# Patient Record
Sex: MALE | Race: AMERICAN INDIAN OR ALASKA NATIVE | NOT HISPANIC OR LATINO
[De-identification: names, ages, dates, MRNs, and addresses within clinical notes are randomized per-mention and may not be internally consistent; named-entity substitution may affect disease eponyms.]

---

## 2019-08-12 ENCOUNTER — APPOINTMENT (OUTPATIENT)
Dept: NEUROSURGERY | Facility: CLINIC | Age: 79
End: 2019-08-12
Payer: MEDICAID

## 2019-08-12 VITALS
HEIGHT: 66 IN | HEART RATE: 79 BPM | RESPIRATION RATE: 16 BRPM | SYSTOLIC BLOOD PRESSURE: 116 MMHG | WEIGHT: 115 LBS | BODY MASS INDEX: 18.48 KG/M2 | OXYGEN SATURATION: 98 % | DIASTOLIC BLOOD PRESSURE: 78 MMHG

## 2019-08-12 DIAGNOSIS — Z86.39 PERSONAL HISTORY OF OTHER ENDOCRINE, NUTRITIONAL AND METABOLIC DISEASE: ICD-10-CM

## 2019-08-12 DIAGNOSIS — Z87.891 PERSONAL HISTORY OF NICOTINE DEPENDENCE: ICD-10-CM

## 2019-08-12 DIAGNOSIS — Z85.118 PERSONAL HISTORY OF OTHER MALIGNANT NEOPLASM OF BRONCHUS AND LUNG: ICD-10-CM

## 2019-08-12 DIAGNOSIS — Z87.09 PERSONAL HISTORY OF OTHER DISEASES OF THE RESPIRATORY SYSTEM: ICD-10-CM

## 2019-08-12 DIAGNOSIS — Z87.438 PERSONAL HISTORY OF OTHER DISEASES OF MALE GENITAL ORGANS: ICD-10-CM

## 2019-08-12 DIAGNOSIS — Z56.0 UNEMPLOYMENT, UNSPECIFIED: ICD-10-CM

## 2019-08-12 DIAGNOSIS — Z87.898 PERSONAL HISTORY OF OTHER SPECIFIED CONDITIONS: ICD-10-CM

## 2019-08-12 DIAGNOSIS — Z85.9 PERSONAL HISTORY OF MALIGNANT NEOPLASM, UNSPECIFIED: ICD-10-CM

## 2019-08-12 PROBLEM — Z00.00 ENCOUNTER FOR PREVENTIVE HEALTH EXAMINATION: Status: ACTIVE | Noted: 2019-08-12

## 2019-08-12 PROCEDURE — 99205 OFFICE O/P NEW HI 60 MIN: CPT

## 2019-08-12 RX ORDER — FLUTICASONE FUROATE AND VILANTEROL TRIFENATATE 100; 25 UG/1; UG/1
100-25 POWDER RESPIRATORY (INHALATION)
Refills: 0 | Status: ACTIVE | COMMUNITY

## 2019-08-12 RX ORDER — THEOPHYLLINE ANHYDROUS 200 MG/1
200 CAPSULE, EXTENDED RELEASE ORAL
Refills: 0 | Status: ACTIVE | COMMUNITY

## 2019-08-12 RX ORDER — FINASTERIDE 5 MG/1
5 TABLET, FILM COATED ORAL
Refills: 0 | Status: ACTIVE | COMMUNITY

## 2019-08-12 RX ORDER — FAMOTIDINE 20 MG/1
20 TABLET, FILM COATED ORAL
Refills: 0 | Status: ACTIVE | COMMUNITY

## 2019-08-12 RX ORDER — UMECLIDINIUM 62.5 UG/1
62.5 AEROSOL, POWDER ORAL
Refills: 0 | Status: ACTIVE | COMMUNITY

## 2019-08-12 RX ORDER — FOLIC ACID 1 MG/1
1 TABLET ORAL
Refills: 0 | Status: ACTIVE | COMMUNITY

## 2019-08-12 RX ORDER — TAMSULOSIN HYDROCHLORIDE 0.4 MG/1
0.4 CAPSULE ORAL
Refills: 0 | Status: ACTIVE | COMMUNITY

## 2019-08-12 SDOH — ECONOMIC STABILITY - INCOME SECURITY: UNEMPLOYMENT, UNSPECIFIED: Z56.0

## 2019-08-12 NOTE — PHYSICAL EXAM
[General Appearance - Alert] : alert [General Appearance - In No Acute Distress] : in no acute distress [Cranial Nerves Oculomotor (III)] : extraocular motion intact [Oriented To Time, Place, And Person] : oriented to person, place, and time [Cranial Nerves Optic (II)] : visual acuity intact bilaterally,  pupils equal round and reactive to light [Cranial Nerves Trigeminal (V)] : facial sensation intact symmetrically [Cranial Nerves Vestibulocochlear (VIII)] : hearing was intact bilaterally [Cranial Nerves Facial (VII)] : face symmetrical [Cranial Nerves Accessory (XI - Cranial And Spinal)] : head turning and shoulder shrug symmetric [Cranial Nerves Glossopharyngeal (IX)] : tongue and palate midline [Cranial Nerves Hypoglossal (XII)] : there was no tongue deviation with protrusion [Motor Tone] : muscle tone was normal in all four extremities [Motor Strength] : muscle strength was normal in all four extremities [Sclera] : the sclera and conjunctiva were normal [Outer Ear] : the ears and nose were normal in appearance [Neck Appearance] : the appearance of the neck was normal [] : no respiratory distress [Heart Rate And Rhythm] : heart rate was normal and rhythm regular [Respiration, Rhythm And Depth] : normal respiratory rhythm and effort [Skin Color & Pigmentation] : normal skin color and pigmentation [Abdomen Soft] : soft [Abnormal Walk] : normal gait

## 2019-08-15 NOTE — REASON FOR VISIT
[Consultation] : a consultation visit [Referred By: _________] : Patient was referred by ALEXANDRA [Spouse] : spouse [[employer] ] : provided by [employer]   [FreeTextEntry1] : [id number] [FreeTextEntry2] : Neri

## 2019-08-15 NOTE — PLAN
[FreeTextEntry1] : MRI brain with and without contrast from 8/1/19 reviewed by Dr. Olivier, demonstrates LEFT frontal lobe mass, RIGHT frontal gyrus metastasis,as well as other smaller brain metastases. \par The patient has a good performance status, KPS = 80.\par Gamma Knife Radiosurgery recommended. Risks, alternatives and benefits to Gamma Knife Radiosurgery discussed. Risks include but are not limited to cerebral edema, radionecrosis, seizures, continued tumor growth. \par \par Explained procedure to patient in detail including head frame placement, new MRI the day of procedure, radiation treatment, and post-procedure care. \par Radiation treatment will take place at [address] [place] [address]. Directions and contact information provided to patient.\par Education packet regarding Gamma Knife Radiosurgery provided.\par Multiple questions answered to patient's satisfaction.\par Education provided regarding plan of care.\par \par Patient and patient's wife understand and wish to proceed.\par \par \par \par

## 2019-08-15 NOTE — CONSULT LETTER
[Dear  ___] : Dear  [unfilled], [Consult Letter:] : I had the pleasure of evaluating your patient, [unfilled]. [Please see my note below.] : Please see my note below. [Sincerely,] : Sincerely, [Consult Closing:] : Thank you very much for allowing me to participate in the care of this patient.  If you have any questions, please do not hesitate to contact me. [DrMaine  ___] : Dr. MORRIS [FreeTextEntry2] : Dr. Estella Paz\[address]\[place] [address] [FreeTextEntry3] : Curtis Olivier MD

## 2019-08-15 NOTE — DATA REVIEWED
[de-identified] : MRI brain 8/1/19:\par \par Impression: Left frontal craniotomy and resection of left inferior frontal lobe mass with very small foci of residual enhancement deep within the resection cavity.\par \par Residual hemorrhage in the surgical bed as well as an overlying thin subdural hematoma that extends into the interhemispheric fissue with hemorrhage and pneumocephalus subacent to the craniotomy.\par \par Unchanged rightward midline shift of 3 mm compared to CT on 7/31/19.\par \par Unchanged right frontal gyrus 4 mm metastasis with mild surrounding vasogenic edema.\par \par Additional small foci of enhancement in the right inferior temporal lobe, left inferior parietal lobe and right frontal lobe subcortical white matter most likely metastases with no surrounding vasogenic edema.

## 2019-08-15 NOTE — HISTORY OF PRESENT ILLNESS
[de-identified] : 78 year old man with past medical history of BPH, hypothyroidism, metastatic adenocarcinoma of the lung, diagnosed in August 2018, (mets to lymph nodes, liver and bone) currently on chemotherapy under the care of Dr. Wasserman (maintenance pembrolizumab and Alimta; last dose on  7/12/19) referred by Dr. Estella Paz for consideration of Gamma Knife Radiosurgery for brain metastases.\par \par Mr. Lantigua presented to [facility] ED with confusion, generalized weakness x 3 days. Workup demonstrated a LEFT frontal lobe mass. He is s/p resection by Dr. Keenan Livingston on 7/30/19. Pathology indicated metastatic adenocarcinoma with papillary features, consistent with metastasis from patient's known lung adenocarcinoma. \par \par He comes today for discussion of GKRS to resection cavity and brain metastases.\par \par The patient is Cantonese speaking only and translation is provided via [employer].

## 2019-08-15 NOTE — ADDENDUM
[FreeTextEntry1] : 	2019 \par  \par  \par OFFICE CONSULTATION NOTE \par  \par  \par Re:	Goran Lantigua  \par :	42 \par MRN:  [id number] \par  \par Mr. Lantigua is a 79-year-old man with a history of metastatic lung carcinoma, diagnosed in .  He is status post chemotherapy, immunotherapy and left frontal resection for a metastatic brain lesion on 2019.  Mr. Lantigua has additional, small, punctate lesions in the right frontal lobe measuring 1.4 mm, right temporal lobe measuring 2.6 mm and left parietal lobe measuring 2 mm. \par  \par I discussed with Mr. Lantigua, and his wife, that Gamma Knife stereotactic radiosurgery is indicated as adjuvant therapy to treat his resection cavity in the left frontal lobe with the goal of reducing the incidence of tumor recurrence/progression.  The resection cavity measures approximately 2.8 cm in greatest dimension.  I discussed also the utility of Gamma Knife radiosurgery to address the additional foci of punctate, metastatic enhancing lesions in other brain regions.  Risks of radiosurgery including symptomatic radionecrosis and/or cerebral edema were discussed.  We also discussed the possibility for tumor progression despite treatment, and the possibility of additional, untreated lesions appearing within the brain. He understands that indefinite surveillance with MRI will be necessary. \par  \par Mr. Lantigua, and his wife, would like to proceed with Gamma Knife radiosurgery which I certainly think is reasonable.  We will schedule for single fraction, headframe based Gamma Knife in the coming weeks. \par  \par  \par  \par  \par  \par Curtis Olivier M.D. \[facility]  of Neurosurgery \[facility] \[facility] Department of Neurosurgery \par [facility] \par [address] \[facility], Third [place] [address] \[facility] Tel: [phone] \[facility] Email:  [email or web address] \par  \par  \par [facility] #[id number] \[facility]  \[facility]  \[facility]

## 2019-08-29 ENCOUNTER — OUTPATIENT (OUTPATIENT)
Dept: OUTPATIENT SERVICES | Facility: HOSPITAL | Age: 79
LOS: 1 days | Discharge: HOME | End: 2019-08-29
Payer: MEDICAID

## 2019-09-03 ENCOUNTER — APPOINTMENT (OUTPATIENT)
Dept: MRI IMAGING | Facility: IMAGING CENTER | Age: 79
End: 2019-09-03

## 2019-09-03 ENCOUNTER — APPOINTMENT (OUTPATIENT)
Dept: RADIATION ONCOLOGY | Facility: CLINIC | Age: 79
End: 2019-09-03

## 2019-09-03 ENCOUNTER — APPOINTMENT (OUTPATIENT)
Dept: NEUROSURGERY | Facility: AMBULATORY SURGERY CENTER | Age: 79
End: 2019-09-03

## 2019-09-16 ENCOUNTER — OUTPATIENT (OUTPATIENT)
Dept: OUTPATIENT SERVICES | Facility: HOSPITAL | Age: 79
LOS: 1 days | End: 2019-09-16
Payer: COMMERCIAL

## 2019-09-16 ENCOUNTER — APPOINTMENT (OUTPATIENT)
Dept: MRI IMAGING | Facility: HOSPITAL | Age: 79
End: 2019-09-16
Payer: MEDICAID

## 2019-09-16 ENCOUNTER — APPOINTMENT (OUTPATIENT)
Dept: NEUROSURGERY | Facility: CLINIC | Age: 79
End: 2019-09-16
Payer: MEDICAID

## 2019-09-16 VITALS
WEIGHT: 115 LBS | BODY MASS INDEX: 18.48 KG/M2 | OXYGEN SATURATION: 95 % | HEART RATE: 120 BPM | RESPIRATION RATE: 16 BRPM | DIASTOLIC BLOOD PRESSURE: 76 MMHG | HEIGHT: 66 IN | SYSTOLIC BLOOD PRESSURE: 106 MMHG

## 2019-09-16 PROCEDURE — 99215 OFFICE O/P EST HI 40 MIN: CPT

## 2019-09-16 PROCEDURE — 70553 MRI BRAIN STEM W/O & W/DYE: CPT

## 2019-09-16 PROCEDURE — 70553 MRI BRAIN STEM W/O & W/DYE: CPT | Mod: 26

## 2019-09-16 PROCEDURE — A9585: CPT

## 2019-09-16 NOTE — REASON FOR VISIT
[Follow-Up: _____] : a [unfilled] follow-up visit [[employer] ] : provided by [employer]   [FreeTextEntry1] : Returns to review new MRI brain with and without contrast done earlier today and discuss treatment recommendations. \par The patient was previously scheduled for GKRS but did not come the day of treatment due to recent diagnosis of pneumonia.\par The patient primarily speaks Cantonese and translation is provided via [employer].\par \par At today's visit, he denies weakness, headaches, dizziness, seizures, nausea/vomiting.\par He comes to the visit with his wife.\par \par  [TWNoteComboBox1] : Catia

## 2019-09-16 NOTE — HISTORY OF PRESENT ILLNESS
[de-identified] : 78 year old man with past medical history of BPH, hypothyroidism, metastatic adenocarcinoma of the lung, diagnosed in August 2018, (mets to lymph nodes, liver and bone) currently on chemotherapy under the care of Dr. Wasserman (maintenance pembrolizumab and Alimta; last dose on  7/12/19) referred by Dr. Estella Paz for consideration of Gamma Knife Radiosurgery for brain metastases.\par \par Mr. Lantigua presented to [facility] ED with confusion, generalized weakness x 3 days. Workup demonstrated a LEFT frontal lobe mass. He is s/p resection by Dr. Keenan Livingston on 7/30/19. Pathology indicated metastatic adenocarcinoma with papillary features, consistent with metastasis from patient's known lung adenocarcinoma. \par \par He comes today for discussion of GKRS to resection cavity and brain metastases.\par \par The patient is Cantonese speaking only and translation is provided via [employer].

## 2019-09-16 NOTE — DATA REVIEWED
[de-identified] :  \par EXAM: MR BRAIN WAW IC  \par  \par PROCEDURE DATE: 09/16/2019  \par  \par  \par  \par INTERPRETATION: PROCEDURE: MRI brain with and without contrast  \par  \par INDICATION: Brain metastases  \par  \par TECHNIQUE: Sagittal T1, axial T1, T2, FLAIR, diffusion, GRE and coronal  \par FLAIR images of the brain were obtained. Following the intravenous  \par administration of contrast 7.5 ml of Gadavist, axial T1 and sagittal T1-  \par MPRAGE images of the brain were obtained and reconstructed in the axial and  \par coronal plane.  \par  \par COMPARISON: Outside MRI from a [facility] dated 8/1/2019  \par  \par FINDINGS: The MRI examination of the brain demonstrates the patient to be  \par status post left frontal craniotomy. There is extradural fluid collection at  \par the craniotomy site. In addition there is thin right hemispheric subdural  \par collection. There is a resection cavity within the inferior left frontal  \par lobe. There has been interval improvement in the hemorrhagic products within  \par an lining the resection cavity. The postcontrast images demonstrates  \par enhancement along the margins of the resection cavity. No nodularity is  \par identified to suggest recurrent or residual disease. There has been interval  \par improvement in the extensive FLAIR/T2 signal abnormality surrounding the  \par resection cavity within the left frontal lobe with mild residual signal  \par abnormality. There has been interval improvement in the mass effect on the  \par left frontal horn and anterior body of the left lateral ventricle. There has  \par been reexpansion of the ventricles.  \par  \par There is a approximately 3.6 mm nodular focus of enhancement within the  \par right frontal lobe within the right precentral gyrus (series 13 image 25 and  \par series 14 image 64. There is mild surrounding FLAIR/T2 signal abnormality.  \par The lesion measured approximately 2.6 mm on the prior study and remain  \par slightly increased in size.  \par  \par There is approximately 1.6 mm nodular focus of enhancement within the  \par anterior right frontal lobe (series 13 image 22 and series 14 image 75).  \par These findings are compatible with metastases.  \par  \par The FLAIR/T2-weighted images demonstrate scattered punctate foci of  \par increased signal within the periventricular and subcortical white matter  \par which is nonspecific and may represent these sequela of small vessel  \par ischemic disease. There is normal vascular flow-voids. The lenses are absent  \par bilaterally which may be secondary to prior cataract surgery. There is mild  \par mucosal thickening within the maxillary sinus left frontal sinus. The rest  \par of the visualized paranasal sinuses are free of mucosal disease. The mastoid  \par air cells are well-aerated.  \par  \par IMPRESSION: Status post left frontal craniotomy with resection cavity within  \par the inferior left frontal lobe. No abnormal nodular enhancement to suggest  \par recurrent or residual tumoral disease within the surgical bed. Two  \par additional small metastases within the frontal lobe.  \par  \par  \par  \par  \par  \par Thank you for the opportunity to participate in the care of this patient.  \par  \par  \par  \par JEAN PAUL ORELLANA M.D., ATTENDING RADIOLOGIST  \par This document has been electronically signed. Sep 16 2019 2:46PM  \par

## 2019-09-16 NOTE — ASSESSMENT
[FreeTextEntry1] : MRI brain done today reviewed by Dr. Olivier with patient and patient's wife in detail.\par Gamma Knife Radiosurgery recommended. Recommend Gamma Knife with three fractions.\par Risks, alternatives and benefits to Gamma Knife Radiosurgery discussed. Risks include but are not limited to cerebral edema, radionecrosis, seizures, continued tumor growth. Patient understands and wishes to proceed.\par \par Explained procedure to patient in detail including head frame placement, new MRI the day of procedure, radiation treatment, and post-procedure care. \par Radiation treatment will take place at [address] [place] [address]. Directions and contact information provided to patient.\par Education packet regarding Gamma Knife Radiosurgery provided.\par Multiple questions answered to patient's satisfaction.\par Education provided regarding plan of care.\par \par \par Patient and patient's wife verbalize agreement and understanding with plan of care. \par \par \par

## 2019-09-16 NOTE — PHYSICAL EXAM
[General Appearance - In No Acute Distress] : in no acute distress [General Appearance - Alert] : alert [Motor Tone] : muscle tone was normal in all four extremities [Oriented To Time, Place, And Person] : oriented to person, place, and time [Motor Strength] : muscle strength was normal in all four extremities [Sclera] : the sclera and conjunctiva were normal [Outer Ear] : the ears and nose were normal in appearance [Neck Appearance] : the appearance of the neck was normal [Respiration, Rhythm And Depth] : normal respiratory rhythm and effort [] : no respiratory distress [Heart Rate And Rhythm] : heart rate was normal and rhythm regular [Abnormal Walk] : normal gait [Skin Color & Pigmentation] : normal skin color and pigmentation

## 2019-09-18 ENCOUNTER — FORM ENCOUNTER (OUTPATIENT)
Age: 79
End: 2019-09-18

## 2019-09-19 ENCOUNTER — APPOINTMENT (OUTPATIENT)
Dept: RADIATION ONCOLOGY | Facility: CLINIC | Age: 79
End: 2019-09-19
Payer: MEDICAID

## 2019-09-19 ENCOUNTER — APPOINTMENT (OUTPATIENT)
Dept: MRI IMAGING | Facility: IMAGING CENTER | Age: 79
End: 2019-09-19

## 2019-09-19 ENCOUNTER — OUTPATIENT (OUTPATIENT)
Dept: OUTPATIENT SERVICES | Facility: HOSPITAL | Age: 79
LOS: 1 days | End: 2019-09-19
Payer: MEDICAID

## 2019-09-19 DIAGNOSIS — C79.31 SECONDARY MALIGNANT NEOPLASM OF BRAIN: ICD-10-CM

## 2019-09-19 PROCEDURE — 99203 OFFICE O/P NEW LOW 30 MIN: CPT | Mod: 25

## 2019-09-19 PROCEDURE — 76498 UNLISTED MR PROCEDURE: CPT

## 2019-09-19 PROCEDURE — 77334 RADIATION TREATMENT AID(S): CPT | Mod: 26

## 2019-09-19 PROCEDURE — 77290 THER RAD SIMULAJ FIELD CPLX: CPT | Mod: 26

## 2019-09-20 NOTE — LETTER CLOSING
[FreeTextEntry3] : Rick Arthur MD\par Attending Physician\par Department of Radiation Medicine\par \par \par

## 2019-09-20 NOTE — REASON FOR VISIT
[Consideration for Non-Curative Therapy] : consideration for non-curative therapy for [Brain Metastasis] : brain metastasis

## 2019-09-20 NOTE — HISTORY OF PRESENT ILLNESS
[FreeTextEntry1] : 78 year old man with past medical history of BPH, hypothyroidism, metastatic adenocarcinoma of the lung, diagnosed in August 2018, (mets to lymph nodes, liver and bone) currently on chemotherapy under the care of Dr. Wasserman (maintenance pembrolizumab and Alimta; last dose on 7/12/19) referred by Dr. Estella Paz for consideration of Gamma Knife Radiosurgery for brain metastases.\par \par Mr. Lantigua presented to [facility] ED with confusion, generalized weakness x 3 days. Workup demonstrated a LEFT frontal lobe mass. He is s/p resection by Dr. Keenan Livingston on 7/30/19. Pathology indicated metastatic adenocarcinoma with papillary features, consistent with metastasis from patient's known lung adenocarcinoma..  He comes today for discussion of GKRS to resection cavity and brain metastases.\par \par Most recent brain MRI done on 9/16/19 showed Status post left frontal craniotomy with resection cavity within \par the inferior left frontal lobe. No abnormal nodular enhancement to suggest recurrent or residual tumoral disease within the surgical bed. Two additional small metastases within the frontal lobe.  The patient is Cantonese speaking only and translation is provided via [employer].

## 2019-09-20 NOTE — VITALS
[Maximal Pain Intensity: 0/10] : 0/10 [NoTreatment Scheduled] : no treatment scheduled [Least Pain Intensity: 0/10] : 0/10 [80: Normal activity with effort; some signs or symptoms of disease.] : 80: Normal activity with effort; some signs or symptoms of disease.

## 2019-09-23 PROCEDURE — 77263 THER RADIOLOGY TX PLNG CPLX: CPT

## 2019-09-24 ENCOUNTER — APPOINTMENT (OUTPATIENT)
Dept: NEUROSURGERY | Facility: AMBULATORY SURGERY CENTER | Age: 79
End: 2019-09-24
Payer: MEDICAID

## 2019-09-24 PROCEDURE — 77300 RADIATION THERAPY DOSE PLAN: CPT | Mod: 26

## 2019-09-24 PROCEDURE — 61797 SRS CRAN LES SIMPLE ADDL: CPT

## 2019-09-24 PROCEDURE — 61798 SRS CRANIAL LESION COMPLEX: CPT

## 2019-09-24 PROCEDURE — 77295 3-D RADIOTHERAPY PLAN: CPT | Mod: 26

## 2019-09-24 PROCEDURE — 77334 RADIATION TREATMENT AID(S): CPT | Mod: 26

## 2019-09-26 PROCEDURE — 77435 SBRT MANAGEMENT: CPT

## 2019-10-04 DIAGNOSIS — C79.31 SECONDARY MALIGNANT NEOPLASM OF BRAIN: ICD-10-CM

## 2019-10-11 ENCOUNTER — CHART COPY (OUTPATIENT)
Age: 79
End: 2019-10-11

## 2019-12-06 ENCOUNTER — APPOINTMENT (OUTPATIENT)
Dept: NEUROSURGERY | Facility: CLINIC | Age: 79
End: 2019-12-06
Payer: MEDICAID

## 2019-12-06 VITALS
HEIGHT: 66 IN | WEIGHT: 115 LBS | HEART RATE: 79 BPM | OXYGEN SATURATION: 97 % | BODY MASS INDEX: 18.48 KG/M2 | DIASTOLIC BLOOD PRESSURE: 75 MMHG | RESPIRATION RATE: 16 BRPM | SYSTOLIC BLOOD PRESSURE: 108 MMHG | TEMPERATURE: 98.1 F

## 2019-12-06 PROCEDURE — 99024 POSTOP FOLLOW-UP VISIT: CPT

## 2019-12-17 NOTE — ASSESSMENT
[FreeTextEntry1] : Plan:\par - All imaging has been reviewed by Dr. Olivier, in depth, and with patient and his spouse. All questions answered.\par - His most recent brain MRI from December 3rd is stable at this time.\par - Patient is to follow up in 3 months (March 2020) with a new MRI brain.\par \par Patient and his spouse both verbalize understanding and agreement of plan.\par

## 2019-12-17 NOTE — REASON FOR VISIT
[Follow-Up: _____] : a [unfilled] follow-up visit [Spouse] : spouse [[employer] ] : provided by [employer]   [TWNoteComboBox1] : Catia [FreeTextEntry1] : Returns today to review new MRI brain with and without contrast s/p GKRS on 9/24/19. He looks well, denies h/a, n/v peripheral vision changes. He does endorse some weakness after his chemo treatments, currently going Q3 weeks for infusion with Dr. Wasserman.

## 2019-12-17 NOTE — ADDENDUM
[FreeTextEntry1] : 2019 \par  \par OFFICE FOLLOWUP NOTE \par  \par  \par Re:	Goran Lantigua  \par :	42 \par MRN:  [id number] \par  \par Mr. Lantigua is a 79-year-old man with metastatic lung carcinoma who underwent fractionated Gamma Knife radiosurgery to his left frontal resection cavity, as well as to 2 subcentimeter foci of enhancement in the right frontal lobe. Initial radiosurgery fraction was on 2019. He tolerated that procedure very well. Since Gamma Knife radiosurgery he has remained neurologically at his baseline, nonfocal. \par  \par He returns today with a new brain MRI dated September 3, 2019 which shows contraction of the left frontal resection cavity, no evidence of bulky disease or any recurrence. He does have persistent cavity enhancement which is stable since his preoperative brain MRI. There is a small extraaxial collection under the craniotomy which does not create any significant mass effect. There is some surrounding vasogenic edema which is reduced as compared to his pre radiosurgery MRI. The 2 small foci of right frontal enhancement which were also treated with Gamma Knife are no longer visible on his newest MRI. \par  \par Overall, I am very pleased with the radiographic response, as well as his clinical stabilization. At this point I recommend continued MRI brain surveillance every 3 months to ensure that there is no evidence of new or recurrent disease. We will plan to see him again in 3 months with a new brain MRI. \par  \par  \par  \par  \par  \par  \par  \par Curtis Olivier M.D. \par  of Neurosurgery \[facility] at [facility] \[facility] Department of Neurosurgery \par [facility] \par [address] \par [facility], Third Floor \[place] [address] \par Tel: [phone] \par Email:  [email or web address] \par  \par  \par [employer] [facility] #[id number] \par  \par  \par

## 2019-12-17 NOTE — REVIEW OF SYSTEMS
[As Noted in HPI] : as noted in HPI [Negative] : Heme/Lymph [FreeTextEntry3] : patient notes decreased vision, but attributes this to age, denies peripheral vision changes

## 2019-12-17 NOTE — HISTORY OF PRESENT ILLNESS
[de-identified] : 78 year old man with past medical history of BPH, hypothyroidism, metastatic adenocarcinoma of the lung, diagnosed in August 2018, (mets to lymph nodes, liver and bone) currently on chemotherapy under the care of Dr. Wasserman (maintenance pembrolizumab and Alimta; last dose on  7/12/19) referred by Dr. Estella Paz for consideration of Gamma Knife Radiosurgery for brain metastases.\par \par Mr. Lantigua presented to [facility] ED with confusion, generalized weakness x 3 days. Workup demonstrated a LEFT frontal lobe mass. He is s/p resection by Dr. Keenan Livingston on 7/30/19. Pathology indicated metastatic adenocarcinoma with papillary features, consistent with metastasis from patient's known lung adenocarcinoma. \par \par He comes today for discussion of GKRS to resection cavity and brain metastases.\par \par The patient is Cantonese speaking only and translation is provided via [employer].

## 2019-12-17 NOTE — DATA REVIEWED
[de-identified] : BRAIN WITH AND W/O CONTRAST 12/3/19 IMPRESSION:\par 1. Since 8/1/19 there is an unchanged left craniotomy with a new 1 cm thick underlying extradural fluid collection.\par 2. Contraction of the resection cavity with linear peripheral enhancement and no nodular enhancement.\par 3. Decreased surrounding T2 prolongation and decreased mass effect which may represent residual vasogenic edema or nonenhancing infiltrating tumor.\par 4. No intraparenchymal enhancement.\par 5. Moderate white matter microvascular ischemic disease.

## 2020-01-01 ENCOUNTER — TRANSCRIPTION ENCOUNTER (OUTPATIENT)
Age: 80
End: 2020-01-01

## 2020-01-01 ENCOUNTER — OUTPATIENT (OUTPATIENT)
Dept: OUTPATIENT SERVICES | Facility: HOSPITAL | Age: 80
LOS: 1 days | Discharge: HOME | End: 2020-01-01
Payer: MEDICARE

## 2020-01-01 ENCOUNTER — APPOINTMENT (OUTPATIENT)
Dept: NEUROSURGERY | Facility: CLINIC | Age: 80
End: 2020-01-01
Payer: MEDICARE

## 2020-01-01 ENCOUNTER — APPOINTMENT (OUTPATIENT)
Dept: NEUROSURGERY | Facility: AMBULATORY SURGERY CENTER | Age: 80
End: 2020-01-01
Payer: MEDICARE

## 2020-01-01 ENCOUNTER — INPATIENT (INPATIENT)
Facility: HOSPITAL | Age: 80
LOS: 4 days | Discharge: HOME HEALTH | End: 2020-11-02
Attending: INTERNAL MEDICINE | Admitting: INTERNAL MEDICINE
Payer: MEDICAID

## 2020-01-01 ENCOUNTER — APPOINTMENT (OUTPATIENT)
Dept: MRI IMAGING | Facility: IMAGING CENTER | Age: 80
End: 2020-01-01

## 2020-01-01 ENCOUNTER — OUTPATIENT (OUTPATIENT)
Dept: OUTPATIENT SERVICES | Facility: HOSPITAL | Age: 80
LOS: 1 days | End: 2020-01-01
Payer: MEDICARE

## 2020-01-01 VITALS
OXYGEN SATURATION: 100 % | HEART RATE: 83 BPM | RESPIRATION RATE: 22 BRPM | SYSTOLIC BLOOD PRESSURE: 170 MMHG | DIASTOLIC BLOOD PRESSURE: 81 MMHG

## 2020-01-01 VITALS
HEART RATE: 77 BPM | SYSTOLIC BLOOD PRESSURE: 140 MMHG | TEMPERATURE: 96 F | RESPIRATION RATE: 18 BRPM | DIASTOLIC BLOOD PRESSURE: 72 MMHG

## 2020-01-01 DIAGNOSIS — C79.31 SECONDARY MALIGNANT NEOPLASM OF BRAIN: ICD-10-CM

## 2020-01-01 DIAGNOSIS — C34.90 MALIGNANT NEOPLASM OF UNSPECIFIED PART OF UNSPECIFIED BRONCHUS OR LUNG: ICD-10-CM

## 2020-01-01 DIAGNOSIS — I10 ESSENTIAL (PRIMARY) HYPERTENSION: ICD-10-CM

## 2020-01-01 DIAGNOSIS — G93.6 CEREBRAL EDEMA: ICD-10-CM

## 2020-01-01 DIAGNOSIS — G40.911 EPILEPSY, UNSPECIFIED, INTRACTABLE, WITH STATUS EPILEPTICUS: ICD-10-CM

## 2020-01-01 DIAGNOSIS — Z92.3 PERSONAL HISTORY OF IRRADIATION: ICD-10-CM

## 2020-01-01 DIAGNOSIS — Z87.891 PERSONAL HISTORY OF NICOTINE DEPENDENCE: ICD-10-CM

## 2020-01-01 DIAGNOSIS — I62.9 NONTRAUMATIC INTRACRANIAL HEMORRHAGE, UNSPECIFIED: ICD-10-CM

## 2020-01-01 DIAGNOSIS — I82.462 ACUTE EMBOLISM AND THROMBOSIS OF LEFT CALF MUSCULAR VEIN: ICD-10-CM

## 2020-01-01 DIAGNOSIS — Z92.21 PERSONAL HISTORY OF ANTINEOPLASTIC CHEMOTHERAPY: ICD-10-CM

## 2020-01-01 DIAGNOSIS — R63.6 UNDERWEIGHT: ICD-10-CM

## 2020-01-01 DIAGNOSIS — D72.829 ELEVATED WHITE BLOOD CELL COUNT, UNSPECIFIED: ICD-10-CM

## 2020-01-01 DIAGNOSIS — N40.0 BENIGN PROSTATIC HYPERPLASIA WITHOUT LOWER URINARY TRACT SYMPTOMS: ICD-10-CM

## 2020-01-01 DIAGNOSIS — E83.41 HYPERMAGNESEMIA: ICD-10-CM

## 2020-01-01 DIAGNOSIS — T38.0X5A ADVERSE EFFECT OF GLUCOCORTICOIDS AND SYNTHETIC ANALOGUES, INITIAL ENCOUNTER: ICD-10-CM

## 2020-01-01 LAB
ALBUMIN SERPL ELPH-MCNC: 3 G/DL — LOW (ref 3.5–5.2)
ALBUMIN SERPL ELPH-MCNC: 3.1 G/DL — LOW (ref 3.5–5.2)
ALBUMIN SERPL ELPH-MCNC: 3.2 G/DL — LOW (ref 3.5–5.2)
ALBUMIN SERPL ELPH-MCNC: 3.3 G/DL — LOW (ref 3.5–5.2)
ALP SERPL-CCNC: 71 U/L — SIGNIFICANT CHANGE UP (ref 30–115)
ALP SERPL-CCNC: 72 U/L — SIGNIFICANT CHANGE UP (ref 30–115)
ALP SERPL-CCNC: 75 U/L — SIGNIFICANT CHANGE UP (ref 30–115)
ALP SERPL-CCNC: 78 U/L — SIGNIFICANT CHANGE UP (ref 30–115)
ALT FLD-CCNC: 18 U/L — SIGNIFICANT CHANGE UP (ref 0–41)
ALT FLD-CCNC: 6 U/L — SIGNIFICANT CHANGE UP (ref 0–41)
ANION GAP SERPL CALC-SCNC: 11 MMOL/L — SIGNIFICANT CHANGE UP (ref 7–14)
ANION GAP SERPL CALC-SCNC: 11 MMOL/L — SIGNIFICANT CHANGE UP (ref 7–14)
ANION GAP SERPL CALC-SCNC: 13 MMOL/L — SIGNIFICANT CHANGE UP (ref 7–14)
ANION GAP SERPL CALC-SCNC: 14 MMOL/L — SIGNIFICANT CHANGE UP (ref 7–14)
APAP SERPL-MCNC: <5 UG/ML — LOW (ref 10–30)
APPEARANCE UR: CLEAR — SIGNIFICANT CHANGE UP
APTT BLD: 22 SEC — CRITICAL LOW (ref 27–39.2)
AST SERPL-CCNC: 11 U/L — SIGNIFICANT CHANGE UP (ref 0–41)
AST SERPL-CCNC: 14 U/L — SIGNIFICANT CHANGE UP (ref 0–41)
AST SERPL-CCNC: 17 U/L — SIGNIFICANT CHANGE UP (ref 0–41)
AST SERPL-CCNC: 26 U/L — SIGNIFICANT CHANGE UP (ref 0–41)
BASE EXCESS BLDA CALC-SCNC: 3.6 MMOL/L — HIGH (ref -2–2)
BASE EXCESS BLDV CALC-SCNC: 1.2 MMOL/L — SIGNIFICANT CHANGE UP (ref -2–2)
BASOPHILS # BLD AUTO: 0.02 K/UL — SIGNIFICANT CHANGE UP (ref 0–0.2)
BASOPHILS # BLD AUTO: 0.09 K/UL — SIGNIFICANT CHANGE UP (ref 0–0.2)
BASOPHILS # BLD AUTO: 0.14 K/UL — SIGNIFICANT CHANGE UP (ref 0–0.2)
BASOPHILS # BLD AUTO: 0.21 K/UL — HIGH (ref 0–0.2)
BASOPHILS NFR BLD AUTO: 0.2 % — SIGNIFICANT CHANGE UP (ref 0–1)
BASOPHILS NFR BLD AUTO: 0.4 % — SIGNIFICANT CHANGE UP (ref 0–1)
BASOPHILS NFR BLD AUTO: 0.7 % — SIGNIFICANT CHANGE UP (ref 0–1)
BASOPHILS NFR BLD AUTO: 1.8 % — HIGH (ref 0–1)
BILIRUB SERPL-MCNC: 0.3 MG/DL — SIGNIFICANT CHANGE UP (ref 0.2–1.2)
BILIRUB SERPL-MCNC: 0.3 MG/DL — SIGNIFICANT CHANGE UP (ref 0.2–1.2)
BILIRUB SERPL-MCNC: 0.4 MG/DL — SIGNIFICANT CHANGE UP (ref 0.2–1.2)
BILIRUB SERPL-MCNC: 0.4 MG/DL — SIGNIFICANT CHANGE UP (ref 0.2–1.2)
BILIRUB UR-MCNC: NEGATIVE — SIGNIFICANT CHANGE UP
BUN SERPL-MCNC: 11 MG/DL — SIGNIFICANT CHANGE UP (ref 10–20)
BUN SERPL-MCNC: 13 MG/DL — SIGNIFICANT CHANGE UP (ref 10–20)
BUN SERPL-MCNC: 21 MG/DL — HIGH (ref 10–20)
BUN SERPL-MCNC: 26 MG/DL — HIGH (ref 10–20)
CA-I SERPL-SCNC: 1.15 MMOL/L — SIGNIFICANT CHANGE UP (ref 1.12–1.3)
CALCIUM SERPL-MCNC: 7.7 MG/DL — LOW (ref 8.5–10.1)
CALCIUM SERPL-MCNC: 7.8 MG/DL — LOW (ref 8.5–10.1)
CALCIUM SERPL-MCNC: 7.9 MG/DL — LOW (ref 8.5–10.1)
CALCIUM SERPL-MCNC: 8.3 MG/DL — LOW (ref 8.5–10.1)
CHLORIDE SERPL-SCNC: 102 MMOL/L — SIGNIFICANT CHANGE UP (ref 98–110)
CHLORIDE SERPL-SCNC: 103 MMOL/L — SIGNIFICANT CHANGE UP (ref 98–110)
CHLORIDE SERPL-SCNC: 103 MMOL/L — SIGNIFICANT CHANGE UP (ref 98–110)
CHLORIDE SERPL-SCNC: 104 MMOL/L — SIGNIFICANT CHANGE UP (ref 98–110)
CO2 SERPL-SCNC: 19 MMOL/L — SIGNIFICANT CHANGE UP (ref 17–32)
CO2 SERPL-SCNC: 25 MMOL/L — SIGNIFICANT CHANGE UP (ref 17–32)
CO2 SERPL-SCNC: 26 MMOL/L — SIGNIFICANT CHANGE UP (ref 17–32)
CO2 SERPL-SCNC: 26 MMOL/L — SIGNIFICANT CHANGE UP (ref 17–32)
COLOR SPEC: COLORLESS — SIGNIFICANT CHANGE UP
CREAT SERPL-MCNC: 0.8 MG/DL — SIGNIFICANT CHANGE UP (ref 0.7–1.5)
CREAT SERPL-MCNC: 0.9 MG/DL — SIGNIFICANT CHANGE UP (ref 0.7–1.5)
CULTURE RESULTS: NO GROWTH — SIGNIFICANT CHANGE UP
CULTURE RESULTS: SIGNIFICANT CHANGE UP
CULTURE RESULTS: SIGNIFICANT CHANGE UP
DIFF PNL FLD: NEGATIVE — SIGNIFICANT CHANGE UP
EOSINOPHIL # BLD AUTO: 0 K/UL — SIGNIFICANT CHANGE UP (ref 0–0.7)
EOSINOPHIL # BLD AUTO: 0.39 K/UL — SIGNIFICANT CHANGE UP (ref 0–0.7)
EOSINOPHIL NFR BLD AUTO: 0 % — SIGNIFICANT CHANGE UP (ref 0–8)
EOSINOPHIL NFR BLD AUTO: 3.4 % — SIGNIFICANT CHANGE UP (ref 0–8)
ETHANOL SERPL-MCNC: <10 MG/DL — SIGNIFICANT CHANGE UP
GAS PNL BLDA: SIGNIFICANT CHANGE UP
GAS PNL BLDV: 140 MMOL/L — SIGNIFICANT CHANGE UP (ref 136–145)
GAS PNL BLDV: SIGNIFICANT CHANGE UP
GLUCOSE BLDC GLUCOMTR-MCNC: 125 MG/DL — HIGH (ref 70–99)
GLUCOSE BLDC GLUCOMTR-MCNC: 127 MG/DL — HIGH (ref 70–99)
GLUCOSE BLDC GLUCOMTR-MCNC: 128 MG/DL — HIGH (ref 70–99)
GLUCOSE BLDC GLUCOMTR-MCNC: 147 MG/DL — HIGH (ref 70–99)
GLUCOSE BLDC GLUCOMTR-MCNC: 155 MG/DL — HIGH (ref 70–99)
GLUCOSE BLDC GLUCOMTR-MCNC: 156 MG/DL — HIGH (ref 70–99)
GLUCOSE BLDC GLUCOMTR-MCNC: 159 MG/DL — HIGH (ref 70–99)
GLUCOSE BLDC GLUCOMTR-MCNC: 162 MG/DL — HIGH (ref 70–99)
GLUCOSE BLDC GLUCOMTR-MCNC: 163 MG/DL — HIGH (ref 70–99)
GLUCOSE BLDC GLUCOMTR-MCNC: 168 MG/DL — HIGH (ref 70–99)
GLUCOSE BLDC GLUCOMTR-MCNC: 191 MG/DL — HIGH (ref 70–99)
GLUCOSE BLDC GLUCOMTR-MCNC: 209 MG/DL — HIGH (ref 70–99)
GLUCOSE SERPL-MCNC: 115 MG/DL — HIGH (ref 70–99)
GLUCOSE SERPL-MCNC: 141 MG/DL — HIGH (ref 70–99)
GLUCOSE SERPL-MCNC: 154 MG/DL — HIGH (ref 70–99)
GLUCOSE SERPL-MCNC: 177 MG/DL — HIGH (ref 70–99)
GLUCOSE UR QL: NEGATIVE — SIGNIFICANT CHANGE UP
HCO3 BLDA-SCNC: 28 MMOL/L — HIGH (ref 23–27)
HCO3 BLDV-SCNC: 30 MMOL/L — HIGH (ref 22–29)
HCT VFR BLD CALC: 32.3 % — LOW (ref 42–52)
HCT VFR BLD CALC: 32.8 % — LOW (ref 42–52)
HCT VFR BLD CALC: 33.9 % — LOW (ref 42–52)
HCT VFR BLD CALC: 38.7 % — LOW (ref 42–52)
HCT VFR BLDA CALC: 39.5 % — SIGNIFICANT CHANGE UP (ref 34–44)
HGB BLD CALC-MCNC: 12.9 G/DL — LOW (ref 14–18)
HGB BLD-MCNC: 10 G/DL — LOW (ref 14–18)
HGB BLD-MCNC: 10.2 G/DL — LOW (ref 14–18)
HGB BLD-MCNC: 10.4 G/DL — LOW (ref 14–18)
HGB BLD-MCNC: 11.7 G/DL — LOW (ref 14–18)
IMM GRANULOCYTES NFR BLD AUTO: 0.6 % — HIGH (ref 0.1–0.3)
IMM GRANULOCYTES NFR BLD AUTO: 1.6 % — HIGH (ref 0.1–0.3)
IMM GRANULOCYTES NFR BLD AUTO: 1.9 % — HIGH (ref 0.1–0.3)
IMM GRANULOCYTES NFR BLD AUTO: 2.9 % — HIGH (ref 0.1–0.3)
INR BLD: 1 RATIO — SIGNIFICANT CHANGE UP (ref 0.65–1.3)
KETONES UR-MCNC: NEGATIVE — SIGNIFICANT CHANGE UP
LACTATE BLDV-MCNC: 1.9 MMOL/L — HIGH (ref 0.5–1.6)
LEUKOCYTE ESTERASE UR-ACNC: NEGATIVE — SIGNIFICANT CHANGE UP
LEVETIRACETAM SERPL-MCNC: 28.4 UG/ML — SIGNIFICANT CHANGE UP (ref 10–40)
LYMPHOCYTES # BLD AUTO: 0.97 K/UL — LOW (ref 1.2–3.4)
LYMPHOCYTES # BLD AUTO: 0.98 K/UL — LOW (ref 1.2–3.4)
LYMPHOCYTES # BLD AUTO: 1.2 K/UL — SIGNIFICANT CHANGE UP (ref 1.2–3.4)
LYMPHOCYTES # BLD AUTO: 2.47 K/UL — SIGNIFICANT CHANGE UP (ref 1.2–3.4)
LYMPHOCYTES # BLD AUTO: 21.4 % — SIGNIFICANT CHANGE UP (ref 20.5–51.1)
LYMPHOCYTES # BLD AUTO: 4.5 % — LOW (ref 20.5–51.1)
LYMPHOCYTES # BLD AUTO: 5.8 % — LOW (ref 20.5–51.1)
LYMPHOCYTES # BLD AUTO: 7.7 % — LOW (ref 20.5–51.1)
MAGNESIUM SERPL-MCNC: 2.3 MG/DL — SIGNIFICANT CHANGE UP (ref 1.8–2.4)
MAGNESIUM SERPL-MCNC: 2.5 MG/DL — HIGH (ref 1.8–2.4)
MAGNESIUM SERPL-MCNC: 2.5 MG/DL — HIGH (ref 1.8–2.4)
MCHC RBC-ENTMCNC: 28.5 PG — SIGNIFICANT CHANGE UP (ref 27–31)
MCHC RBC-ENTMCNC: 28.6 PG — SIGNIFICANT CHANGE UP (ref 27–31)
MCHC RBC-ENTMCNC: 28.7 PG — SIGNIFICANT CHANGE UP (ref 27–31)
MCHC RBC-ENTMCNC: 29 PG — SIGNIFICANT CHANGE UP (ref 27–31)
MCHC RBC-ENTMCNC: 30.2 G/DL — LOW (ref 32–37)
MCHC RBC-ENTMCNC: 30.5 G/DL — LOW (ref 32–37)
MCHC RBC-ENTMCNC: 30.7 G/DL — LOW (ref 32–37)
MCHC RBC-ENTMCNC: 31.6 G/DL — LOW (ref 32–37)
MCV RBC AUTO: 91.8 FL — SIGNIFICANT CHANGE UP (ref 80–94)
MCV RBC AUTO: 93.4 FL — SIGNIFICANT CHANGE UP (ref 80–94)
MCV RBC AUTO: 93.4 FL — SIGNIFICANT CHANGE UP (ref 80–94)
MCV RBC AUTO: 94.6 FL — HIGH (ref 80–94)
MONOCYTES # BLD AUTO: 0.44 K/UL — SIGNIFICANT CHANGE UP (ref 0.1–0.6)
MONOCYTES # BLD AUTO: 0.47 K/UL — SIGNIFICANT CHANGE UP (ref 0.1–0.6)
MONOCYTES # BLD AUTO: 0.49 K/UL — SIGNIFICANT CHANGE UP (ref 0.1–0.6)
MONOCYTES # BLD AUTO: 0.56 K/UL — SIGNIFICANT CHANGE UP (ref 0.1–0.6)
MONOCYTES NFR BLD AUTO: 2.1 % — SIGNIFICANT CHANGE UP (ref 1.7–9.3)
MONOCYTES NFR BLD AUTO: 2.1 % — SIGNIFICANT CHANGE UP (ref 1.7–9.3)
MONOCYTES NFR BLD AUTO: 4.3 % — SIGNIFICANT CHANGE UP (ref 1.7–9.3)
MONOCYTES NFR BLD AUTO: 4.5 % — SIGNIFICANT CHANGE UP (ref 1.7–9.3)
MRSA PCR RESULT.: NEGATIVE — SIGNIFICANT CHANGE UP
NEUTROPHILS # BLD AUTO: 10.95 K/UL — HIGH (ref 1.4–6.5)
NEUTROPHILS # BLD AUTO: 18.35 K/UL — HIGH (ref 1.4–6.5)
NEUTROPHILS # BLD AUTO: 19.93 K/UL — HIGH (ref 1.4–6.5)
NEUTROPHILS # BLD AUTO: 7.77 K/UL — HIGH (ref 1.4–6.5)
NEUTROPHILS NFR BLD AUTO: 67.5 % — SIGNIFICANT CHANGE UP (ref 42.2–75.2)
NEUTROPHILS NFR BLD AUTO: 87 % — HIGH (ref 42.2–75.2)
NEUTROPHILS NFR BLD AUTO: 88.5 % — HIGH (ref 42.2–75.2)
NEUTROPHILS NFR BLD AUTO: 91.1 % — HIGH (ref 42.2–75.2)
NITRITE UR-MCNC: NEGATIVE — SIGNIFICANT CHANGE UP
NRBC # BLD: 0 /100 WBCS — SIGNIFICANT CHANGE UP (ref 0–0)
NT-PROBNP SERPL-SCNC: 88 PG/ML — SIGNIFICANT CHANGE UP (ref 0–300)
PCO2 BLDA: 42 MMHG — SIGNIFICANT CHANGE UP (ref 38–42)
PCO2 BLDV: 64 MMHG — HIGH (ref 41–51)
PH BLDA: 7.44 — HIGH (ref 7.38–7.42)
PH BLDV: 7.27 — SIGNIFICANT CHANGE UP (ref 7.26–7.43)
PH UR: 8 — SIGNIFICANT CHANGE UP (ref 5–8)
PLATELET # BLD AUTO: 251 K/UL — SIGNIFICANT CHANGE UP (ref 130–400)
PLATELET # BLD AUTO: 307 K/UL — SIGNIFICANT CHANGE UP (ref 130–400)
PLATELET # BLD AUTO: 323 K/UL — SIGNIFICANT CHANGE UP (ref 130–400)
PLATELET # BLD AUTO: 362 K/UL — SIGNIFICANT CHANGE UP (ref 130–400)
PO2 BLDA: 147 MMHG — HIGH (ref 78–95)
PO2 BLDV: 54 MMHG — HIGH (ref 20–40)
POTASSIUM BLDV-SCNC: 3.3 MMOL/L — SIGNIFICANT CHANGE UP (ref 3.3–5.6)
POTASSIUM SERPL-MCNC: 3.8 MMOL/L — SIGNIFICANT CHANGE UP (ref 3.5–5)
POTASSIUM SERPL-MCNC: 3.9 MMOL/L — SIGNIFICANT CHANGE UP (ref 3.5–5)
POTASSIUM SERPL-MCNC: 4.1 MMOL/L — SIGNIFICANT CHANGE UP (ref 3.5–5)
POTASSIUM SERPL-MCNC: 4.6 MMOL/L — SIGNIFICANT CHANGE UP (ref 3.5–5)
POTASSIUM SERPL-SCNC: 3.8 MMOL/L — SIGNIFICANT CHANGE UP (ref 3.5–5)
POTASSIUM SERPL-SCNC: 3.9 MMOL/L — SIGNIFICANT CHANGE UP (ref 3.5–5)
POTASSIUM SERPL-SCNC: 4.1 MMOL/L — SIGNIFICANT CHANGE UP (ref 3.5–5)
POTASSIUM SERPL-SCNC: 4.6 MMOL/L — SIGNIFICANT CHANGE UP (ref 3.5–5)
PROT SERPL-MCNC: 5.3 G/DL — LOW (ref 6–8)
PROT SERPL-MCNC: 5.6 G/DL — LOW (ref 6–8)
PROT SERPL-MCNC: 5.6 G/DL — LOW (ref 6–8)
PROT SERPL-MCNC: 5.7 G/DL — LOW (ref 6–8)
PROT UR-MCNC: NEGATIVE — SIGNIFICANT CHANGE UP
PROTHROM AB SERPL-ACNC: 11.5 SEC — SIGNIFICANT CHANGE UP (ref 9.95–12.87)
RAPID RVP RESULT: SIGNIFICANT CHANGE UP
RBC # BLD: 3.51 M/UL — LOW (ref 4.7–6.1)
RBC # BLD: 3.52 M/UL — LOW (ref 4.7–6.1)
RBC # BLD: 3.63 M/UL — LOW (ref 4.7–6.1)
RBC # BLD: 4.09 M/UL — LOW (ref 4.7–6.1)
RBC # FLD: 16.5 % — HIGH (ref 11.5–14.5)
RBC # FLD: 16.7 % — HIGH (ref 11.5–14.5)
RBC # FLD: 16.9 % — HIGH (ref 11.5–14.5)
RBC # FLD: 17.2 % — HIGH (ref 11.5–14.5)
SALICYLATES SERPL-MCNC: <0.3 MG/DL — LOW (ref 4–30)
SAO2 % BLDA: 99 % — HIGH (ref 94–98)
SAO2 % BLDV: 82 % — SIGNIFICANT CHANGE UP
SARS-COV-2 RNA SPEC QL NAA+PROBE: SIGNIFICANT CHANGE UP
SARS-COV-2 RNA SPEC QL NAA+PROBE: SIGNIFICANT CHANGE UP
SODIUM SERPL-SCNC: 136 MMOL/L — SIGNIFICANT CHANGE UP (ref 135–146)
SODIUM SERPL-SCNC: 140 MMOL/L — SIGNIFICANT CHANGE UP (ref 135–146)
SODIUM SERPL-SCNC: 140 MMOL/L — SIGNIFICANT CHANGE UP (ref 135–146)
SODIUM SERPL-SCNC: 141 MMOL/L — SIGNIFICANT CHANGE UP (ref 135–146)
SP GR SPEC: 1.01 — LOW (ref 1.01–1.03)
SPECIMEN SOURCE: SIGNIFICANT CHANGE UP
TROPONIN T SERPL-MCNC: <0.01 NG/ML — SIGNIFICANT CHANGE UP
UROBILINOGEN FLD QL: SIGNIFICANT CHANGE UP
WBC # BLD: 11.52 K/UL — HIGH (ref 4.8–10.8)
WBC # BLD: 12.58 K/UL — HIGH (ref 4.8–10.8)
WBC # BLD: 20.73 K/UL — HIGH (ref 4.8–10.8)
WBC # BLD: 21.88 K/UL — HIGH (ref 4.8–10.8)
WBC # FLD AUTO: 11.52 K/UL — HIGH (ref 4.8–10.8)
WBC # FLD AUTO: 12.58 K/UL — HIGH (ref 4.8–10.8)
WBC # FLD AUTO: 20.73 K/UL — HIGH (ref 4.8–10.8)
WBC # FLD AUTO: 21.88 K/UL — HIGH (ref 4.8–10.8)

## 2020-01-01 PROCEDURE — 77334 RADIATION TREATMENT AID(S): CPT | Mod: 26

## 2020-01-01 PROCEDURE — 99442: CPT | Mod: 95

## 2020-01-01 PROCEDURE — 77432 STEREOTACTIC RADIATION TRMT: CPT

## 2020-01-01 PROCEDURE — 61800 APPLY SRS HEADFRAME ADD-ON: CPT

## 2020-01-01 PROCEDURE — 95720 EEG PHY/QHP EA INCR W/VEEG: CPT

## 2020-01-01 PROCEDURE — 93970 EXTREMITY STUDY: CPT | Mod: 26

## 2020-01-01 PROCEDURE — 77295 3-D RADIOTHERAPY PLAN: CPT | Mod: 26

## 2020-01-01 PROCEDURE — 77263 THER RADIOLOGY TX PLNG CPLX: CPT

## 2020-01-01 PROCEDURE — 71045 X-RAY EXAM CHEST 1 VIEW: CPT | Mod: 26

## 2020-01-01 PROCEDURE — 99239 HOSP IP/OBS DSCHRG MGMT >30: CPT

## 2020-01-01 PROCEDURE — 77300 RADIATION THERAPY DOSE PLAN: CPT | Mod: 26

## 2020-01-01 PROCEDURE — 99232 SBSQ HOSP IP/OBS MODERATE 35: CPT

## 2020-01-01 PROCEDURE — 99231 SBSQ HOSP IP/OBS SF/LOW 25: CPT

## 2020-01-01 PROCEDURE — A9585: CPT

## 2020-01-01 PROCEDURE — 61796 SRS CRANIAL LESION SIMPLE: CPT

## 2020-01-01 PROCEDURE — 70450 CT HEAD/BRAIN W/O DYE: CPT | Mod: 26

## 2020-01-01 PROCEDURE — 99213 OFFICE O/P EST LOW 20 MIN: CPT

## 2020-01-01 PROCEDURE — 99291 CRITICAL CARE FIRST HOUR: CPT

## 2020-01-01 PROCEDURE — 70553 MRI BRAIN STEM W/O & W/DYE: CPT | Mod: 26

## 2020-01-01 PROCEDURE — 99223 1ST HOSP IP/OBS HIGH 75: CPT

## 2020-01-01 PROCEDURE — 36556 INSERT NON-TUNNEL CV CATH: CPT

## 2020-01-01 PROCEDURE — 93306 TTE W/DOPPLER COMPLETE: CPT | Mod: 26

## 2020-01-01 PROCEDURE — 76498 UNLISTED MR PROCEDURE: CPT

## 2020-01-01 PROCEDURE — 31500 INSERT EMERGENCY AIRWAY: CPT

## 2020-01-01 PROCEDURE — 99233 SBSQ HOSP IP/OBS HIGH 50: CPT

## 2020-01-01 PROCEDURE — 99291 CRITICAL CARE FIRST HOUR: CPT | Mod: CS,25

## 2020-01-01 PROCEDURE — 93010 ELECTROCARDIOGRAM REPORT: CPT

## 2020-01-01 RX ORDER — CEFEPIME 1 G/1
2000 INJECTION, POWDER, FOR SOLUTION INTRAMUSCULAR; INTRAVENOUS ONCE
Refills: 0 | Status: COMPLETED | OUTPATIENT
Start: 2020-01-01 | End: 2020-01-01

## 2020-01-01 RX ORDER — ENOXAPARIN SODIUM 100 MG/ML
40 INJECTION SUBCUTANEOUS DAILY
Refills: 0 | Status: DISCONTINUED | OUTPATIENT
Start: 2020-01-01 | End: 2020-01-01

## 2020-01-01 RX ORDER — LEVETIRACETAM 500 MG/1
500 TABLET, FILM COATED ORAL
Refills: 0 | Status: DISCONTINUED | COMMUNITY
End: 2020-01-01

## 2020-01-01 RX ORDER — AZITHROMYCIN 500 MG/1
500 TABLET, FILM COATED ORAL ONCE
Refills: 0 | Status: COMPLETED | OUTPATIENT
Start: 2020-01-01 | End: 2020-01-01

## 2020-01-01 RX ORDER — MIDAZOLAM HYDROCHLORIDE 1 MG/ML
0.01 INJECTION, SOLUTION INTRAMUSCULAR; INTRAVENOUS
Qty: 100 | Refills: 0 | Status: DISCONTINUED | OUTPATIENT
Start: 2020-01-01 | End: 2020-01-01

## 2020-01-01 RX ORDER — SENNOSIDES 8.6 MG
8.6 TABLET ORAL
Refills: 0 | Status: DISCONTINUED | COMMUNITY
End: 2020-01-01

## 2020-01-01 RX ORDER — LEVETIRACETAM 250 MG/1
1500 TABLET, FILM COATED ORAL EVERY 12 HOURS
Refills: 0 | Status: DISCONTINUED | OUTPATIENT
Start: 2020-01-01 | End: 2020-01-01

## 2020-01-01 RX ORDER — CHLORHEXIDINE GLUCONATE 213 G/1000ML
15 SOLUTION TOPICAL EVERY 12 HOURS
Refills: 0 | Status: DISCONTINUED | OUTPATIENT
Start: 2020-01-01 | End: 2020-01-01

## 2020-01-01 RX ORDER — DEXAMETHASONE 0.5 MG/5ML
10 ELIXIR ORAL ONCE
Refills: 0 | Status: COMPLETED | OUTPATIENT
Start: 2020-01-01 | End: 2020-01-01

## 2020-01-01 RX ORDER — ROCURONIUM BROMIDE 10 MG/ML
80 VIAL (ML) INTRAVENOUS ONCE
Refills: 0 | Status: COMPLETED | OUTPATIENT
Start: 2020-01-01 | End: 2020-01-01

## 2020-01-01 RX ORDER — SODIUM CHLORIDE 9 MG/ML
1000 INJECTION, SOLUTION INTRAVENOUS ONCE
Refills: 0 | Status: DISCONTINUED | OUTPATIENT
Start: 2020-01-01 | End: 2020-01-01

## 2020-01-01 RX ORDER — LEVETIRACETAM 250 MG/1
500 TABLET, FILM COATED ORAL ONCE
Refills: 0 | Status: COMPLETED | OUTPATIENT
Start: 2020-01-01 | End: 2020-01-01

## 2020-01-01 RX ORDER — FENTANYL CITRATE 50 UG/ML
0.5 INJECTION INTRAVENOUS
Qty: 2500 | Refills: 0 | Status: DISCONTINUED | OUTPATIENT
Start: 2020-01-01 | End: 2020-01-01

## 2020-01-01 RX ORDER — DEXAMETHASONE 0.5 MG/5ML
1 ELIXIR ORAL
Qty: 90 | Refills: 0
Start: 2020-01-01 | End: 2020-01-01

## 2020-01-01 RX ORDER — VANCOMYCIN HCL 1 G
1000 VIAL (EA) INTRAVENOUS ONCE
Refills: 0 | Status: COMPLETED | OUTPATIENT
Start: 2020-01-01 | End: 2020-01-01

## 2020-01-01 RX ORDER — CHLORHEXIDINE GLUCONATE 213 G/1000ML
1 SOLUTION TOPICAL
Refills: 0 | Status: DISCONTINUED | OUTPATIENT
Start: 2020-01-01 | End: 2020-01-01

## 2020-01-01 RX ORDER — MAGNESIUM SULFATE 500 MG/ML
2 VIAL (ML) INJECTION ONCE
Refills: 0 | Status: COMPLETED | OUTPATIENT
Start: 2020-01-01 | End: 2020-01-01

## 2020-01-01 RX ORDER — LEVETIRACETAM 250 MG/1
1000 TABLET, FILM COATED ORAL ONCE
Refills: 0 | Status: COMPLETED | OUTPATIENT
Start: 2020-01-01 | End: 2020-01-01

## 2020-01-01 RX ORDER — DEXAMETHASONE 0.5 MG/5ML
4 ELIXIR ORAL EVERY 6 HOURS
Refills: 0 | Status: DISCONTINUED | OUTPATIENT
Start: 2020-01-01 | End: 2020-01-01

## 2020-01-01 RX ORDER — SODIUM CHLORIDE 9 MG/ML
1000 INJECTION, SOLUTION INTRAVENOUS ONCE
Refills: 0 | Status: COMPLETED | OUTPATIENT
Start: 2020-01-01 | End: 2020-01-01

## 2020-01-01 RX ORDER — MIDAZOLAM HYDROCHLORIDE 1 MG/ML
0.02 INJECTION, SOLUTION INTRAMUSCULAR; INTRAVENOUS
Qty: 100 | Refills: 0 | Status: DISCONTINUED | OUTPATIENT
Start: 2020-01-01 | End: 2020-01-01

## 2020-01-01 RX ORDER — LEVOTHYROXINE SODIUM 25 UG/1
25 TABLET ORAL
Refills: 0 | Status: DISCONTINUED | COMMUNITY
End: 2020-01-01

## 2020-01-01 RX ORDER — NOREPINEPHRINE BITARTRATE/D5W 8 MG/250ML
0.05 PLASTIC BAG, INJECTION (ML) INTRAVENOUS
Qty: 8 | Refills: 0 | Status: DISCONTINUED | OUTPATIENT
Start: 2020-01-01 | End: 2020-01-01

## 2020-01-01 RX ORDER — PANTOPRAZOLE SODIUM 20 MG/1
1 TABLET, DELAYED RELEASE ORAL
Qty: 30 | Refills: 0
Start: 2020-01-01 | End: 2020-01-01

## 2020-01-01 RX ORDER — ETOMIDATE 2 MG/ML
20 INJECTION INTRAVENOUS ONCE
Refills: 0 | Status: COMPLETED | OUTPATIENT
Start: 2020-01-01 | End: 2020-01-01

## 2020-01-01 RX ORDER — PANTOPRAZOLE SODIUM 20 MG/1
40 TABLET, DELAYED RELEASE ORAL DAILY
Refills: 0 | Status: DISCONTINUED | OUTPATIENT
Start: 2020-01-01 | End: 2020-01-01

## 2020-01-01 RX ORDER — DEXAMETHASONE 1 MG/1
1 TABLET ORAL
Refills: 0 | Status: ACTIVE | COMMUNITY

## 2020-01-01 RX ORDER — LEVETIRACETAM 250 MG/1
2 TABLET, FILM COATED ORAL
Qty: 120 | Refills: 0
Start: 2020-01-01 | End: 2020-01-01

## 2020-01-01 RX ORDER — NOREPINEPHRINE BITARTRATE/D5W 8 MG/250ML
0.02 PLASTIC BAG, INJECTION (ML) INTRAVENOUS
Qty: 8 | Refills: 0 | Status: DISCONTINUED | OUTPATIENT
Start: 2020-01-01 | End: 2020-01-01

## 2020-01-01 RX ORDER — PANTOPRAZOLE SODIUM 20 MG/1
40 TABLET, DELAYED RELEASE ORAL
Refills: 0 | Status: DISCONTINUED | OUTPATIENT
Start: 2020-01-01 | End: 2020-01-01

## 2020-01-01 RX ORDER — MIDAZOLAM HYDROCHLORIDE 1 MG/ML
0.05 INJECTION, SOLUTION INTRAMUSCULAR; INTRAVENOUS
Qty: 100 | Refills: 0 | Status: DISCONTINUED | OUTPATIENT
Start: 2020-01-01 | End: 2020-01-01

## 2020-01-01 RX ORDER — LEVETIRACETAM 250 MG/1
1500 TABLET, FILM COATED ORAL
Refills: 0 | Status: DISCONTINUED | OUTPATIENT
Start: 2020-01-01 | End: 2020-01-01

## 2020-01-01 RX ADMIN — CHLORHEXIDINE GLUCONATE 15 MILLILITER(S): 213 SOLUTION TOPICAL at 05:04

## 2020-01-01 RX ADMIN — LEVETIRACETAM 400 MILLIGRAM(S): 250 TABLET, FILM COATED ORAL at 05:27

## 2020-01-01 RX ADMIN — AZITHROMYCIN 500 MILLIGRAM(S): 500 TABLET, FILM COATED ORAL at 08:30

## 2020-01-01 RX ADMIN — Medication 50 GRAM(S): at 10:21

## 2020-01-01 RX ADMIN — Medication 4 MILLIGRAM(S): at 00:15

## 2020-01-01 RX ADMIN — Medication 4 MILLIGRAM(S): at 05:25

## 2020-01-01 RX ADMIN — LEVETIRACETAM 500 MILLIGRAM(S): 250 TABLET, FILM COATED ORAL at 10:36

## 2020-01-01 RX ADMIN — CHLORHEXIDINE GLUCONATE 15 MILLILITER(S): 213 SOLUTION TOPICAL at 05:48

## 2020-01-01 RX ADMIN — LEVETIRACETAM 400 MILLIGRAM(S): 250 TABLET, FILM COATED ORAL at 05:26

## 2020-01-01 RX ADMIN — Medication 10 MILLIGRAM(S): at 09:19

## 2020-01-01 RX ADMIN — CEFEPIME 100 MILLIGRAM(S): 1 INJECTION, POWDER, FOR SOLUTION INTRAMUSCULAR; INTRAVENOUS at 08:26

## 2020-01-01 RX ADMIN — CEFEPIME 2000 MILLIGRAM(S): 1 INJECTION, POWDER, FOR SOLUTION INTRAMUSCULAR; INTRAVENOUS at 09:00

## 2020-01-01 RX ADMIN — MIDAZOLAM HYDROCHLORIDE 1.07 MG/KG/HR: 1 INJECTION, SOLUTION INTRAMUSCULAR; INTRAVENOUS at 05:48

## 2020-01-01 RX ADMIN — CHLORHEXIDINE GLUCONATE 1 APPLICATION(S): 213 SOLUTION TOPICAL at 05:48

## 2020-01-01 RX ADMIN — SODIUM CHLORIDE 1000 MILLILITER(S): 9 INJECTION, SOLUTION INTRAVENOUS at 08:26

## 2020-01-01 RX ADMIN — PANTOPRAZOLE SODIUM 40 MILLIGRAM(S): 20 TABLET, DELAYED RELEASE ORAL at 11:53

## 2020-01-01 RX ADMIN — CHLORHEXIDINE GLUCONATE 1 APPLICATION(S): 213 SOLUTION TOPICAL at 05:25

## 2020-01-01 RX ADMIN — LEVETIRACETAM 400 MILLIGRAM(S): 250 TABLET, FILM COATED ORAL at 05:48

## 2020-01-01 RX ADMIN — Medication 4 MILLIGRAM(S): at 11:53

## 2020-01-01 RX ADMIN — LEVETIRACETAM 400 MILLIGRAM(S): 250 TABLET, FILM COATED ORAL at 07:30

## 2020-01-01 RX ADMIN — LEVETIRACETAM 400 MILLIGRAM(S): 250 TABLET, FILM COATED ORAL at 18:30

## 2020-01-01 RX ADMIN — Medication 4 MILLIGRAM(S): at 12:48

## 2020-01-01 RX ADMIN — Medication 4 MILLIGRAM(S): at 13:20

## 2020-01-01 RX ADMIN — LEVETIRACETAM 400 MILLIGRAM(S): 250 TABLET, FILM COATED ORAL at 05:51

## 2020-01-01 RX ADMIN — ETOMIDATE 20 MILLIGRAM(S): 2 INJECTION INTRAVENOUS at 09:49

## 2020-01-01 RX ADMIN — ENOXAPARIN SODIUM 40 MILLIGRAM(S): 100 INJECTION SUBCUTANEOUS at 21:40

## 2020-01-01 RX ADMIN — AZITHROMYCIN 255 MILLIGRAM(S): 500 TABLET, FILM COATED ORAL at 07:30

## 2020-01-01 RX ADMIN — LEVETIRACETAM 1000 MILLIGRAM(S): 250 TABLET, FILM COATED ORAL at 07:45

## 2020-01-01 RX ADMIN — SODIUM CHLORIDE 1000 MILLILITER(S): 9 INJECTION, SOLUTION INTRAVENOUS at 09:19

## 2020-01-01 RX ADMIN — PANTOPRAZOLE SODIUM 40 MILLIGRAM(S): 20 TABLET, DELAYED RELEASE ORAL at 12:48

## 2020-01-01 RX ADMIN — Medication 80 MILLIGRAM(S): at 09:49

## 2020-01-01 RX ADMIN — SODIUM CHLORIDE 1000 MILLILITER(S): 9 INJECTION, SOLUTION INTRAVENOUS at 09:26

## 2020-01-01 RX ADMIN — Medication 4 MILLIGRAM(S): at 00:09

## 2020-01-01 RX ADMIN — Medication 4 MILLIGRAM(S): at 18:30

## 2020-01-01 RX ADMIN — PANTOPRAZOLE SODIUM 40 MILLIGRAM(S): 20 TABLET, DELAYED RELEASE ORAL at 12:41

## 2020-01-01 RX ADMIN — PANTOPRAZOLE SODIUM 40 MILLIGRAM(S): 20 TABLET, DELAYED RELEASE ORAL at 11:59

## 2020-01-01 RX ADMIN — Medication 250 MILLIGRAM(S): at 07:30

## 2020-01-01 RX ADMIN — LEVETIRACETAM 400 MILLIGRAM(S): 250 TABLET, FILM COATED ORAL at 17:01

## 2020-01-01 RX ADMIN — CHLORHEXIDINE GLUCONATE 1 APPLICATION(S): 213 SOLUTION TOPICAL at 05:51

## 2020-01-01 RX ADMIN — Medication 4 MILLIGRAM(S): at 05:52

## 2020-01-01 RX ADMIN — LEVETIRACETAM 400 MILLIGRAM(S): 250 TABLET, FILM COATED ORAL at 05:03

## 2020-01-01 RX ADMIN — Medication 4 MILLIGRAM(S): at 17:01

## 2020-01-01 RX ADMIN — ENOXAPARIN SODIUM 40 MILLIGRAM(S): 100 INJECTION SUBCUTANEOUS at 13:20

## 2020-01-01 RX ADMIN — FENTANYL CITRATE 2.5 MICROGRAM(S)/KG/HR: 50 INJECTION INTRAVENOUS at 10:07

## 2020-01-01 RX ADMIN — Medication 6 MILLIGRAM(S): at 07:31

## 2020-01-01 RX ADMIN — Medication 2 GRAM(S): at 11:21

## 2020-01-01 RX ADMIN — SODIUM CHLORIDE 1000 MILLILITER(S): 9 INJECTION, SOLUTION INTRAVENOUS at 10:19

## 2020-01-01 RX ADMIN — Medication 4 MILLIGRAM(S): at 05:03

## 2020-01-01 RX ADMIN — Medication 4 MILLIGRAM(S): at 00:45

## 2020-01-01 RX ADMIN — LEVETIRACETAM 400 MILLIGRAM(S): 250 TABLET, FILM COATED ORAL at 17:38

## 2020-01-01 RX ADMIN — Medication 0.5 MILLIGRAM(S): at 04:34

## 2020-01-01 RX ADMIN — Medication 4 MILLIGRAM(S): at 05:26

## 2020-01-01 RX ADMIN — Medication 4 MILLIGRAM(S): at 11:59

## 2020-01-01 RX ADMIN — LEVETIRACETAM 400 MILLIGRAM(S): 250 TABLET, FILM COATED ORAL at 17:49

## 2020-01-01 RX ADMIN — CHLORHEXIDINE GLUCONATE 15 MILLILITER(S): 213 SOLUTION TOPICAL at 18:22

## 2020-01-01 RX ADMIN — Medication 1000 MILLIGRAM(S): at 08:30

## 2020-01-01 RX ADMIN — LEVETIRACETAM 420 MILLIGRAM(S): 250 TABLET, FILM COATED ORAL at 10:21

## 2020-01-01 RX ADMIN — Medication 4 MILLIGRAM(S): at 05:48

## 2020-01-01 RX ADMIN — Medication 4 MILLIGRAM(S): at 00:50

## 2020-01-01 RX ADMIN — Medication 4 MILLIGRAM(S): at 17:38

## 2020-01-01 RX ADMIN — CHLORHEXIDINE GLUCONATE 1 APPLICATION(S): 213 SOLUTION TOPICAL at 05:03

## 2020-01-01 RX ADMIN — Medication 4 MILLIGRAM(S): at 17:49

## 2020-01-01 RX ADMIN — Medication 4 MILLIGRAM(S): at 00:58

## 2020-01-01 RX ADMIN — Medication 4 MILLIGRAM(S): at 12:41

## 2020-01-01 RX ADMIN — LEVETIRACETAM 400 MILLIGRAM(S): 250 TABLET, FILM COATED ORAL at 18:07

## 2020-01-01 RX ADMIN — MIDAZOLAM HYDROCHLORIDE 1 MG/KG/HR: 1 INJECTION, SOLUTION INTRAMUSCULAR; INTRAVENOUS at 10:07

## 2020-06-03 NOTE — ASSESSMENT
[FreeTextEntry1] : Gamma Knife Radiosurgery recommended to new RIGHT frontal brain metastasis. Risks, alternatives and benefits to Gamma Knife Radiosurgery discussed. Risks include but are not limited to cerebral edema, radionecrosis, seizures, continued tumor growth. Patient understands and wishes to proceed.\par \par Explained procedure to patient in detail including head frame placement, new MRI the day of procedure, radiation treatment, and post-procedure care. \par Radiation treatment will take place at [address] [place] [address]. Directions and contact information provided to patient.\par Education packet regarding Gamma Knife Radiosurgery provided.\par Multiple questions answered to patient's satisfaction.\par \par Dr. Olivier discussed the plan with patient's oncologist, Dr. Wasserman, at patient's request and Dr. Wasserman is in agreement to proceed with GKRS to RIGHT frontal brain met.\par Patient is scheduled for 6/9/2020 for GKRS to new RIGHT frontal brain met.\par \par Patient verbalizes agreement and understanding with plan. \par \par \par

## 2020-06-03 NOTE — ADDENDUM
[FreeTextEntry1] : 2020\par \par \par OFFICE FOLLOWUP NOTE\par \par \par Re:	Goran Lantigua \par :	42\par MRN:  [id number]\par \par \par Mr. Lantigua is a 79-year-old man with a history of metastatic lung adenocarcinoma.  He previously underwent left frontal resection of a large metastasis and then underwent adjuvant Gamma Knife radiosurgery on 2019 to the left frontal resection bed.  He has done well since surgery and adjuvant Gamma Knife.  \par \par On routine surveillance brain imaging, he was noted to have a right frontal area of new metastatic disease.  At that time, he also had some questionable radiation-induced changes versus abscess in the left frontal region.  This was noted on his MRI in 2020.  He subsequently underwent followup brain imaging in May.  On an MRI dated May 20, 2020, it again shows the new area of right frontal disease now measuring 18 mm in size with associated vasogenic cerebral edema.  Of note, he did have Gamma Knife radiosurgery to 2 areas of right frontal subcentimeter lesions in 3 fractions performed on 2019 along with the left frontal resection cavity.  Those areas of subcentimeter disease, which were treated, are no longer apparent, but the area of new enhancement consistent with metastatic disease remains and is growing.  \par \par I discussed this finding with Mr. Lantigua and his wife over the phone.  I indicated that Gamma Knife radiosurgery to this new and growing right frontal lesion is indicated to obtain local tumor growth control.  I indicated that the risks of the procedure were similar to his last procedure with the risk of symptomatic cerebral edema as well as radiation necrosis, which could result in temporary or permanent neurological symptoms.  Mr. Lantigua and his wife understand these findings.  They would like to discuss it with their oncologist at [facility] and they plan to proceed with Gamma Knife radiation once they have had the opportunity to discuss the situation with their oncologist.  We will plan for Gamma Knife in the coming weeks.\par \par \par \par \par \par Curtis Olivier M.D.\par  of Neurosurgery\[facility]\par Department of Neurosurgery\par [facility]\par [address]\par [facility], Third [place] [address]\par Tel: [phone]\par Email:  [email or web address]\par \par Dictated but not read.\par \par KOLTON/lisha [employer] #[id number]\par \par \par

## 2020-06-03 NOTE — HISTORY OF PRESENT ILLNESS
[de-identified] : 78 year old man with past medical history of BPH, hypothyroidism, metastatic adenocarcinoma of the lung, diagnosed in August 2018, (mets to lymph nodes, liver and bone) currently on chemotherapy under the care of Dr. Wasserman (maintenance pembrolizumab and Alimta; last dose on  7/12/19) referred by Dr. Estella Paz for consideration of Gamma Knife Radiosurgery for brain metastases.\par \par Mr. Lantigua presented to [facility] ED with confusion, generalized weakness x 3 days. Workup demonstrated a LEFT frontal lobe mass. He is s/p resection by Dr. Keenan Livingston on 7/30/19. Pathology indicated metastatic adenocarcinoma with papillary features, consistent with metastasis from patient's known lung adenocarcinoma. \par \par He comes today for discussion of GKRS to resection cavity and brain metastases.\par \par The patient is Cantonese speaking only and translation is provided via [employer].

## 2020-06-03 NOTE — REASON FOR VISIT
[Follow-Up: _____] : a [unfilled] follow-up visit [Spouse] : spouse [[employer] ] : provided by [employer]   [TWNoteComboBox1] : Catia [FreeTextEntry1] : \par TODAY'S VISIT:\par \par Using [employer], the patient and his wife were called to review his recent MRI brain, which demonstrates a new right frontal brain metastasis.\par \par Mr. Lantigua denies headaches, nausea/vomiting, dizziness, weakness, changes in vision/speech.\par \par PREVIOUS OFFICE VISIT 12/6/19:\par Returns today to review new MRI brain with and without contrast s/p GKRS on 9/24/19. He looks well, denies h/a, n/v peripheral vision changes. He does endorse some weakness after his chemo treatments, currently going Q3 weeks for infusion with Dr. Wasserman.

## 2020-10-28 NOTE — ED PROVIDER NOTE - PHYSICAL EXAMINATION
Physical Exam    Vital Signs: I have reviewed the initial vital signs.  Constitutional: well-nourished, appears stated age, no acute distress  Eyes: Conjunctiva pink, Sclera clear, PERRLA  Cardiovascular: S1 and S2, regular rate, regular rhythm, well-perfused extremities, radial pulses equal and 2+  Respiratory: unlabored respiratory effort, scattered rhonchi.   Gastrointestinal: soft, non-tender abdomen, no pulsatile mass, normal bowl sounds  Musculoskeletal: supple neck, no lower extremity edema, no midline tenderness  Integumentary: warm, dry, no rash  Neurologic: unresponsive, withdraws to painful stimulus, does not move extremities or open eyes spontaneously.

## 2020-10-28 NOTE — ED ADULT NURSE NOTE - NSIMPLEMENTINTERV_GEN_ALL_ED
Implemented All Fall with Harm Risk Interventions:  Depue to call system. Call bell, personal items and telephone within reach. Instruct patient to call for assistance. Room bathroom lighting operational. Non-slip footwear when patient is off stretcher. Physically safe environment: no spills, clutter or unnecessary equipment. Stretcher in lowest position, wheels locked, appropriate side rails in place. Provide visual cue, wrist band, yellow gown, etc. Monitor gait and stability. Monitor for mental status changes and reorient to person, place, and time. Review medications for side effects contributing to fall risk. Reinforce activity limits and safety measures with patient and family. Provide visual clues: red socks.

## 2020-10-28 NOTE — H&P ADULT - NSHPPHYSICALEXAM_GEN_ALL_CORE
General: No acute distress.   Male intubated sedated     HEENT: Pupils equal, reactive to light symmetrically but sluggish     PULM: Clear to auscultation bilaterally, no significant sputum production.    CVS: Regular rate and rhythm, no murmurs, rubs, or gallops. chemo port on left chest     GI: Soft, nondistended, nontender, no masses.    EXT: No edema, nontender.    SKIN: Warm and well perfused, no rashes noted.      NEURO: Unable to assess

## 2020-10-28 NOTE — CONSULT NOTE ADULT - ASSESSMENT
79 yo male, Cantonese speaking, pmh Lung ca (dx 2018) with mets to brain s/p resection (July 2019; not on seizure ppx or steroids),  on chemo last treatment (Oct 15th) ago, presents to ed with seizure lasting >30 minutes. As per wife, at baseline, no focal neurological deficits, A&Ox4. CT head showing R frontal brain mets w/ vasogenic edema and L sided postsurgical changes with possible intraparenchymal hemorrhage with or without underlying metastatic lesion. On examination, p/t s/p 6mg IV ativan, loaded w/ 1 g Keppra, not back to baseline. 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     Recommendations:   -Intubate for possible status epilepticus  -start versed  -IV resuscitation keep normotensive   -admit to ICU  -Mg 2mg x 1, keep Mg >2   -Keppra 500mg x 1, then start standing Keppra 1500mg po BID   -start Decadron 4mg po q6   -VEEG  -MRI w/wo contrast   -f/u neurosurgery recs       **attending attestation to chris****       81 yo male, Cantonese speaking, pmh Lung ca (dx 2018) with mets to brain s/p resection (July 2019; not on seizure ppx or steroids) ,  on chemo last treatment (Oct 15th) ago, presents to ed with seizure lasting >30 minutes. As per wife, at baseline, no focal neurological deficits, A&Ox4. CT head showing R frontal brain mets w/ vasogenic edema and L sided postsurgical changes with possible intraparenchymal hemorrhage with or without underlying metastatic lesion. On examination, p/t s/p 6mg IV ativan, loaded w/ 1 g Keppra, not back to baseline. 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     Recommendations:   -Intubate for possible status epilepticus  -start versed gtt  -IV resuscitation keep normotensive   -admit to ICU  -Mg 2mg x 1, keep Mg >2   -Keppra 500mg x 1 now, then start standing Keppra 1500mg po Q12 with first dose tonight  -start Decadron 4mg po q6   -VEEG  -MRI w/wo contrast after VEEG  -f/u neurosurgery recs       **attending attestation to folow****

## 2020-10-28 NOTE — CONSULT NOTE ADULT - SUBJECTIVE AND OBJECTIVE BOX
Patient is a 80y old  Male who presents with a chief complaint of ams    HPI:  81 yo male, Cantonese speaking, pmh Lung ca (dx 2018) with mets to brain s/p resection (July 2019; not on seizure ppx or steroids),  on chemo last treatment (Oct 15th) ago, presents to ed with wife. As per ems pt was unresponsive and shaking in the right upper arm and right side of face for one hour pta.  In ED pt received 6mg IV ativan and loaded with 1G keppra, not back to baseline,  no tracking, not opening eyes to pain, no convulsions, withdrawing to pain.   As per wife at bedside ( ID [id number]) patient woke up from bed at 5am this morning, found in bed, with both UE stiff and close to body. States that  had his eyes open but unresponsive. Reports that at baseline he was doing well, without any focal deficits, A&Ox4, ambulating well, sawing and building things. Wife reports no previous hx of seizure or CVA.    In ED s/p CT head showing R frontal brain mets w/ vasogenic edema and L sided postsurgical changes with possible intraparenchymal hemorrhage with or without underlying metastatic lesion. On examination, p/t s/p 6mg IV ativan, loaded w/ 1 g Keppra, not back to baseline. 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     MICU consulted for evaluation and management of AMS          PAST MEDICAL & SURGICAL HISTORY:      SOCIAL HX: UTO    FAMILY HISTORY:  :  No known cardiovascular family history     Review Of Systems:     All ROS are negative except per HPI       Allergies    No Known Allergies    Intolerances          PHYSICAL EXAM    ICU Vital Signs Last 24 Hrs  T(C): 36.3 (28 Oct 2020 08:03), Max: 36.3 (28 Oct 2020 08:03)  T(F): 97.3 (28 Oct 2020 08:03), Max: 97.3 (28 Oct 2020 08:03)  HR: 69 (28 Oct 2020 10:22) (69 - 86)  BP: 126/66 (28 Oct 2020 10:22) (86/49 - 170/81)  BP(mean): 89 (28 Oct 2020 10:08) (85 - 93)  ABP: --  ABP(mean): --  RR: 17 (28 Oct 2020 10:08) (17 - 22)  SpO2: 100% (28 Oct 2020 10:08) (99% - 100%)      CONSTITUTIONAL:  ill appearing  NAD    ENT:   ETT+  Mouth with normal mucosa.   No thrush      CARDIAC:   Normal rate,   Regular rhythm.    No edema      Vascular:   normal systolic impulse  no bruits    RESPIRATORY:   No wheezing  Bilateral BS   Not tachypneic,  No use of accessory muscles    GASTROINTESTINAL:  Abdomen soft,   Non-tender,   No guarding,   + BS      NEUROLOGICAL:   sedated. non focal      SKIN:   Skin normal color for race,   No evidence of rash.                  LABS:                          10.4   11.52 )-----------( 362      ( 28 Oct 2020 07:10 )             33.9                                               10-28    141  |  102  |  11  ----------------------------<  177<H>  4.1   |  26  |  0.9    Ca    8.3<L>      28 Oct 2020 07:05  Mg     1.9     10-28    TPro  5.7<L>  /  Alb  3.2<L>  /  TBili  0.4  /  DBili  x   /  AST  17  /  ALT  6   /  AlkPhos  78  10-28      PT/INR - ( 28 Oct 2020 07:05 )   PT: 11.50 sec;   INR: 1.00 ratio         PTT - ( 28 Oct 2020 07:05 )  PTT:22.0 sec                                           CARDIAC MARKERS ( 28 Oct 2020 07:05 )  x     / <0.01 ng/mL / x     / x     / x                                                LIVER FUNCTIONS - ( 28 Oct 2020 07:05 )  Alb: 3.2 g/dL / Pro: 5.7 g/dL / ALK PHOS: 78 U/L / ALT: 6 U/L / AST: 17 U/L / GGT: x                                                                                               Mode: AC/ CMV (Assist Control/ Continuous Mandatory Ventilation)  RR (machine): 16  TV (machine): 450  FiO2: 50  PEEP: 5  ITime: 1  MAP: 8  PIP: 17                                      ABG - ( 28 Oct 2020 08:23 )  pH, Arterial: 7.40  pH, Blood: x     /  pCO2: 44    /  pO2: 110   / HCO3: 27    / Base Excess: 2.1   /  SaO2: 98    lact 1.1      X-Rays reviewed   < from: Xray Chest 1 View-PORTABLE IMMEDIATE (10.28.20 @ 10:27) >  Endotracheal tube, feeding tube, and right chest wall Mediport are in satisfactory position. Left IJ central venous catheter tip does not cross midline    < end of copied text >                                                                        MEDICATIONS  (STANDING):    chlorhexidine 0.12% Liquid 15 milliLiter(s) Oral Mucosa every 12 hours  fentaNYL   Infusion. 0.5 MICROgram(s)/kG/Hr (2.5 mL/Hr) IV Continuous <Continuous>  midazolam Infusion 0.02 mG/kG/Hr (1 mL/Hr) IV Continuous <Continuous>  norepinephrine Infusion 0.05 MICROgram(s)/kG/Min (4.69 mL/Hr) IV Continuous <Continuous>    MEDICATIONS  (PRN):      < from: CT Head No Cont (10.28.20 @ 07:42) >    Redemonstration of post surgical changes reflecting left frontal craniotomy with gliosis/encephalomalacia in the subjacent left frontal lobe. Small focal hyperdensity in the inferior left frontal lobe could reflect a small intraparenchymal hemorrhage with or without underlying metastatic lesion. Other considerations include laminar necrosis/calcification.    Small left frontal convexity extra-axial fluid collection subjacent to the craniotomy could reflect a postoperative subdural collection with mild dystrophic calcification of the dura.    Small right frontal edema likely corresponding to the known right frontal metastasis as seen on the prior MRI dated 6/9/2020.    Dr. Claudia Pierre discussed preliminary findings with JESSE JIMENEZ on 10/28/2020 8:43 AM with readback.          < end of copied text >

## 2020-10-28 NOTE — CONSULT NOTE ADULT - SUBJECTIVE AND OBJECTIVE BOX
HPI:  79 yo male, Cantonese speaking (ID [id number]), pmh of htn, lung ca with mets to brain s/p resection on chemo last treatment was one week ago, presents to ed with wife. as per ems pt was unresponsive and shaking right upper arm and right side of face for one hour pta. in meds given in field, . In ed pt received 4mg iv ativan, still with residual shaking, s/p 2 iv ativan, now post ictal, no convulsions, withdraws to pain, pupils normal and reactive.    Called to see pt after CTH revealed:  c< from: CT Head No Cont (10.28.20 @ 07:42) >  IMPRESSION:  Redemonstration of post surgical changes reflecting left frontal craniotomy with gliosis/encephalomalacia in the subjacent left frontal lobe. Small focal hyperdensity in the inferior left frontal lobe could reflect a small intraparenchymal hemorrhage with or without underlying metastatic lesion. Other considerations include laminar necrosis/calcification.  Small left frontal convexity extra-axial fluid collection subjacent to the craniotomy could reflect a postoperative subdural collection with mild dystrophic calcification of the dura.  Small right frontal edema likely corresponding to the known right frontal metastasis as seen on the prior MRI dated 6/9/2020.  < end of copied text >    Pt seen and examined at bedside>  Neurology team and Dr Olivares at beside examining and speaking with pts wife.  She confirms that pt had episode of seizure at home and in ambulance on way to hospital.  Pt's wife sts he was not on any seizure prophylaxis after surgery in 7/2019 for craniotomy for resection of lesion.  Last chemo was approx 2 wks ago.  In ED pt found to be post ictal, not responding to verbal stimuli.  Unable to follow commands.  Not desaturating at the moment but SB/P dropping to 70-80's range. ED plan to intubate pt for airway protection.     PAST MEDICAL & SURGICAL HISTORY:      Home Medications:      Allergies    No Known Allergies    Intolerances        ROS:  [  ] A ten-point review of systems is negative except as noted   [x  ] Due to altered mental status/intubation, subjective information were not able to be obtained from the patient. History was obtained, to the extent possible, from review of the chart and collateral sources of information    MEDICATIONS  (STANDING):  etomidate Injectable 20 milliGRAM(s) IV Push once    MEDICATIONS  (PRN):      ICU Vital Signs Last 24 Hrs  T(C): 36.3 (28 Oct 2020 08:03), Max: 36.3 (28 Oct 2020 08:03)  T(F): 97.3 (28 Oct 2020 08:03), Max: 97.3 (28 Oct 2020 08:03)  HR: 77 (28 Oct 2020 08:37) (76 - 83)  BP: 101/55 (28 Oct 2020 08:37) (101/55 - 170/81)  BP(mean): 85 (28 Oct 2020 08:03) (85 - 85)  ABP: --  ABP(mean): --  RR: 18 (28 Oct 2020 08:37) (18 - 22)  SpO2: 99% (28 Oct 2020 08:37) (99% - 100%)      I&O's Detail      CBC Full  -  ( 28 Oct 2020 07:10 )  WBC Count : 11.52 K/uL  RBC Count : 3.63 M/uL  Hemoglobin : 10.4 g/dL  Hematocrit : 33.9 %  Platelet Count - Automated : 362 K/uL  Mean Cell Volume : 93.4 fL  Mean Cell Hemoglobin : 28.7 pg  Mean Cell Hemoglobin Concentration : 30.7 g/dL  Auto Neutrophil # : 7.77 K/uL  Auto Lymphocyte # : 2.47 K/uL  Auto Monocyte # : 0.49 K/uL  Auto Eosinophil # : 0.39 K/uL  Auto Basophil # : 0.21 K/uL  Auto Neutrophil % : 67.5 %  Auto Lymphocyte % : 21.4 %  Auto Monocyte % : 4.3 %  Auto Eosinophil % : 3.4 %  Auto Basophil % : 1.8 %    10-28    141  |  102  |  11  ----------------------------<  177<H>  4.1   |  26  |  0.9    Ca    8.3<L>      28 Oct 2020 07:05  Mg     1.9     10-28    TPro  5.7<L>  /  Alb  3.2<L>  /  TBili  0.4  /  DBili  x   /  AST  17  /  ALT  6   /  AlkPhos  78  10-28    CARDIAC MARKERS ( 28 Oct 2020 07:05 )  x     / <0.01 ng/mL / x     / x     / x              Obtunded, non verbal  not following commands  PERRL  opens eyes slightly to repeated noxious stimuli  Motor: withdraws ext x 4 to pain stimulus  no spontaneous movt  no clonus noted        Assessment/Plan:  No acute neurosurgical intervention at this time  would recommend intubation for airway protection  per Neurology, recommending steroids  Keppra for seizure prophylaxis  maintain tight B/P control <140  VEEG per neurology  pt will need MRI brain w/wo gado if not contraindicated  d/w attg

## 2020-10-28 NOTE — H&P ADULT - NSHPLABSRESULTS_GEN_ALL_CORE
.  LABS:                         10.4   11.52 )-----------( 362      ( 28 Oct 2020 07:10 )             33.9     10-28    141  |  102  |  11  ----------------------------<  177<H>  4.1   |  26  |  0.9    Ca    8.3<L>      28 Oct 2020 07:05  Mg     1.9     10-28    TPro  5.7<L>  /  Alb  3.2<L>  /  TBili  0.4  /  DBili  x   /  AST  17  /  ALT  6   /  AlkPhos  78  10-28    PT/INR - ( 28 Oct 2020 07:05 )   PT: 11.50 sec;   INR: 1.00 ratio         PTT - ( 28 Oct 2020 07:05 )  PTT:22.0 sec  Urinalysis Basic - ( 28 Oct 2020 11:05 )    Color: Colorless / Appearance: Clear / S.007 / pH: x  Gluc: x / Ketone: Negative  / Bili: Negative / Urobili: <2 mg/dL   Blood: x / Protein: Negative / Nitrite: Negative   Leuk Esterase: Negative / RBC: x / WBC x   Sq Epi: x / Non Sq Epi: x / Bacteria: x      Serum Pro-Brain Natriuretic Peptide: 88 pg/mL (10-28 @ 07:05)    CARDIAC MARKERS ( 28 Oct 2020 07:05 )  x     / <0.01 ng/mL / x     / x     / x        c< from: CT Head No Cont (10.28.20 @ 07:42) >    FINDINGS:    Patient is again noted to be status post left frontal craniotomy with gliosis/encephalomalacia in the subjacent left frontal lobe. In the inferior aspect of the resection cavity, there is a 1.2 x 0.7 x 1.2 cm round hyperdense lesion.    There is a small extra-axial fluid collection along the left frontal convexity subjacent to the craniotomy, outlined by hyperdense dura which may reflect dystrophic calcification.    No evidence of midline shift.    Focal area of vasogenic edema seen in the right frontal lobe corresponding to a known right frontal enhancing mass as seen on prior MRI dated 2020.    There is mild ventriculomegaly, slightly increased since the prior examination.    There is no evidence of acute territorial/transcortical infarction.    The visualized intraorbital contents are normal. The imaged portions mastoid air cells are aerated. Partially visualized parotid glands are normal.    There is partial opacification of the left frontal sinus through which there is a cranotomy defect that extends through both the inner and outer tables.      IMPRESSION:    Redemonstration of post surgical changes reflecting left frontal craniotomy with gliosis/encephalomalacia in the subjacent left frontal lobe. Small focal hyperdensity in the inferior left frontal lobe could reflect a small intraparenchymal hemorrhage with or without underlying metastatic lesion. Other considerations include laminar necrosis/calcification.    Small left frontal convexity extra-axial fluid collection subjacent to the craniotomy could reflect a postoperative subdural collection with mild dystrophic calcification of the dura.    Small right frontal edema likely corresponding to the known right frontal metastasis as seen on the prior MRI dated 2020.            RADIOLOGY, EKG & ADDITIONAL TESTS: Reviewed.

## 2020-10-28 NOTE — H&P ADULT - ASSESSMENT
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 Neurology and neurosurgery f/u. c/w Keppra. Decadron, MRI when stable , Keppra level in am    HEENT: ET care ,oral care Vent Setting unchanged     PULMONARY:  HOB @ 45 degrees.  Vent changes as follows: wean fio2.     CARDIOVASCULAR: NS bolus x 1. CHEETAH . ECHO. Wean levophed. trend CE    GI: GI prophylaxis.  OG feeds a tolerated    RENAL:  Follow up lytes.  Correct as needed    INFECTIOUS DISEASE: Follow up cultures; No ABX for now     HEMATOLOGICAL:  DVT prophylaxis if okay with neurosurgery for now SCD     ENDOCRINE:  Follow up FS.  Insulin protocol if needed.    MUSCULOSKELETAL: Bedrest. B/L VA  LE duplex    Lines: TLC Left IJ    Guarded Prognosis    Disposition: MICU monitoring

## 2020-10-28 NOTE — H&P ADULT - HISTORY OF PRESENT ILLNESS
79 yo male, Cantonese speaking, pmh Lung ca (dx 05/2018) with mets to brain s/p resection (09/2019; not on seizure ppx or steroids), radiation 2019, Chemo started 2019  complicated by one episode of unresponsives in 2020 when chemo was changed 04/2020( patient was unable to come to the hospital due to COVID) still on chemo last treatment (Oct 15th) ago, presents to ed with wife. As per wife  patient was talking and walking 30mins per day. Last known to be at baseline 10/27/2020.   As per ems pt was unresponsive and shaking in the right upper arm and right side of face for one hour pta.  In ED pt received 6mg IV ativan and loaded with 1G keppra, not back to baseline,  no tracking, not opening eyes to pain, no convulsions, withdrawing to pain.   As per wife at bedside ( ID [id number]) patient woke up from bed at 5am this morning, found in bed, with both UE stiff and close to body. States that  had his eyes open but unresponsive. Reports that at baseline he was doing well, without any focal deficits, A&Ox4, ambulating well, sawing and building things. Wife reports no previous hx of seizure or CVA.    In ED s/p CT head showing R frontal brain mets w/ vasogenic edema and L sided postsurgical changes with possible intraparenchymal hemorrhage with or without underlying metastatic lesion. On examination, p/t s/p 6mg IV ativan, loaded w/ 1 g Keppra, not back to baseline. 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     Chemotherapy Medication   Abraxane  Paclitaxel 79 yo male, Cantonese speaking, pmh Lung ca (dx 05/2018) with mets to brain s/p resection (09/2019; not on seizure ppx or steroids), radiation 2019, Chemo started 2019  complicated by one episode of unresponsives in 2020 when chemo was changed 04/2020( patient was unable to come to the hospital due to COVID) still on chemo last treatment (Oct 15th) ago, presents to ed with wife. As per wife  patient was talking and  taking walking 30mins everday. Last known to be at baseline 10/27/2020.   As per ems pt was unresponsive and shaking in the right upper arm and right side of face for one hour pta.  In ED pt received 6mg IV ativan and loaded with 1G keppra, not back to baseline,  no tracking, not opening eyes to pain, no convulsions, withdrawing to pain.   As per wife at bedside ( ID [id number] & [id number]) patient woke up from bed at 5am this morning, found in bed, with both UE stiff and close to body. States that  had his eyes open but unresponsive. Reports that at baseline he was doing well, without any focal deficits, A&Ox4, ambulating well, sawing and building things. Wife reports no previous hx of seizure or CVA.    In ED s/p CT head showing R frontal brain mets w/ vasogenic edema and L sided postsurgical changes with possible intraparenchymal hemorrhage with or without underlying metastatic lesion. On examination, p/t s/p 6mg IV ativan, loaded w/ 1 g Keppra, not back to baseline. 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     Chemotherapy Medication   Abraxane  Paclitaxel

## 2020-10-28 NOTE — CONSULT NOTE ADULT - SUBJECTIVE AND OBJECTIVE BOX
Neurology Consult    Patient is a 80y old Cantonese  Male who presents with a chief complaint of seizure    HPI:      81 yo male, Cantonese speaking, pmh Lung ca (dx 2018) with mets to brain s/p resection (July 2019; not on seizure ppx or steroids),  on chemo last treatment (Oct 15th) ago, presents to ed with wife. As per ems pt was unresponsive and shaking in the right upper arm and right side of face for one hour pta.     In ED pt received 6mg IV ativan and loaded with 1G keppra, not back to baseline,  no tracking, not opening eyes to pain, no convulsions, withdrawing to pain.       As per wife at bedside ( ID [id number]) patient woke up from bed at 5am this morning, found in bed, with both UE stiff and close to body. States that  had his eyes open but unresponsive. Reports that at baseline he was doing well, without any focal deficits, A&Ox4, ambulating well, sawing and building things. Wife reports no previous hx of seizure or CVA.        PAST MEDICAL & SURGICAL HISTORY:      FAMILY HISTORY:      Social History: (-) x 3    Allergies    No Known Allergies    Intolerances        MEDICATIONS  (STANDING):     MEDICATIONS  (PRN):      Review of systems:    unable to obtain     Vital Signs Last 24 Hrs  T(C): 36.3 (28 Oct 2020 08:03), Max: 36.3 (28 Oct 2020 08:03)  T(F): 97.3 (28 Oct 2020 08:03), Max: 97.3 (28 Oct 2020 08:03)  HR: 77 (28 Oct 2020 08:37) (76 - 83)  BP: 101/55 (28 Oct 2020 08:37) (101/55 - 170/81)  BP(mean): 85 (28 Oct 2020 08:03) (85 - 85)  RR: 18 (28 Oct 2020 08:37) (18 - 22)  SpO2: 99% (28 Oct 2020 08:37) (99% - 100%)    Neurological Examination:  Patient not opening eyes to painful stimuli, no verbal response to painful stimuli, not following commands, no tracking,  withdraws extremities to pain, sensation intact to painful stimuli in all extremities, no convulsions noted, no clonus noted, plantar response downgoing b/l.         Labs:   CBC Full  -  ( 28 Oct 2020 07:10 )  WBC Count : 11.52 K/uL  RBC Count : 3.63 M/uL  Hemoglobin : 10.4 g/dL  Hematocrit : 33.9 %  Platelet Count - Automated : 362 K/uL  Mean Cell Volume : 93.4 fL  Mean Cell Hemoglobin : 28.7 pg  Mean Cell Hemoglobin Concentration : 30.7 g/dL  Auto Neutrophil # : 7.77 K/uL  Auto Lymphocyte # : 2.47 K/uL  Auto Monocyte # : 0.49 K/uL  Auto Eosinophil # : 0.39 K/uL  Auto Basophil # : 0.21 K/uL  Auto Neutrophil % : 67.5 %  Auto Lymphocyte % : 21.4 %  Auto Monocyte % : 4.3 %  Auto Eosinophil % : 3.4 %  Auto Basophil % : 1.8 %    10-28    141  |  102  |  11  ----------------------------<  177<H>  4.1   |  26  |  0.9    Ca    8.3<L>      28 Oct 2020 07:05  Mg     1.9     10-28    TPro  5.7<L>  /  Alb  3.2<L>  /  TBili  0.4  /  DBili  x   /  AST  17  /  ALT  6   /  AlkPhos  78  10-28    LIVER FUNCTIONS - ( 28 Oct 2020 07:05 )  Alb: 3.2 g/dL / Pro: 5.7 g/dL / ALK PHOS: 78 U/L / ALT: 6 U/L / AST: 17 U/L / GGT: x           PT/INR - ( 28 Oct 2020 07:05 )   PT: 11.50 sec;   INR: 1.00 ratio         PTT - ( 28 Oct 2020 07:05 )  PTT:22.0 sec        Neuroimaging:  NCHCT: CT Head No Cont:   EXAM:  CT BRAIN            PROCEDURE DATE:  10/28/2020            INTERPRETATION:  CLINICAL INDICATION: Seizure. Lung cancer with intracranial metastatic disease.    TECHNIQUE: CT of the head was performed without the administration of intravenous contrast.    COMPARISON: None.    CORRELATION:  MR HEAD 6/9/2020.    FINDINGS:    Patient is again noted to be status post left frontal craniotomy with gliosis/encephalomalacia in the subjacent left frontal lobe. In the inferior aspect of the resection cavity, there is a 1.2 x 0.7 x 1.2 cm round hyperdense lesion.    There is a small extra-axial fluid collection along the left frontal convexity subjacent to the craniotomy, outlined by hyperdense dura which may reflect dystrophic calcification.    No evidence of midline shift.    Focal area of vasogenic edema seen in the right frontal lobe corresponding to a known right frontal enhancing mass as seen on prior MRI dated 6/9/2020.    There is mild ventriculomegaly, slightly increased since the prior examination.    There is no evidence of acute territorial/transcortical infarction.    The visualized intraorbital contents are normal. The imaged portions mastoid air cells are aerated. Partially visualized parotid glands are normal.    There is partial opacification of the left frontal sinus through which there is a cranotomy defect that extends through both the inner and outer tables.      IMPRESSION:    Redemonstration of post surgical changes reflecting left frontal craniotomy with gliosis/encephalomalacia in the subjacent left frontal lobe. Small focal hyperdensity in the inferior left frontal lobe could reflect a small intraparenchymal hemorrhage with or without underlying metastatic lesion. Other considerations include laminar necrosis/calcification.    Small left frontal convexity extra-axial fluid collection subjacent to the craniotomy could reflect a postoperative subdural collection with mild dystrophic calcification of the dura.    Small right frontal edema likely corresponding to the known right frontal metastasis as seen on the prior MRI dated 6/9/2020.    Dr. Hannah Pierre discussed preliminary findings with JESSE JIMENEZ on 10/28/2020 8:43 AM with readback.            HANNAH PIERRE M.D., RESIDENT RADIOLOGIST  This document has been electronically signed.  MOOSE PYLE M.D., ATTENDING RADIOLOGIST  This document has been electronically signed. Oct 28 2020  9:36AM (10-28-20 @ 07:42)      10-28-20 @ 09:51       Neurology Consult    Patient is a 80y old Cantonese  Male who presents with a chief complaint of seizure    HPI:      79 yo male, Cantonese speaking, pmh Lung ca (dx 2018) with mets to brain s/p resection (July 2019; not on seizure ppx or steroids),  on chemo last treatment (Oct 15th) ago, presents to ed with wife. As per ems pt was unresponsive and shaking in the right upper arm and right side of face for one hour pta.  As per wife at bedside ( ID [id number]) patient woke up from bed at 5am this morning, found in bed, with both UE stiff and close to body. States that  had his eyes open but unresponsive. Reports that at baseline he was doing well, without any focal deficits, A&Ox4, ambulating well, sawing and building things. Wife reports no previous hx of seizure or CVA.  Denies any residual side effects after tumor resection done at [facility] 2019.      In ED pt received 7mg IV ativan and loaded with 1G keppra, not back to baseline,  no tracking, not opening eyes to pain, no convulsions, withdrawing to pain.     PAST MEDICAL & SURGICAL HISTORY:    FAMILY HISTORY:    Social History: (-) x 3    Allergies    No Known Allergies    Intolerances    MEDICATIONS  (STANDING):     MEDICATIONS  (PRN):    Review of systems:    unable to obtain     Vital Signs Last 24 Hrs  T(C): 36.3 (28 Oct 2020 08:03), Max: 36.3 (28 Oct 2020 08:03)  T(F): 97.3 (28 Oct 2020 08:03), Max: 97.3 (28 Oct 2020 08:03)  HR: 77 (28 Oct 2020 08:37) (76 - 83)  BP: 101/55 (28 Oct 2020 08:37) (101/55 - 170/81)  BP(mean): 85 (28 Oct 2020 08:03) (85 - 85)  RR: 18 (28 Oct 2020 08:37) (18 - 22)  SpO2: 99% (28 Oct 2020 08:37) (99% - 100%)    Neurological Examination:  Patient not opening eyes to painful stimuli, no verbal response to painful stimuli, not following commands, no tracking,  withdraws extremities to pain, sensation intact to painful stimuli in all extremities, no convulsions noted, no clonus noted, plantar response downgoing b/l.     Labs:   CBC Full  -  ( 28 Oct 2020 07:10 )  WBC Count : 11.52 K/uL  RBC Count : 3.63 M/uL  Hemoglobin : 10.4 g/dL  Hematocrit : 33.9 %  Platelet Count - Automated : 362 K/uL  Mean Cell Volume : 93.4 fL  Mean Cell Hemoglobin : 28.7 pg  Mean Cell Hemoglobin Concentration : 30.7 g/dL  Auto Neutrophil # : 7.77 K/uL  Auto Lymphocyte # : 2.47 K/uL  Auto Monocyte # : 0.49 K/uL  Auto Eosinophil # : 0.39 K/uL  Auto Basophil # : 0.21 K/uL  Auto Neutrophil % : 67.5 %  Auto Lymphocyte % : 21.4 %  Auto Monocyte % : 4.3 %  Auto Eosinophil % : 3.4 %  Auto Basophil % : 1.8 %    10-28    141  |  102  |  11  ----------------------------<  177<H>  4.1   |  26  |  0.9    Ca    8.3<L>      28 Oct 2020 07:05  Mg     1.9     10-28    TPro  5.7<L>  /  Alb  3.2<L>  /  TBili  0.4  /  DBili  x   /  AST  17  /  ALT  6   /  AlkPhos  78  10-28    LIVER FUNCTIONS - ( 28 Oct 2020 07:05 )  Alb: 3.2 g/dL / Pro: 5.7 g/dL / ALK PHOS: 78 U/L / ALT: 6 U/L / AST: 17 U/L / GGT: x           PT/INR - ( 28 Oct 2020 07:05 )   PT: 11.50 sec;   INR: 1.00 ratio       PTT - ( 28 Oct 2020 07:05 )  PTT:22.0 sec    Neuroimaging:  NCHCT: CT Head No Cont:   EXAM:  CT BRAIN          PROCEDURE DATE:  10/28/2020      INTERPRETATION:  CLINICAL INDICATION: Seizure. Lung cancer with intracranial metastatic disease.    TECHNIQUE: CT of the head was performed without the administration of intravenous contrast.    COMPARISON: None.    CORRELATION:  MR HEAD 6/9/2020.    FINDINGS:    Patient is again noted to be status post left frontal craniotomy with gliosis/encephalomalacia in the subjacent left frontal lobe. In the inferior aspect of the resection cavity, there is a 1.2 x 0.7 x 1.2 cm round hyperdense lesion.    There is a small extra-axial fluid collection along the left frontal convexity subjacent to the craniotomy, outlined by hyperdense dura which may reflect dystrophic calcification.    No evidence of midline shift.    Focal area of vasogenic edema seen in the right frontal lobe corresponding to a known right frontal enhancing mass as seen on prior MRI dated 6/9/2020.    There is mild ventriculomegaly, slightly increased since the prior examination.    There is no evidence of acute territorial/transcortical infarction.    The visualized intraorbital contents are normal. The imaged portions mastoid air cells are aerated. Partially visualized parotid glands are normal.    There is partial opacification of the left frontal sinus through which there is a cranotomy defect that extends through both the inner and outer tables.      IMPRESSION:    Redemonstration of post surgical changes reflecting left frontal craniotomy with gliosis/encephalomalacia in the subjacent left frontal lobe. Small focal hyperdensity in the inferior left frontal lobe could reflect a small intraparenchymal hemorrhage with or without underlying metastatic lesion. Other considerations include laminar necrosis/calcification.    Small left frontal convexity extra-axial fluid collection subjacent to the craniotomy could reflect a postoperative subdural collection with mild dystrophic calcification of the dura.    Small right frontal edema likely corresponding to the known right frontal metastasis as seen on the prior MRI dated 6/9/2020.    Dr. Hannah Pierre discussed preliminary findings with JESSE JIMENEZ on 10/28/2020 8:43 AM with readback.    HANNAH PIERRE M.D., RESIDENT RADIOLOGIST  This document has been electronically signed.  MOOSE PYLE M.D., ATTENDING RADIOLOGIST  This document has been electronically signed. Oct 28 2020  9:36AM (10-28-20 @ 07:42)      10-28-20 @ 09:51

## 2020-10-28 NOTE — ED PROVIDER NOTE - ATTENDING CONTRIBUTION TO CARE
81 yo m hx lung ca w/ mets to brain (follows up at [facility]), htn s/p brain surgery presents w/ ams/sz like activity. at triage, pt was having R arm shaking, R eye twitching and confusion. pt was immediately brought to [facility] and treated for sz. according to family, sx were present at 6am.  family also states pt was found drooling and appeared SOB.  pt has had worsening of chronic cough x 2 weeks. no other known complaints. pt altered and unable to provide information.  no AC or antiplt. FS in field >200    vs soft blood pressure, hypoxemic  gen- elderly, frail  card-rrr  lungs-breathing sponaneously, on NRB, RLL crackles  abd-sntnd, no guarding or rebound  neuro- responding to pain in all extremities, not cooperative w/ exam    status, s/p tx w/ ativan and keppra now broken  given cough, hypotension, will treat empirically for sepsis  CTH r/o bleed  pt high risk to need emergent intubation, MS slowly improving s/p ativan.t. will monitor closely off vent with low threshold to intubate (given lung ca, pt would likely be difficult to ween off vent)  r/o lyte abn  neuro c/s

## 2020-10-28 NOTE — ED PROCEDURE NOTE - CPROC ED INFUS LINE DETAIL1
The location was identified, and the area was draped and prepped./The catheter was placed using sterile technique./All lumen(s) aspirated and flushed without difficulty./Ultrasound guidance was used during placement./The guidewire was recovered.

## 2020-10-28 NOTE — ED ADULT TRIAGE NOTE - CHIEF COMPLAINT QUOTE
Pt was unresponsive when the wife tried to wake him up, sob and drooling. Unable to obtain temperature.

## 2020-10-28 NOTE — ED PROVIDER NOTE - CARE PLAN
Principal Discharge DX:	Status epilepticus  Secondary Diagnosis:	Hypotension  Secondary Diagnosis:	Lung cancer  Secondary Diagnosis:	Brain metastasis  Secondary Diagnosis:	Brain bleed

## 2020-10-28 NOTE — ED PROVIDER NOTE - PROGRESS NOTE DETAILS
had conversation with wife with Cantonese , pt is full code, all of history provided from . As per wife, brain surgery was july 2019, chemo is 3 times per month. Pt now moving extremities spontaneously, spoke with tino Olivares, will see pt in ed. neuro surg PA Cinthia aware, ICU fellow aware, aware of CT head findings. Pt endorsed to Dr. Ferrer- pending monitoring, neuro/nsurg/ICU recs per neurology spoke with wife, patient still not at baseline, still intermitently sleepy, given possibiltiy of subclinical seizure will intubate for airway protection, add versed for seizure control. Patients bp low will start noreepinephrine for BP and place central line.

## 2020-10-28 NOTE — ED PROCEDURE NOTE - CPROC ED GASTRIC INTUB DETAIL1
The nasogastric tube (see size above) was inserted via the anatomic location./Placement was confirmed by aspiration of gastric secretions.

## 2020-10-28 NOTE — CONSULT NOTE ADULT - ATTENDING COMMENTS
I have personally seen and examined this patient.  I have fully participated in the care of this patient.  I have reviewed all pertinent clinical information, including history, physical exam, plan and note.   I have reviewed all pertinent clinical information and reviewed all relevant imaging and diagnostic studies personally.  Pt w/ lung ca w/ brain metastases s/p L frontal resection currently with evidence of new brain met R side with ? worsening L vasogenic edema suspicious for recurrence of tumor now with new onset GTC followed by unresponsiveness w/ high suspicion for NCSE.  Recommendations as above.  Agree with above assessment except as noted.

## 2020-10-28 NOTE — CONSULT NOTE ADULT - ASSESSMENT
IMPRESSION:    Status Epilepticus  S/P Intubation For Airway Protection  AMS- Likely Post-ictal  H/O Lung Cancer with Mets to brain s/p resection (2018) now on Chemo   Metastatic lesions of brain cannot R/O Intraparenchymal hemorrhage      PLAN:    CNS: Sedation with versed and fentanyl until VEEG performed. Neurology and neurosurgery f/u. c/w Keppra. Decadron    HEENT: ET care ,oral care    PULMONARY:  HOB @ 45 degrees.  Vent changes as follows: wean fio2.     CARDIOVASCULAR: NS bolus x 1. CHEETAH . ECHO. Wean levophed. trend CE    GI: GI prophylaxis.  OG feeds a tolerated    RENAL:  Follow up lytes.  Correct as needed    INFECTIOUS DISEASE: Follow up cultures; No ABX for now     HEMATOLOGICAL:  DVT prophylaxis if okay with neurosurgery    ENDOCRINE:  Follow up FS.  Insulin protocol if needed.    MUSCULOSKELETAL: Bedrest. B/L VA  LE duplex    Lines: TLC Left IJ    will need to get patient's records and home medication list.  Guarded Prognosis    Plan of care d/w son and wife at bedside    Disposition: MICU monitoring         IMPRESSION:    Status Epilepticus  S/P Intubation For Airway Protection  AMS- Likely Post-ictal  H/O Lung Cancer with Mets to brain s/p resection (2018) now on Chemo   Metastatic lesions of brain cannot R/O Intraparenchymal hemorrhage      PLAN:    CNS: Sedation with versed and fentanyl until VEEG performed. Neurology and neurosurgery f/u. c/w Keppra. Decadron    HEENT: ET care ,oral care    PULMONARY:  HOB @ 45 degrees.  Vent changes as follows: wean fio2.     CARDIOVASCULAR: NS bolus x 1. CHEETAH . ECHO. Wean levophed. trend CE    GI: GI prophylaxis.  OG feeds a tolerated    RENAL:  Follow up lytes.  Correct as needed    INFECTIOUS DISEASE: Follow up cultures; No ABX for now     HEMATOLOGICAL:  DVT prophylaxis     ENDOCRINE:  Follow up FS.  Insulin protocol if needed.    MUSCULOSKELETAL: Bedrest. B/L VA  LE duplex    Lines: TLC Left IJ    will need to get patient's records and home medication list.  Guarded Prognosis    Plan of care d/w son and wife at bedside    Disposition: MICU monitoring

## 2020-10-28 NOTE — ED PROVIDER NOTE - CLINICAL SUMMARY MEDICAL DECISION MAKING FREE TEXT BOX
patient evaluated for refractory seizure likely 2/2 cancer with multiple escalating antiepileptics, patient was intubated for airway protection, labs were obtained, consultants included neuro and neur sx, icu. patietn was given abx and ivf with concern for sepsis. after ivf patient is still hypotensive and required pressors.

## 2020-10-29 NOTE — DIETITIAN INITIAL EVALUATION ADULT. - REASON INDICATOR FOR ASSESSMENT
Found to have BMI<19 during RD screen - RN spoken with at bedside, pt appeared to have understand what I was telling him in Cantonese and seems like he was following commands (with his eyes closed) but lacks strength in hands and feet/toes. Refer to RN for details. Otherwise, Pt is NPO. LBM unknown. Stage 1 to B/L [place], No edema. on VEEG. has NGT. Intubated to ventilator. Ve 6.27, Temp 36.2c, /62, MAP 74.  Contacted son/daughter at [phone] for nutrition hx. Son planning to visit.

## 2020-10-29 NOTE — PROGRESS NOTE ADULT - ASSESSMENT
# Status Epilepticus  # AMS- Likely Post-ictal  # S/P Intubation For Airway Protection  - Sedation with versed Until VEEG performed. Neurology and neurosurgery f/u. c/w Keppra. Decadron    # H/O Lung Cancer with Mets to brain   - s/p resection (2018)   - now on Chemo     # Metastatic lesions of brain cannot R/O Intraparenchymal hemorrhage      IV Line: TLC L IJ  DVT PPX: SCD  Activity : Bedrest  Diet: OG feeds  Disposition: MICU monitoring       # Status Epilepticus  # AMS- Likely Post-ictal  # S/P Intubation For Airway Protection  - Sedation with versed Until VEEG performed. Neurology and neurosurgery f/u. c/w Keppra. Decadron    # H/O Lung Cancer with Mets to brain   - s/p resection (2018)   - now on Chemo     # Metastatic lesions of brain cannot R/O Intraparenchymal hemorrhage      IV Line: TLC L IJ  DVT PPX: SCD  Activity : Bedrest  Diet: OG feeds  Disposition: MICU monitoring       # Status Epilepticus  # AMS- Likely Post-ictal  # S/P Intubation For Airway Protection  - Sedation with versed Until VEEG performed. Neurology and neurosurgery f/u. c/w Keppra. Decadron    # H/O Lung Cancer with Mets to brain   - s/p resection (2018)   - now on Chemo     # Metastatic lesions of brain cannot R/O Intraparenchymal hemorrhage  - Neurosurgery follow up  - MRI brain w/wo gado once stable        IV Line: TLC L IJ  DVT PPX: SCD  Activity : Bedrest  Diet: OG feeds  Disposition: MICU monitoring       # Status Epilepticus  # AMS- Likely Post-ictal  # S/P Intubation For Airway Protection  - Sedation with versed Until VEEG performed. Neurology and neurosurgery f/u. c/w Keppra. Decadron    # H/O Lung Cancer with Mets to brain   - s/p resection (2018)   - now on Chemo     # Metastatic lesions of brain cannot R/O Intraparenchymal hemorrhage  - Neurosurgery follow up  - MRI brain w/wo gado once stable    IV Line: TLC L IJ  DVT PPX: SCD  Activity : Bedrest  Diet: OG feeds  Disposition: MICU monitoring

## 2020-10-29 NOTE — DIETITIAN INITIAL EVALUATION ADULT. - ORAL INTAKE PTA/DIET HISTORY
PTA per son on the phone, pt eats average, just enough, as pt prefers chinese foods and he does have rice in most meals. before chemotherapy pt ate slightly better but because of the chemo, pt has been trying to eat as much as he can at home. His wife cooks. No vitamin use but takes some form of NUTRITIONAL supplemental powder every morning that max with his foods. NKFA. UBW around 110# and stable per family.

## 2020-10-29 NOTE — CONSULT NOTE ADULT - SUBJECTIVE AND OBJECTIVE BOX
Neurocritical Care Progress Note:    1. Brief Presentation:  As per ems pt was unresponsive and shaking in the right upper arm and right side of face for one hour pta.  2. Today's Acute Problems:  Status epilepticus, Intubation  3. Relevant brief History:  HPI:  79 yo male, Cantonese speaking, pmh Lung ca (dx 2018) with mets to brain s/p resection (2019; not on seizure ppx or steroids), radiation , Chemo started   complicated by one episode of unresponsives in  when chemo was changed 2020( patient was unable to come to the hospital due to COVID) still on chemo last treatment (Oct 15th) ago, presents to ed with wife. As per wife  patient was talking and  taking walking 30mins everday. Last known to be at baseline 10/27/2020.   As per ems pt was unresponsive and shaking in the right upper arm and right side of face for one hour pta.  In ED pt received 6mg IV ativan and loaded with 1G keppra, not back to baseline,  no tracking, not opening eyes to pain, no convulsions, withdrawing to pain.   As per wife at bedside ( ID [id number] & [id number]) patient woke up from bed at 5am this morning, found in bed, with both UE stiff and close to body. States that  had his eyes open but unresponsive. Reports that at baseline he was doing well, without any focal deficits, A&Ox4, ambulating well, sawing and building things. Wife reports no previous hx of seizure or CVA.    In ED s/p CT head showing R frontal brain mets w/ vasogenic edema and L sided postsurgical changes with possible intraparenchymal hemorrhage with or without underlying metastatic lesion. On examination, p/t s/p 6mg IV ativan, loaded w/ 1 g Keppra, not back to baseline. 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     Chemotherapy Medication   Abraxane  Paclitaxel (28 Oct 2020 12:45)    4-Yesterday's Plan:  NA  5. Last 24 hour updates:  NA  6. Medications:   chlorhexidine 0.12% Liquid 15 milliLiter(s) Oral Mucosa every 12 hours  chlorhexidine 4% Liquid 1 Application(s) Topical <User Schedule>  dexAMETHasone     Tablet 4 milliGRAM(s) Oral every 6 hours  levETIRAcetam  IVPB 1500 milliGRAM(s) IV Intermittent every 12 hours  midazolam Infusion 0.02 mG/kG/Hr IV Continuous <Continuous>  norepinephrine Infusion 0.05 MICROgram(s)/kG/Min IV Continuous <Continuous>      7. Ancillary Management:   Chest PT[ ]   Head of bed >35 [ x  Out of bed to chair [ ]   PT/OT/SP Eval [ ]   Spirometry[ ]   DVT prophalaxis[ x]    8.Neuro:   Awake: Spontaneously[ ] Occasionally[ ] To Voice [ ] To painful stimuli [ ]      [x] none  AIert [ ]. Following commands: 3 steps[ ], 2 steps[ ], 1 step [ ], None [x ]   Orientation: 0[x ], 1[ ], 2[ ], 3[ ]. Tracking objects with eyes: [ ]   Language: NA  Time off sedation for exam: NA  Pupils: Right   3>2   Left    3>2      Corneal:  +    Gag reflex:   +  EOMI: no gaze      mRS:5  0 No symptoms at all  1 No significant disability despite symptoms; able to carry out all usual duties and activities without assistance  2 Slight disability; unable to carry out all previous activities, but able to look after own affairs  3 Moderate disability; requiring some help, but able to walk without assistance  4 Moderately severe disability; unable to walk without assistance and unable to attend to own bodily needs without assistance  5 Severe disability; bedridden, incontinent and requiring constant nursing care and attention  6 Dead    ICHs:  Age >=80  GCS Score: 3-4 (2 pts)   GCS Score: 5-12 (1 pt)   ICH Volume: >30  (+) IVH  (+) Infratentorial    Giles&Salomon:  Grade I = Asymptomatic, mild headache, slight nuchal rigidity  Grade II = Moderate to severe headache, nuchal rigidity, no neurological deficit other than cranial nerve palsy  Grade III = Drowiness, confusion, mild focal neurological deficit  Grade IV = Stupor, moderate to severe hemiparesis  Grade V = Coma, decerebrate posturing    m-Otero:  Grade 0   (No Subarachnoid Hemorrhage (SAH)), No intraventricular hemorrhage (IVH), Incidence of symptomatic vasopasm 0%  Grade 1   (Focal or diffuse, thin SAH), No IVH, incidence of symptomatic vasospasm 24%  Grade 2   (Thin focal or diffuse SAH), IVH present, incidence of symptomatic vasospasm 33%  Grade 3   (Thick focal or diffuse SAH), No IVH, incidence of symptomatic vasospasm 33%  Grade 4   (Thick focal or diffuse SAH), IVH present, incidence of symptomatic vasosasm 40%    Last CTH:  CT Head No Cont:   EXAM:  CT BRAIN              IMPRESSION:    Redemonstration of post surgical changes reflecting left frontal craniotomy with gliosis/encephalomalacia in the subjacent left frontal lobe. Small focal hyperdensity in the inferior left frontal lobe could reflect a small intraparenchymal hemorrhage with or without underlying metastatic lesion. Other considerations include laminar necrosis/calcification.    Small left frontal convexity extra-axial fluid collection subjacent to the craniotomy could reflect a postoperative subdural collection with mild dystrophic calcification of the dura.    Small right frontal edema likely corresponding to the known right frontal metastasis as seen on the prior MRI dated 2020.    Last CTA/MRA:    Last CTP:    Last MRI:    Last TCD:    Last EEG:    EVD: [ ] [place]: [ ]     ICP:     CPP:     Level(cm):     24hr(ml):     CSF:     W:     R:     Cx:     P:     G:     LA:       Gram stain:    9. Cardiovascular:   Vital Signs Last 24 Hrs  T(C): 36.3 (28 Oct 2020 08:03), Max: 36.3 (28 Oct 2020 08:03)  T(F): 97.3 (28 Oct 2020 08:03), Max: 97.3 (28 Oct 2020 08:03)  HR: 63 (28 Oct 2020 22:39) (55 - 86)  BP: 108/60 (28 Oct 2020 22:39) (86/49 - 170/81)  BP(mean): 69 (28 Oct 2020 17:20) (69 - 93)  RR: 18 (28 Oct 2020 22:39) (16 - 22)  SpO2: 100% (28 Oct 2020 22:39) (99% - 100%)     Last Echo:    Last EKG:    CVP   MAP/CPP/SBP target:   CO:      CI:       Enzymes/Trop:    10. Respiratory:   ABG:ABG - ( 28 Oct 2020 16:29 )  pH, Arterial: 7.44  pH, Blood: x     /  pCO2: 42    /  pO2: 147   / HCO3: 28    / Base Excess: 3.6   /  SaO2: 99                  VBG:    Chest Xray:  < from: Xray Chest 1 View- PORTABLE-Urgent (Xray Chest 1 View- PORTABLE-Urgent .) (10.28.20 @ 11:00) >  Impression:    Unchanged left perihilar mass.    Tubes and lines positioned appropriately.      < end of copied text >    Mode: AC/ CMV (Assist Control/ Continuous Mandatory Ventilation)  RR (machine): 14  TV (machine): 400  FiO2: 30  PEEP: 5  ITime: 1  MAP: 9  PIP: 21      Peak Pressure/Chandler Pressure:    11.GI:    Prophalaxis:     Bowel mvt:     Abd distension:   LIVER FUNCTIONS - ( 28 Oct 2020 07:05 )  Alb: 3.2 g/dL / Pro: 5.7 g/dL / ALK PHOS: 78 U/L / ALT: 6 U/L / AST: 17 U/L / GGT: x             12.Renal/Fluids/Electrolytes:    10-28    141  |  102  |  11  ----------------------------<  177<H>  4.1   |  26  |  0.9    Ca    8.3<L>      28 Oct 2020 07:05  Mg     1.9     10-28    TPro  5.7<L>  /  Alb  3.2<L>  /  TBili  0.4  /  DBili  x   /  AST  17  /  ALT  6   /  AlkPhos  78  10-28      I&O's Detail    28 Oct 2020 07:01  -  29 Oct 2020 00:43  --------------------------------------------------------  IN:  Total IN: 0 mL    OUT:    Indwelling Catheter - Urethral (mL): 2500 mL  Total OUT: 2500 mL    Total NET: -2500 mL          13.ID:   TMax: 97.3  Vital Signs Last 24 Hrs  T(C): 36.3 (28 Oct 2020 08:03), Max: 36.3 (28 Oct 2020 08:03)  T(F): 97.3 (28 Oct 2020 08:03), Max: 97.3 (28 Oct 2020 08:03)  HR: 63 (28 Oct 2020 22:39) (55 - 86)  BP: 108/60 (28 Oct 2020 22:39) (86/49 - 170/81)  BP(mean): 69 (28 Oct 2020 17:20) (69 - 93)  RR: 18 (28 Oct 2020 22:39) (16 - 22)  SpO2: 100% (28 Oct 2020 22:39) (99% - 100%)      Urinalysis Basic - ( 28 Oct 2020 11:05 )    Color: Colorless / Appearance: Clear / S.007 / pH: x  Gluc: x / Ketone: Negative  / Bili: Negative / Urobili: <2 mg/dL   Blood: x / Protein: Negative / Nitrite: Negative   Leuk Esterase: Negative / RBC: x / WBC x   Sq Epi: x / Non Sq Epi: x / Bacteria: x         Lines: Central[] Date inserted: Peripheral[]    14. Hematology:                         10.4   11.52 )-----------( 362      ( 28 Oct 2020 07:10 )             33.9      10    141  |  102  |  11  ----------------------------<  177<H>  4.1   |  26  |  0.9    Ca    8.3<L>      28 Oct 2020 07:05  Mg     1.9     10    TPro  5.7<L>  /  Alb  3.2<L>  /  TBili  0.4  /  DBili  x   /  AST  17  /  ALT  6   /  AlkPhos  78  10     PT/INR - ( 28 Oct 2020 07:05 )   PT: 11.50 sec;   INR: 1.00 ratio         PTT - ( 28 Oct 2020 07:05 )  PTT:22.0 sec    DVT Prophylaxis Lovenox[ ] Heparin[ ] Venodynes[ ] SCD's[x ]

## 2020-10-29 NOTE — CONSULT NOTE ADULT - ATTENDING COMMENTS
Patient seen today with neurocritical care NP Sarmad and MICU resident/fellow team.   I was physically present for the key portions of the evaluation and management (E/M) service provided.  I agree with the above history, physical, and plan with the following additions/modifications     Patient with prior brain mets and resection last year, now with new onset status epilepticus.  CT showing resection cavity with laminar necrosis, and new edema and hemorrhage.  EEG this morning witout active seizures or interictal activity.  Exam notable for poor level of alertness (on very low midaz drip) but generally non-focal/lateralized.    -Recommend stopping midazolam, continue keppra 1500mg BID and high dose dex, and repeat CT for stability of small hemorrhage.  if hemorrhage stable will consider SBT and extubation. Continue CEEG, high risk of recurrent seizures.      Patient at high risk for neurological deterioration or death due to:  intracranial hemorrhgae, cerebral edema, status epilepticus  Critical care time:  I have personally provided  50  minutes of critical care time, excluding time spent on separately-billable procedures.      Plan discussed with RN, PA team.     Minh Shepherd MD  Attending Neurointensivist

## 2020-10-29 NOTE — DIETITIAN INITIAL EVALUATION ADULT. - CONTINUE CURRENT NUTRITION CARE PLAN
pt to meet and tolerate 85% of estimated kcal/protein via TF upon f/u in 4 days if pt to fail extubation. If pt to be extubated, consume and tolerate >75% of all meals and snacks upon f/u in 4 days. Meals and snacks. Enteral nutrition. Medical food supplement. RD to monitor diet order, energy intake, NFPF (EN tolerance, PO tolerance, skin). coordination of care.

## 2020-10-29 NOTE — DIETITIAN INITIAL EVALUATION ADULT. - ADD RECOMMEND
(1) When medically feasible if pt to remains intubated, start tube feed with OSMOLITE 1.0 at 40cc/hr with no-carb Prosource TF packet x4/day. This regimen gives a total of 1160 kcal/ 86g protein/ 806mL free water, additional flushes per ICU team.  Keep MAP>65.  (2) if pt is extubated, and pass bedside or SLP evaluation for swallowing, start diet with a GOAL of REGULAR diet with Ensure Enlive q24hr.

## 2020-10-29 NOTE — PROGRESS NOTE ADULT - SUBJECTIVE AND OBJECTIVE BOX
SUBJECTIVE:   LENGTH OF HOSPITAL STAY: 1d    CHIEF COMPLAINT:  Patient is a 80y old  Male who presents with a chief complaint of Seizures and Altered  Mental Status (29 Oct 2020 12:46)      Events over the past 24 hours:  Patient was seen and examined in the bedside this morning. The patient is lying comfortably on the bed. There were no acute events overnight.    REVIEW OF SYSTEMS  Negative Except as mentioned above.    ALLERGIES:  No Known Allergies      MEDICATIONS:  STANDING MEDICATIONS  chlorhexidine 0.12% Liquid 15 milliLiter(s) Oral Mucosa every 12 hours  chlorhexidine 4% Liquid 1 Application(s) Topical <User Schedule>  dexAMETHasone     Tablet 4 milliGRAM(s) Oral every 6 hours  levETIRAcetam  IVPB 1500 milliGRAM(s) IV Intermittent every 12 hours  midazolam Infusion. 0.02 mG/kG/Hr IV Continuous <Continuous>  pantoprazole  Injectable 40 milliGRAM(s) IV Push daily    PRN MEDICATIONS        OBJECTIVE:  VITALS:   T(F): 96.6, Max: 97.9 (10-28-20)  HR: 68 (46 - 72)  BP: 131/78 (86/57 - 150/71)  RR: 18 (9 - 21)  SpO2: 100% (98% - 100%)    I&O's:   I&O's Summary    28 Oct 2020 07:  -  29 Oct 2020 07:00  --------------------------------------------------------  IN: 144.4 mL / OUT: 2845 mL / NET: -2700.6 mL    29 Oct 2020 07:  -  29 Oct 2020 19:55  --------------------------------------------------------  IN: 114.3 mL / OUT: 379 mL / NET: -264.7 mL        PHYSICAL EXAM:  General: No acute distress  HEENT: Normocephalic, atraumatic, PERRLA, EOMI - Normal, No pallor, icterus, cyanosis; JVP- not elevated  PULM: b/l equal and clear breath sounds, no wheeze, crepitations  CVS: s1s2 normal, no murmurs, rubs or gallops  GI: Abdomen is soft, non-tender, no organomegaly, BS +  MSK: No joint/limb swelling, tenderness, deformity  SKIN: No rashes noted  NEURO: Alert and Oriented x 3, no focal neurological deficits noted    LABS:                        10.2   20.73 )-----------( 323      ( 29 Oct 2020 04:25 )             32.3             10-29    140  |  104  |  13  ----------------------------<  154<H>  3.8   |  25  |  0.8    Ca    7.7<L>      29 Oct 2020 04:25  Mg     2.3     10-29    TPro  5.3<L>  /  Alb  3.0<L>  /  TBili  0.4  /  DBili  x   /  AST  14  /  ALT  6   /  AlkPhos  72  10-29    LIVER FUNCTIONS - ( 29 Oct 2020 04:25 )  Alb: 3.0 g/dL / Pro: 5.3 g/dL / ALK PHOS: 72 U/L / ALT: 6 U/L / AST: 14 U/L / GGT: x                 PT/INR - ( 28 Oct 2020 07:05 )   PT: 11.50 sec;   INR: 1.00 ratio         PTT - ( 28 Oct 2020 07:05 )  PTT:22.0 sec  CARDIAC MARKERS ( 28 Oct 2020 07:05 )  x     / <0.01 ng/mL / x     / x     / x          ABG - ( 29 Oct 2020 03:00 )  pH, Arterial: 7.43  pH, Blood: x     /  pCO2: 42    /  pO2: 91    / HCO3: 28    / Base Excess: 3.2   /  SaO2: 99                Urinalysis Basic - ( 28 Oct 2020 11:05 )    Color: Colorless / Appearance: Clear / S.007 / pH: x  Gluc: x / Ketone: Negative  / Bili: Negative / Urobili: <2 mg/dL   Blood: x / Protein: Negative / Nitrite: Negative   Leuk Esterase: Negative / RBC: x / WBC x   Sq Epi: x / Non Sq Epi: x / Bacteria: x        Culture - Urine (collected 28 Oct 2020 11:05)  Source: .Urine Catheterized  Final Report (29 Oct 2020 13:27):    No growth    Culture - Blood (collected 28 Oct 2020 07:20)  Source: .Blood Blood-Peripheral  Preliminary Report (29 Oct 2020 13:02):    No growth to date.    Culture - Blood (collected 28 Oct 2020 07:20)  Source: .Blood Blood-Peripheral  Preliminary Report (29 Oct 2020 13:02):    No growth to date.      CAPILLARY BLOOD GLUCOSE      POCT Blood Glucose.: 147 mg/dL (29 Oct 2020 19:11)  POCT Blood Glucose.: 155 mg/dL (29 Oct 2020 13:50)  POCT Blood Glucose.: 163 mg/dL (29 Oct 2020 05:51)        RADIOLOGY & ADDITIONAL TESTS:                       SUBJECTIVE:   LENGTH OF HOSPITAL STAY: 1d    CHIEF COMPLAINT:  Patient is a 80y old  Male who presents with a chief complaint of Seizures and Altered  Mental Status (29 Oct 2020 12:46)      Events over the past 24 hours:  Patient was seen and examined in the bedside this morning. The patient is sedated and is on Mechanical ventilation.     REVIEW OF SYSTEMS  Negative Except as mentioned above.    ALLERGIES:  No Known Allergies      MEDICATIONS:  STANDING MEDICATIONS  chlorhexidine 0.12% Liquid 15 milliLiter(s) Oral Mucosa every 12 hours  chlorhexidine 4% Liquid 1 Application(s) Topical <User Schedule>  dexAMETHasone     Tablet 4 milliGRAM(s) Oral every 6 hours  levETIRAcetam  IVPB 1500 milliGRAM(s) IV Intermittent every 12 hours  midazolam Infusion. 0.02 mG/kG/Hr IV Continuous <Continuous>  pantoprazole  Injectable 40 milliGRAM(s) IV Push daily    PRN MEDICATIONS        OBJECTIVE:  VITALS:   T(F): 96.6, Max: 97.9 (10-28-20)  HR: 68 (46 - 72)  BP: 131/78 (86/57 - 150/71)  RR: 18 (9 - 21)  SpO2: 100% (98% - 100%)    I&O's:   I&O's Summary    28 Oct 2020 07:  -  29 Oct 2020 07:00  --------------------------------------------------------  IN: 144.4 mL / OUT: 2845 mL / NET: -2700.6 mL    29 Oct 2020 07:  -  29 Oct 2020 19:55  --------------------------------------------------------  IN: 114.3 mL / OUT: 379 mL / NET: -264.7 mL        PHYSICAL EXAM:  General: No acute distress  HEENT: Normocephalic, atraumatic, PERRLA, No pallor, icterus, cyanosis; JVP- not elevated  PULM: b/l equal and clear breath sounds, no wheeze, crepitations  CVS: s1s2 normal, no murmurs, rubs or gallops  GI: Abdomen is soft, non-tender, no organomegaly, BS +  MSK: No joint/limb swelling, tenderness, deformity  SKIN: No rashes noted  NEURO: sedated, limited neuro exam    LABS:                        10.2   20.73 )-----------( 323      ( 29 Oct 2020 04:25 )             32.3             10-29    140  |  104  |  13  ----------------------------<  154<H>  3.8   |  25  |  0.8    Ca    7.7<L>      29 Oct 2020 04:25  Mg     2.3     10-29    TPro  5.3<L>  /  Alb  3.0<L>  /  TBili  0.4  /  DBili  x   /  AST  14  /  ALT  6   /  AlkPhos  72  10-29    LIVER FUNCTIONS - ( 29 Oct 2020 04:25 )  Alb: 3.0 g/dL / Pro: 5.3 g/dL / ALK PHOS: 72 U/L / ALT: 6 U/L / AST: 14 U/L / GGT: x                 PT/INR - ( 28 Oct 2020 07:05 )   PT: 11.50 sec;   INR: 1.00 ratio         PTT - ( 28 Oct 2020 07:05 )  PTT:22.0 sec  CARDIAC MARKERS ( 28 Oct 2020 07:05 )  x     / <0.01 ng/mL / x     / x     / x          ABG - ( 29 Oct 2020 03:00 )  pH, Arterial: 7.43  pH, Blood: x     /  pCO2: 42    /  pO2: 91    / HCO3: 28    / Base Excess: 3.2   /  SaO2: 99                Urinalysis Basic - ( 28 Oct 2020 11:05 )    Color: Colorless / Appearance: Clear / S.007 / pH: x  Gluc: x / Ketone: Negative  / Bili: Negative / Urobili: <2 mg/dL   Blood: x / Protein: Negative / Nitrite: Negative   Leuk Esterase: Negative / RBC: x / WBC x   Sq Epi: x / Non Sq Epi: x / Bacteria: x        Culture - Urine (collected 28 Oct 2020 11:05)  Source: .Urine Catheterized  Final Report (29 Oct 2020 13:27):    No growth    Culture - Blood (collected 28 Oct 2020 07:20)  Source: .Blood Blood-Peripheral  Preliminary Report (29 Oct 2020 13:02):    No growth to date.    Culture - Blood (collected 28 Oct 2020 07:20)  Source: .Blood Blood-Peripheral  Preliminary Report (29 Oct 2020 13:02):    No growth to date.      CAPILLARY BLOOD GLUCOSE      POCT Blood Glucose.: 147 mg/dL (29 Oct 2020 19:11)  POCT Blood Glucose.: 155 mg/dL (29 Oct 2020 13:50)  POCT Blood Glucose.: 163 mg/dL (29 Oct 2020 05:51)      Radiology:   < from: CT Head No Cont (10.29.20 @ 13:35) >  Stable postoperative change and left frontal encephalomalacia. Stable appearance of the focal hyperdensity  in the inferior left frontal lobe conforming to the shape of the gyrus. The lesion is located near the inferior margin of the resection bed seen on the prior MRI dated 2020, and could reflect a postoperative laminar calcification/necrosis, less likely hemorrhage. Further evaluation with MRI can be obtained as clinically indicated.    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    Stable mild right frontal hypodensity could reflect post treatment change versus edema in association with the enhancing lesion seen on the prior MRI from 2020. No significant mass effect.    < end of copied text >

## 2020-10-29 NOTE — CONSULT NOTE ADULT - ASSESSMENT
15. Impression:  79 yo male, Cantonese speaking, pmh Lung ca (dx 05/2018) with mets to brain s/p resection (09/2019; not on seizure ppx or steroids), radiation 2019, Chemo started 2019  complicated by one episode of unresponsives in 2020 when chemo was changed 04/2020( patient was unable to come to the hospital due to COVID) still on chemo last treatment (Oct 15th) ago, presents to ed with wife. As per ems pt was unresponsive and shaking in the right upper arm and right side of face for one hour. CT head showing R frontal brain mets w/ vasogenic edema and L sided postsurgical changes with possible intraparenchymal hemorrhage with or without underlying metastatic lesion. On examination, p/t s/p 6mg IV ativan, loaded w/ 1 g Keppra, not back to baseline. 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 Patient intubated and sedated.        16. Suggestions:  Can Add propofol to versed if has evidence of seizing.   Seizure precautions.   Keep magnesium >2.  Continue Keppra.   Continue Decadron  VEEG  Will need MRI brain w/wo gado if not contraindicated.  See neurosurgery recs for BP control.       17. Disposition:

## 2020-10-29 NOTE — PROGRESS NOTE ADULT - SUBJECTIVE AND OBJECTIVE BOX
Overnight events:  Patient remains intubated, off pressors, on versed.   Does not open eyes to verbal or noxious stimuli, grimaces to pain.         Vital Signs Last 24 Hrs  T(C): 35.6 (29 Oct 2020 12:00), Max: 36.6 (28 Oct 2020 23:15)  T(F): 96 (29 Oct 2020 12:00), Max: 97.9 (28 Oct 2020 23:15)  HR: 54 (29 Oct 2020 12:00) (46 - 70)  BP: 101/61 (29 Oct 2020 12:00) (86/57 - 150/71)  BP(mean): 74 (29 Oct 2020 12:00) (66 - 100)  RR: 14 (29 Oct 2020 12:00) (9 - 21)  SpO2: 100% (29 Oct 2020 12:00) (98% - 100%)    PHYSICAL EXAM:  Intubated, sedated  PERRLA, 2mm  Not following commands  withdraws extremities x4 to noxious stimuli  +LUE spontaneous nonpurposeful movements          MEDICATIONS:  Antibiotics:    Neuro:  levETIRAcetam  IVPB 1500 milliGRAM(s) IV Intermittent every 12 hours  midazolam Infusion. 0.02 mG/kG/Hr IV Continuous <Continuous>    Anticoagulation:    OTHER:  chlorhexidine 0.12% Liquid 15 milliLiter(s) Oral Mucosa every 12 hours  chlorhexidine 4% Liquid 1 Application(s) Topical <User Schedule>  dexAMETHasone     Tablet 4 milliGRAM(s) Oral every 6 hours  pantoprazole  Injectable 40 milliGRAM(s) IV Push daily    IVF:        LABS:                        10.2   20.73 )-----------( 323      ( 29 Oct 2020 04:25 )             32.3     10-29    140  |  104  |  13  ----------------------------<  154<H>  3.8   |  25  |  0.8    Ca    7.7<L>      29 Oct 2020 04:25  Mg     2.3     10-29    TPro  5.3<L>  /  Alb  3.0<L>  /  TBili  0.4  /  DBili  x   /  AST  14  /  ALT  6   /  AlkPhos  72  10    PT/INR - ( 28 Oct 2020 07:05 )   PT: 11.50 sec;   INR: 1.00 ratio         PTT - ( 28 Oct 2020 07:05 )  PTT:22.0 sec  Urinalysis Basic - ( 28 Oct 2020 11:05 )    Color: Colorless / Appearance: Clear / S.007 / pH: x  Gluc: x / Ketone: Negative  / Bili: Negative / Urobili: <2 mg/dL   Blood: x / Protein: Negative / Nitrite: Negative   Leuk Esterase: Negative / RBC: x / WBC x   Sq Epi: x / Non Sq Epi: x / Bacteria: x        RADIOLOGY:  c< from: CT Head No Cont (10.28.20 @ 07:42) >    EXAM:  CT BRAIN            PROCEDURE DATE:  10/28/2020            INTERPRETATION:  CLINICAL INDICATION: Seizure. Lung cancer with intracranial metastatic disease.    TECHNIQUE: CT of the head was performed without the administration of intravenous contrast.    COMPARISON: None.    CORRELATION:  MR HEAD 2020.    FINDINGS:    Patient is again noted to be status post left frontal craniotomy with gliosis/encephalomalacia in the subjacent left frontal lobe. In the inferior aspect of the resection cavity, there is a 1.2 x 0.7 x 1.2 cm round hyperdense lesion.    There is a small extra-axial fluid collection along the left frontal convexity subjacent to the craniotomy, outlined by hyperdense dura which may reflect dystrophic calcification.    No evidence of midline shift.    Focal area of vasogenic edema seen in the right frontal lobe corresponding to a known right frontal enhancing mass as seen on prior MRI dated 2020.    There is mild ventriculomegaly, slightly increased since the prior examination.    There is no evidence of acute territorial/transcortical infarction.    The visualized intraorbital contents are normal. The imaged portions mastoid air cells are aerated. Partially visualized parotid glands are normal.    There is partial opacification of the left frontal sinus through which there is a cranotomy defect that extends through both the inner and outer tables.      IMPRESSION:    Redemonstration of post surgical changes reflecting left frontal craniotomy with gliosis/encephalomalacia in the subjacent left frontal lobe. Small focal hyperdensity in the inferior left frontal lobe could reflect a small intraparenchymal hemorrhage with or without underlying metastatic lesion. Other considerations include laminar necrosis/calcification.    Small left frontal convexity extra-axial fluid collection subjacent to the craniotomy could reflect a postoperative subdural collection with mild dystrophic calcification of the dura.    Small right frontal edema likely corresponding to the known right frontal metastasis as seen on the prior MRI dated 2020.    Dr. Hannah Pierre discussed preliminary findings with JESSE JIMENEZ on 10/28/2020 8:43 AM with readback.            HANNAH PIERRE M.D., RESIDENT RADIOLOGIST  This document has been electronically signed.  MOOSE PYLE M.D., ATTENDING RADIOLOGIST  This document has been electronically signed. Oct 28 2020  9:36AM    < end of copied text >      Assessment/Plan:  No acute neurosurgical intervention at this time  f/u neurology recs  maintain tight B/P control <140  pt will need MRI brain w/wo gado if not contraindicated  d/w attg

## 2020-10-29 NOTE — CHART NOTE - NSCHARTNOTEFT_GEN_A_CORE
Upon Nutritional Assessment by the Registered Dietitian your patient was determined to meet criteria / has evidence of the following diagnosis/diagnoses:          [ ]  Mild Protein Calorie Malnutrition        [ ]  Moderate Protein Calorie Malnutrition        [ ] Severe Protein Calorie Malnutrition        [ ] Unspecified Protein Calorie Malnutrition        [ x] Underweight / BMI <19        [ ] Morbid Obesity / BMI > 40      Findings as based on:  •  Comprehensive nutrition assessment and consultation      Ht: 170.2cm  Wt: 53.5kg  BMI: 18.5      Treatment:    The following diet has been recommended:  (1) When medically feasible if pt to remain intubated, start tube feed with OSMOLITE 1.0 at 40cc/hr (or 240ml q6hr) with no-carb Prosource TF packet x4/day. This regimen gives a total of 1160 kcal/ 86g protein/ 806mL free water, additional flushes per ICU team.  Keep MAP>65.    (2) if pt is extubated, and passed bedside RN or official SLP evaluation for swallowing, start diet with a GOAL of REGULAR diet with Ensure Enlive q24hr.          PROVIDER Section:     By signing this assessment you are acknowledging and agree with the diagnosis/diagnoses assigned by the Registered Dietitian    Comments:

## 2020-10-29 NOTE — PROGRESS NOTE ADULT - SUBJECTIVE AND OBJECTIVE BOX
Patient is a 80y old  Male who presents with a chief complaint of Seizures and Altered  Mental Status (29 Oct 2020 00:43)        Over Night Events:  Off Pressors.  On Versed.          ROS:     All ROS are negative except HPI         PHYSICAL EXAM    ICU Vital Signs Last 24 Hrs  T(C): 36.3 (29 Oct 2020 04:00), Max: 36.6 (28 Oct 2020 23:15)  T(F): 97.3 (29 Oct 2020 04:00), Max: 97.9 (28 Oct 2020 23:15)  HR: 52 (29 Oct 2020 09:30) (46 - 76)  BP: 111/58 (29 Oct 2020 09:30) (86/57 - 150/71)  BP(mean): 79 (29 Oct 2020 09:30) (66 - 100)  ABP: --  ABP(mean): --  RR: 13 (29 Oct 2020 09:30) (9 - 18)  SpO2: 100% (29 Oct 2020 09:30) (98% - 100%)      CONSTITUTIONAL:  Ill appearing.  NAD    ENT:   Airway patent,   Mouth with normal mucosa.   No thrush    EYES:   Pupils equal,   Round and reactive to light.    CARDIAC:   Normal rate,   Regular rhythm.    No edema      Vascular:  Normal systolic impulse  No Carotid bruits    RESPIRATORY:   No wheezing  Bilateral BS  Normal chest expansion  Not tachypneic,  No use of accessory muscles    GASTROINTESTINAL:  Abdomen soft,   Non-tender,   No guarding,   + BS    MUSCULOSKELETAL:   Range of motion is not limited,  No clubbing, cyanosis    NEUROLOGICAL:   Sedated     SKIN:   Skin normal color for race,   Warm and dry   No evidence of rash.    PSYCHIATRIC:   Sedated   No apparent risk to self or others.    HEMATOLOGICAL:  No cervical  lymphadenopathy.  no inguinal lymphadenopathy      10-28-20 @ 07:01  -  10-29-20 @ 07:00  --------------------------------------------------------  IN:    FentaNYL: 2.7 mL    IV PiggyBack: 100 mL    Midazolam: 8.1 mL    Midazolam (Status Epilepticus): 6.6 mL    Norepinephrine: 27 mL  Total IN: 144.4 mL    OUT:    Indwelling Catheter - Urethral (mL): 2845 mL  Total OUT: 2845 mL    Total NET: -2700.6 mL      10-29-20 @ 07:01  -  10-29-20 @ 10:00  --------------------------------------------------------  IN:    Midazolam (Status Epilepticus): 3.3 mL  Total IN: 3.3 mL    OUT:    Indwelling Catheter - Urethral (mL): 80 mL    Norepinephrine: 0 mL  Total OUT: 80 mL    Total NET: -76.7 mL          LABS:                            10.2   20.73 )-----------( 323      ( 29 Oct 2020 04:25 )             32.3                                               10-29    140  |  104  |  13  ----------------------------<  154<H>  3.8   |  25  |  0.8    Ca    7.7<L>      29 Oct 2020 04:25  Mg     2.3     10-29    TPro  5.3<L>  /  Alb  3.0<L>  /  TBili  0.4  /  DBili  x   /  AST  14  /  ALT  6   /  AlkPhos  72  10-29      PT/INR - ( 28 Oct 2020 07:05 )   PT: 11.50 sec;   INR: 1.00 ratio         PTT - ( 28 Oct 2020 07:05 )  PTT:22.0 sec                                       Urinalysis Basic - ( 28 Oct 2020 11:05 )    Color: Colorless / Appearance: Clear / S.007 / pH: x  Gluc: x / Ketone: Negative  / Bili: Negative / Urobili: <2 mg/dL   Blood: x / Protein: Negative / Nitrite: Negative   Leuk Esterase: Negative / RBC: x / WBC x   Sq Epi: x / Non Sq Epi: x / Bacteria: x        CARDIAC MARKERS ( 28 Oct 2020 07:05 )  x     / <0.01 ng/mL / x     / x     / x                                                LIVER FUNCTIONS - ( 29 Oct 2020 04:25 )  Alb: 3.0 g/dL / Pro: 5.3 g/dL / ALK PHOS: 72 U/L / ALT: 6 U/L / AST: 14 U/L / GGT: x                                                                                               Mode: AC/ CMV (Assist Control/ Continuous Mandatory Ventilation)  RR (machine): 14  TV (machine): 400  FiO2: 40  PEEP: 5  ITime: 1  MAP: 8  PIP: 20                                      ABG - ( 29 Oct 2020 03:00 )  pH, Arterial: 7.43  pH, Blood: x     /  pCO2: 42    /  pO2: 91    / HCO3: 28    / Base Excess: 3.2   /  SaO2: 99                  MEDICATIONS  (STANDING):  chlorhexidine 0.12% Liquid 15 milliLiter(s) Oral Mucosa every 12 hours  chlorhexidine 4% Liquid 1 Application(s) Topical <User Schedule>  dexAMETHasone     Tablet 4 milliGRAM(s) Oral every 6 hours  levETIRAcetam  IVPB 1500 milliGRAM(s) IV Intermittent every 12 hours  midazolam Infusion. 0.02 mG/kG/Hr (1.07 mL/Hr) IV Continuous <Continuous>  norepinephrine Infusion 0.05 MICROgram(s)/kG/Min (5.02 mL/Hr) IV Continuous <Continuous>  pantoprazole  Injectable 40 milliGRAM(s) IV Push daily    MEDICATIONS  (PRN):      New X-rays reviewed:                                                                                  ECHO    CXR interpreted by me:  ET OG OK>  Left sided mass

## 2020-10-29 NOTE — PROGRESS NOTE ADULT - ASSESSMENT
IMPRESSION:    Status Epilepticus  S/P Intubation For Airway Protection  AMS- Likely Post-ictal  H/O Lung Cancer with Mets to brain s/p resection (2018) now on Chemo   Metastatic lesions of brain cannot R/O Intraparenchymal hemorrhage      PLAN:    CNS: Sedation with versed Until VEEG performed. Neurology and neurosurgery f/u. c/w Keppra. Decadron    HEENT: ET care ,oral care    PULMONARY:  HOB @ 45 degrees.  Vent changes as follows: Wean O2    CARDIOVASCULAR: i=O.  ECHO.      GI: GI prophylaxis.  OG feeds a tolerated    RENAL:  Follow up lytes.  Correct as needed    INFECTIOUS DISEASE: Follow up cultures; No ABX for now     HEMATOLOGICAL:  DVT prophylaxis     ENDOCRINE:  Follow up FS.  Insulin protocol if needed.    MUSCULOSKELETAL: Bedrest. FU LE duplex    Lines: TLC Left IJ    will need to get patient's records and home medication list.  Guarded Prognosis      Disposition: MICU monitoring

## 2020-10-29 NOTE — DIETITIAN INITIAL EVALUATION ADULT. - OTHER INFO
p/w Seizure and AMS. Hx of Lung cancer diagnosed 05/2018 with mets to Brain s/p resection. Still on Chemo now (last one was 10/15/2020). Found unresponsive with seizure like activity. Normally AOx4. CT head showed R frontal brain mets with edema. s/p intubation. AMS. VEEG. Neuro and NS to follow. Per Rounds 10/29 AM- d/c versed, assess neuro/EEG, repeat HCT before extubation.

## 2020-10-29 NOTE — DIETITIAN INITIAL EVALUATION ADULT. - OTHER CALCULATIONS
ABW 53.5kg --->   1205 kcal/day ([id number]);   PROTEIN: 70-86g/day (1.3-1.6 g/kg of ABW) -underwt noted;   FLUID: fluid per ICU team.

## 2020-10-30 NOTE — EEG REPORT - NS EEG TEXT BOX
Exam Performed on:  The digital V-EEG (video-encephalogram) was recorded using [employer] System.  The standard 10-20 System based on American clinical Neurophysiology guidelines was applied and the anterior temporal T1, T2 electrodes were added when needed.  The encephalography was acquired using a minimum number of 19 and 23 channels respectively for pediatrics and adults. Raw data was reviewed in detail.    History  80 year old man with brain metastasis and focal seizures    Diagnosis and purpose of monitoring:  Characterization and assessment    Medication    Medication	Dosage	Date	Comment  Levetiracetam	-	2020/10/30	-    Day #1: 10/29/2020 (10:07:20) to 10/30/2020 (08:34:43):       Awake:  The recording during the wakefulness consists of a symmetrical and well-organized background and posterior dominant rhythm in the range of 7-8 Hz, which is reactive to eye opening and eye closure and change in the level of alertness.      Asleep:  Different stages of sleep were presented well.  Stage II of non-REM sleep was characterized by the presence of symmetrical and well-defined sleep spindles and vertex sharp waves.  The deeper stages of sleep were identified by the presence of low theta and delta range activity seen diffusely over both hemispheres and more prominently from the frontal and central derivations.  All stages captured and symmetric.     Focal Slowing:  Moderate -severe left hemispheric slowing  Mild right hemispheric slowing      Generalized Slowing:  mild     Interictal Activity  No epileptiform activity    Activation and Provocation Procedures:  Not performed      Events:  No events captured      Impression  This is an abnormal Video EEG study with mild generalized background slowing and moderately severe left hemispheric and mild right hemisphere focal slowing, consistent with a structural lesion in that area. There is no epileptiform activity or clinical/subclinical seizure activity noted in the study.

## 2020-10-30 NOTE — PROGRESS NOTE ADULT - SUBJECTIVE AND OBJECTIVE BOX
Neurocritical Care Progress Note:    1. Brief Presentation:  As per ems pt was unresponsive and shaking in the right upper arm and right side of face for one hour pta.  2. Today's Acute Problems:  Status epilepticus, Intubation  3. Relevant brief History:  HPI:  79 yo male, Cantonese speaking, pmh Lung ca (dx 2018) with mets to brain s/p resection (2019; not on seizure ppx or steroids), radiation , Chemo started   complicated by one episode of unresponsives in  when chemo was changed 2020( patient was unable to come to the hospital due to COVID) still on chemo last treatment (Oct 15th) ago, presents to ed with wife. As per wife  patient was talking and  taking walking 30mins everday. Last known to be at baseline 10/27/2020.   As per ems pt was unresponsive and shaking in the right upper arm and right side of face for one hour pta.  In ED pt received 6mg IV ativan and loaded with 1G keppra, not back to baseline,  no tracking, not opening eyes to pain, no convulsions, withdrawing to pain.   As per wife at bedside ( ID [id number] & [id number]) patient woke up from bed at 5am this morning, found in bed, with both UE stiff and close to body. States that  had his eyes open but unresponsive. Reports that at baseline he was doing well, without any focal deficits, A&Ox4, ambulating well, sawing and building things. Wife reports no previous hx of seizure or CVA.    In ED s/p CT head showing R frontal brain mets w/ vasogenic edema and L sided postsurgical changes with possible intraparenchymal hemorrhage with or without underlying metastatic lesion. On examination, p/t s/p 6mg IV ativan, loaded w/ 1 g Keppra, not back to baseline. 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     Chemotherapy Medication   Abraxane  Paclitaxel (28 Oct 2020 12:45)    4-Yesterday's Plan:  NA  5. Last 24 hour updates:  NA  6. Medications:   chlorhexidine 0.12% Liquid 15 milliLiter(s) Oral Mucosa every 12 hours  chlorhexidine 4% Liquid 1 Application(s) Topical <User Schedule>  dexAMETHasone     Tablet 4 milliGRAM(s) Oral every 6 hours  levETIRAcetam  IVPB 1500 milliGRAM(s) IV Intermittent every 12 hours  midazolam Infusion 0.02 mG/kG/Hr IV Continuous <Continuous>  norepinephrine Infusion 0.05 MICROgram(s)/kG/Min IV Continuous <Continuous>      7. Ancillary Management:   Chest PT[ ]   Head of bed >35 [ x  Out of bed to chair [ ]   PT/OT/SP Eval [ ]   Spirometry[ ]   DVT prophalaxis[ x]    8.Neuro:   Awake: Spontaneously[ ] Occasionally[ ] To Voice [ ] To painful stimuli [ ]      [x] none  AIert [ ]. Following commands: 3 steps[ ], 2 steps[ ], 1 step [ ], None [x ]   Orientation: 0[x ], 1[ ], 2[ ], 3[ ]. Tracking objects with eyes: [ ]   Language: NA  Time off sedation for exam: NA  Pupils: Right   3>2   Left    3>2      Corneal:  +    Gag reflex:   +  EOMI: no gaze      mRS:5  0 No symptoms at all  1 No significant disability despite symptoms; able to carry out all usual duties and activities without assistance  2 Slight disability; unable to carry out all previous activities, but able to look after own affairs  3 Moderate disability; requiring some help, but able to walk without assistance  4 Moderately severe disability; unable to walk without assistance and unable to attend to own bodily needs without assistance  5 Severe disability; bedridden, incontinent and requiring constant nursing care and attention  6 Dead    ICHs:  Age >=80  GCS Score: 3-4 (2 pts)   GCS Score: 5-12 (1 pt)   ICH Volume: >30  (+) IVH  (+) Infratentorial    Giles&Salomon:  Grade I = Asymptomatic, mild headache, slight nuchal rigidity  Grade II = Moderate to severe headache, nuchal rigidity, no neurological deficit other than cranial nerve palsy  Grade III = Drowiness, confusion, mild focal neurological deficit  Grade IV = Stupor, moderate to severe hemiparesis  Grade V = Coma, decerebrate posturing    m-Otero:  Grade 0   (No Subarachnoid Hemorrhage (SAH)), No intraventricular hemorrhage (IVH), Incidence of symptomatic vasopasm 0%  Grade 1   (Focal or diffuse, thin SAH), No IVH, incidence of symptomatic vasospasm 24%  Grade 2   (Thin focal or diffuse SAH), IVH present, incidence of symptomatic vasospasm 33%  Grade 3   (Thick focal or diffuse SAH), No IVH, incidence of symptomatic vasospasm 33%  Grade 4   (Thick focal or diffuse SAH), IVH present, incidence of symptomatic vasosasm 40%    Last CTH:  CT Head No Cont:   EXAM:  CT BRAIN              IMPRESSION:    Redemonstration of post surgical changes reflecting left frontal craniotomy with gliosis/encephalomalacia in the subjacent left frontal lobe. Small focal hyperdensity in the inferior left frontal lobe could reflect a small intraparenchymal hemorrhage with or without underlying metastatic lesion. Other considerations include laminar necrosis/calcification.    Small left frontal convexity extra-axial fluid collection subjacent to the craniotomy could reflect a postoperative subdural collection with mild dystrophic calcification of the dura.    Small right frontal edema likely corresponding to the known right frontal metastasis as seen on the prior MRI dated 2020.    Last CTA/MRA:    Last CTP:    Last MRI:    Last TCD:    Last EEG:    EVD: [ ] Butler: [ ]     ICP:     CPP:     Level(cm):     24hr(ml):     CSF:     W:     R:     Cx:     P:     G:     LA:       Gram stain:    9. Cardiovascular:   Vital Signs Last 24 Hrs  T(C): 36.3 (28 Oct 2020 08:03), Max: 36.3 (28 Oct 2020 08:03)  T(F): 97.3 (28 Oct 2020 08:03), Max: 97.3 (28 Oct 2020 08:03)  HR: 63 (28 Oct 2020 22:39) (55 - 86)  BP: 108/60 (28 Oct 2020 22:39) (86/49 - 170/81)  BP(mean): 69 (28 Oct 2020 17:20) (69 - 93)  RR: 18 (28 Oct 2020 22:39) (16 - 22)  SpO2: 100% (28 Oct 2020 22:39) (99% - 100%)     Last Echo:    Last EKG:    CVP   MAP/CPP/SBP target:   CO:      CI:       Enzymes/Trop:    10. Respiratory:   ABG:ABG - ( 28 Oct 2020 16:29 )  pH, Arterial: 7.44  pH, Blood: x     /  pCO2: 42    /  pO2: 147   / HCO3: 28    / Base Excess: 3.6   /  SaO2: 99                  VBG:    Chest Xray:  < from: Xray Chest 1 View- PORTABLE-Urgent (Xray Chest 1 View- PORTABLE-Urgent .) (10.28.20 @ 11:00) >  Impression:    Unchanged left perihilar mass.    Tubes and lines positioned appropriately.      < end of copied text >    Mode: AC/ CMV (Assist Control/ Continuous Mandatory Ventilation)  RR (machine): 14  TV (machine): 400  FiO2: 30  PEEP: 5  ITime: 1  MAP: 9  PIP: 21      Peak Pressure/Cordell Pressure:    11.GI:    Prophalaxis:     Bowel mvt:     Abd distension:   LIVER FUNCTIONS - ( 28 Oct 2020 07:05 )  Alb: 3.2 g/dL / Pro: 5.7 g/dL / ALK PHOS: 78 U/L / ALT: 6 U/L / AST: 17 U/L / GGT: x             12.Renal/Fluids/Electrolytes:    10-28    141  |  102  |  11  ----------------------------<  177<H>  4.1   |  26  |  0.9    Ca    8.3<L>      28 Oct 2020 07:05  Mg     1.9     10-28    TPro  5.7<L>  /  Alb  3.2<L>  /  TBili  0.4  /  DBili  x   /  AST  17  /  ALT  6   /  AlkPhos  78  10-28      I&O's Detail    28 Oct 2020 07:01  -  29 Oct 2020 00:43  --------------------------------------------------------  IN:  Total IN: 0 mL    OUT:    Indwelling Catheter - Urethral (mL): 2500 mL  Total OUT: 2500 mL    Total NET: -2500 mL          13.ID:   TMax: 97.3  Vital Signs Last 24 Hrs  T(C): 36.3 (28 Oct 2020 08:03), Max: 36.3 (28 Oct 2020 08:03)  T(F): 97.3 (28 Oct 2020 08:03), Max: 97.3 (28 Oct 2020 08:03)  HR: 63 (28 Oct 2020 22:39) (55 - 86)  BP: 108/60 (28 Oct 2020 22:39) (86/49 - 170/81)  BP(mean): 69 (28 Oct 2020 17:20) (69 - 93)  RR: 18 (28 Oct 2020 22:39) (16 - 22)  SpO2: 100% (28 Oct 2020 22:39) (99% - 100%)      Urinalysis Basic - ( 28 Oct 2020 11:05 )    Color: Colorless / Appearance: Clear / S.007 / pH: x  Gluc: x / Ketone: Negative  / Bili: Negative / Urobili: <2 mg/dL   Blood: x / Protein: Negative / Nitrite: Negative   Leuk Esterase: Negative / RBC: x / WBC x   Sq Epi: x / Non Sq Epi: x / Bacteria: x         Lines: Central[] Date inserted: Peripheral[]    14. Hematology:                         10.4   11.52 )-----------( 362      ( 28 Oct 2020 07:10 )             33.9      10    141  |  102  |  11  ----------------------------<  177<H>  4.1   |  26  |  0.9    Ca    8.3<L>      28 Oct 2020 07:05  Mg     1.9     10    TPro  5.7<L>  /  Alb  3.2<L>  /  TBili  0.4  /  DBili  x   /  AST  17  /  ALT  6   /  AlkPhos  78  10     PT/INR - ( 28 Oct 2020 07:05 )   PT: 11.50 sec;   INR: 1.00 ratio         PTT - ( 28 Oct 2020 07:05 )  PTT:22.0 sec    DVT Prophylaxis Lovenox[ ] Heparin[ ] Venodynes[ ] SCD's[x ]

## 2020-10-30 NOTE — PROGRESS NOTE ADULT - SUBJECTIVE AND OBJECTIVE BOX
SUBJECTIVE:   LENGTH OF HOSPITAL STAY: 2d    CHIEF COMPLAINT:  Patient is a 80y old  Male who presents with a chief complaint of Seizures and Altered  Mental Status (30 Oct 2020 09:19)      Events over the past 24 hours:  Patient was seen and examined in the bedside this morning. The Intubated. spontaneous eye opening, moves all 4 extremities.     REVIEW OF SYSTEMS  Negative Except as mentioned above.    ALLERGIES:  No Known Allergies      MEDICATIONS:  STANDING MEDICATIONS  chlorhexidine 0.12% Liquid 15 milliLiter(s) Oral Mucosa every 12 hours  chlorhexidine 4% Liquid 1 Application(s) Topical <User Schedule>  dexAMETHasone     Tablet 4 milliGRAM(s) Oral every 6 hours  levETIRAcetam  IVPB 1500 milliGRAM(s) IV Intermittent every 12 hours  pantoprazole  Injectable 40 milliGRAM(s) IV Push daily    PRN MEDICATIONS        OBJECTIVE:  VITALS:   T(F): 96.5, Max: 96.9 (10-30-20)  HR: 58 (48 - 78)  BP: 119/55 (87/55 - 131/78)  RR: 13 (11 - 22)  SpO2: 100% (99% - 100%)    I&O's:   I&O's Summary    29 Oct 2020 07:01  -  30 Oct 2020 07:00  --------------------------------------------------------  IN: 426.4 mL / OUT: 684 mL / NET: -257.6 mL    30 Oct 2020 07:01  -  30 Oct 2020 13:16  --------------------------------------------------------  IN: 163.7 mL / OUT: 55 mL / NET: 108.7 mL        PHYSICAL EXAM:  General: No acute distress  HEENT: Intubated, Normocephalic, atraumatic, PERRLA, EOMI - Normal, No pallor, icterus, cyanosis; JVP- not elevated  PULM: b/l equal and clear breath sounds, no wheeze, crepitations  CVS: s1s2 normal, no murmurs, rubs or gallops  GI: Abdomen is soft, non-tender, no organomegaly, BS +  MSK: No joint/limb swelling, tenderness, deformity  SKIN: No rashes noted  NEURO: alert, spontaneous eye opening +, no focal neurological deficits noted    LABS:                        10.0   21.88 )-----------( 307      ( 30 Oct 2020 04:45 )             32.8             10-30    140  |  103  |  21<H>  ----------------------------<  141<H>  3.9   |  26  |  0.8    Ca    7.9<L>      30 Oct 2020 04:45  Mg     2.5     10-30    TPro  5.6<L>  /  Alb  3.1<L>  /  TBili  0.3  /  DBili  x   /  AST  11  /  ALT  6   /  AlkPhos  71  10-30    LIVER FUNCTIONS - ( 30 Oct 2020 04:45 )  Alb: 3.1 g/dL / Pro: 5.6 g/dL / ALK PHOS: 71 U/L / ALT: 6 U/L / AST: 11 U/L / GGT: x                       ABG - ( 30 Oct 2020 12:25 )  pH, Arterial: 7.46  pH, Blood: x     /  pCO2: 41    /  pO2: 141   / HCO3: 29    / Base Excess: 4.9   /  SaO2: 100                   Culture - Urine (collected 28 Oct 2020 11:05)  Source: .Urine Catheterized  Final Report (29 Oct 2020 13:27):    No growth    Culture - Blood (collected 28 Oct 2020 07:20)  Source: .Blood Blood-Peripheral  Preliminary Report (29 Oct 2020 13:02):    No growth to date.    Culture - Blood (collected 28 Oct 2020 07:20)  Source: .Blood Blood-Peripheral  Preliminary Report (29 Oct 2020 13:02):    No growth to date.      CAPILLARY BLOOD GLUCOSE      POCT Blood Glucose.: 159 mg/dL (30 Oct 2020 05:45)  POCT Blood Glucose.: 156 mg/dL (30 Oct 2020 00:14)  POCT Blood Glucose.: 147 mg/dL (29 Oct 2020 19:11)  POCT Blood Glucose.: 155 mg/dL (29 Oct 2020 13:50)        RADIOLOGY & ADDITIONAL TESTS:  Xray Chest 1 View AP/PA:   EXAM:  XR CHEST 1 VIEW            PROCEDURE DATE:  10/30/2020            INTERPRETATION:  Clinical History / Reason for exam: Respiratory distress    Comparison : Chest radiograph 10/29/2020.    Technique/Positioning: Frontal.    Findings:    Support devices: ET tube mid trachea central venous line superior vena cava NG tube in stomach    Cardiac/mediastinum/hilum: Unremarkable.    Lung parenchyma/Pleura: Left perihilar mass unchanged. No additional parenchymal opacity pleural effusion or air leak    Skeleton/soft tissues: Unremarkable.    Impression:    Left perihilar mass unchanged. No additional parenchymal opacity pleural effusion or air leak                    ANTHONY RENDON MD; Attending Radiologist  This document has been electronically signed. Oct 30 2020 11:07AM (10-30-20 @ 10:04)

## 2020-10-30 NOTE — PROGRESS NOTE ADULT - ASSESSMENT
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 c/w Keppra. Decadron    HEENT: ET care ,oral care    PULMONARY:  HOB @ 45 degrees.  Vent changes as follows:  None     CARDIOVASCULAR: I=O.      GI: GI prophylaxis.  OG feeds a tolerated    RENAL:  Follow up lytes.  Correct as needed    INFECTIOUS DISEASE: Follow up cultures; No ABX for now     HEMATOLOGICAL:  DVT prophylaxis     ENDOCRINE:  Follow up FS.  Insulin protocol if needed.    MUSCULOSKELETAL: Bedrest. FU LE duplex    Lines: TLC Left IJ    Disposition: MICU monitoring         IMPRESSION:    Status Epilepticus  S/P Intubation For Airway Protection  AMS- Likely Post-ictal improving  H/O Lung Cancer with Mets to brain s/p resection (2018) now on Chemo   Metastatic lesions of brain cannot R/O Intraparenchymal hemorrhage      PLAN:    CNS: Hold sedation if VEEG negative. c/w Keppra. Decadron    HEENT: ET care ,oral care    PULMONARY:  HOB @ 45 degrees.  Vent changes as follows:  None.  CXR     CARDIOVASCULAR: I=O.      GI: GI prophylaxis.  OG feeds a tolerated    RENAL:  Follow up lytes.  Correct as needed    INFECTIOUS DISEASE: Follow up cultures; No ABX for now     HEMATOLOGICAL:  DVT prophylaxis     ENDOCRINE:  Follow up FS.  Insulin protocol if needed.    MUSCULOSKELETAL: Bedrest. FU LE duplex    Lines: TLC Left IJ    Disposition: MICU monitoring

## 2020-10-30 NOTE — PROGRESS NOTE ADULT - ASSESSMENT
15. Impression:  79 yo male, Cantonese speaking, pmh Lung ca (dx 05/2018) with mets to brain s/p resection (09/2019; not on seizure ppx or steroids), radiation 2019, Chemo started 2019  complicated by one episode of unresponsives in 2020 when chemo was changed 04/2020( patient was unable to come to the hospital due to COVID) still on chemo last treatment (Oct 15th) ago, presents to ed with wife. As per ems pt was unresponsive and shaking in the right upper arm and right side of face for one hour. CT head showing R frontal brain mets w/ vasogenic edema and L sided postsurgical changes with possible intraparenchymal hemorrhage with or without underlying metastatic lesion. On examination, p/t s/p 6mg IV ativan, loaded w/ 1 g Keppra, not back to baseline. 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 Patient intubated and sedated.        16. Suggestions:    Seizure precautions.   Keep magnesium >2.  Continue Keppra.   Continue Decadron  f/u vEEG  Will need MRI brain w/wo gado if not contraindicated.        17. Disposition:

## 2020-10-30 NOTE — PROGRESS NOTE ADULT - ASSESSMENT
79 yo male, Cantonese speaking, pmh Lung ca (dx 05/2018) with mets to brain s/p resection (09/2019; not on seizure ppx or steroids), s/p radiation 2019, on Chemo since 2019 complicated by one episode of unresponsives in 2020 when chemo was changed 04/2020 presenting to ED after being found unresponsive in the bed with both UE stiff and close to body. Had 1 hour of seizure like activity with shaking of right upper extremity and right side of face per EMT. In ED s/p CT head showing R frontal brain mets w/ vasogenic edema and L sided postsurgical changes with possible intraparenchymal hemorrhage with or without underlying metastatic lesion. Intubated for airway protection.    # Status Epilepticus  # AMS- Likely Post-ictal  # S/P Intubation For Airway Protection  - VEEG off versed, Neurology and neurosurgery f/u.   - c/w Keppra. Decadron    # H/O Lung Cancer with Mets to brain   - s/p resection (2018)   - now on Chemo   - Follow up with oncologist    # Metastatic lesions of brain cannot R/O Intraparenchymal hemorrhage  - Neurosurgery follow up  - MRI brain w/wo gado once stable    IV Line: TLC L IJ  DVT PPX: SCD  Activity : Bedrest  Diet: OG feeds  Disposition: MICU monitoring 81 yo male, Cantonese speaking, pmh Lung ca (dx 05/2018) with mets to brain s/p resection (09/2019; not on seizure ppx or steroids), s/p radiation 2019, on Chemo since 2019 complicated by one episode of unresponsives in 2020 when chemo was changed 04/2020 presenting to ED after being found unresponsive in the bed with both UE stiff and close to body. Had 1 hour of seizure like activity with shaking of right upper extremity and right side of face per EMT. In ED s/p CT head showing R frontal brain mets w/ vasogenic edema and L sided postsurgical changes with possible intraparenchymal hemorrhage with or without underlying metastatic lesion. Intubated for airway protection.    # Status Epilepticus  # AMS- Likely Post-ictal  # S/P Intubation For Airway Protection  - VEEG off versed, Neurology and neurosurgery f/u.   - c/w Keppra. Decadron    # H/O Lung Cancer with Mets to brain   - s/p resection (2018)   - now on Chemo   - Follow up with oncologist (EDUARDO VICTOR MD – [facility]- [phone])    # Metastatic lesions of brain cannot R/O Intraparenchymal hemorrhage  - Neurosurgery follow up  - MRI brain w/wo gado once stable    IV Line: TLC L IJ  DVT PPX: SCD  Activity : Bedrest  Diet: OG feeds  Disposition: MICU monitoring 81 yo male, Cantonese speaking, pmh Lung ca (dx 05/2018) with mets to brain s/p resection (09/2019; not on seizure ppx or steroids), s/p radiation 2019, on Chemo since 2019 complicated by one episode of unresponsives in 2020 when chemo was changed 04/2020 presenting to ED after being found unresponsive in the bed with both UE stiff and close to body. Had 1 hour of seizure like activity with shaking of right upper extremity and right side of face per EMT. In ED s/p CT head showing R frontal brain mets w/ vasogenic edema and L sided postsurgical changes with possible intraparenchymal hemorrhage with or without underlying metastatic lesion. Intubated for airway protection. PCP - Jacki Verma ([phone])    # Status Epilepticus  # AMS- Likely Post-ictal  # S/P Intubation For Airway Protection  - VEEG off versed, Neurology and neurosurgery f/u.   - c/w Keppra. Decadron    # H/O Lung Cancer with Mets to brain   - s/p resection (2018)   - now on Chemo   - Follow up with oncologist (EDUARDO VICTOR MD – [facility]- [phone])    # Metastatic lesions of brain cannot R/O Intraparenchymal hemorrhage  - Neurosurgery follow up  - MRI brain w/wo gado once stable    IV Line: TLC L IJ  DVT PPX: SCD  Activity : Bedrest  Diet: OG feeds  Disposition: MICU monitoring 79 yo male, Cantonese speaking, pmh Lung ca (dx 05/2018) with mets to brain s/p resection (09/2019; not on seizure ppx or steroids), s/p radiation 2019, on Chemo since 2019 complicated by one episode of unresponsives in 2020 when chemo was changed 04/2020 presenting to ED after being found unresponsive in the bed with both UE stiff and close to body. Had 1 hour of seizure like activity with shaking of right upper extremity and right side of face per EMT. In ED s/p CT head showing R frontal brain mets w/ vasogenic edema and L sided postsurgical changes with possible intraparenchymal hemorrhage with or without underlying metastatic lesion. Intubated for airway protection. PCP - Jacki Verma ([phone])    # Status Epilepticus  # AMS- Likely Post-ictal  # S/P Intubation For Airway Protection  - VEEG off versed, Neurology and neurosurgery f/u.   - c/w Keppra. Decadron  - CPAP with possible extubation today    # H/O Lung Cancer with Mets to brain   - s/p resection (2018)   - now on Chemo   - Follow up with oncologist (EDUARDO VICTOR MD – [facility]- [phone])    # Metastatic lesions of brain cannot R/O Intraparenchymal hemorrhage  - Neurosurgery follow up  - MRI brain w/wo gado once stable    IV Line: TLC L IJ  DVT PPX: SCD  Activity : Bedrest  Diet: OG feeds  Disposition: MICU monitoring 79 yo male, Cantonese speaking, pmh Lung ca (dx 05/2018) with mets to brain s/p resection (09/2019; not on seizure ppx or steroids), s/p radiation 2019, on Chemo since 2019 complicated by one episode of unresponsives in 2020 when chemo was changed 04/2020 presenting to ED after being found unresponsive in the bed with both UE stiff and close to body. Had 1 hour of seizure like activity with shaking of right upper extremity and right side of face per EMT. In ED s/p CT head showing R frontal brain mets w/ vasogenic edema and L sided postsurgical changes with possible intraparenchymal hemorrhage with or without underlying metastatic lesion. Intubated for airway protection. PCP - Jacki Verma ([phone])    # Status Epilepticus  # AMS- Likely Post-ictal  # S/P Intubation For Airway Protection  - VEEG off versed, Neurology and neurosurgery f/u.   - c/w Keppra. Decadron  - CPAP with possible extubation today    # H/O Lung Cancer with Mets to brain   - s/p resection (2018)   - now on Chemo   - Follow up with oncologist (EDUARDO VICTOR MD – [facility]- [phone])    # Metastatic lesions of brain cannot R/O Intraparenchymal hemorrhage  - Neurosurgery follow up  - MRI brain w/wo gado once stable    # DC you  # IV Line: TLC L IJ  # DVT PPX: SCD  # Activity : Bedrest  # Diet: OG feeds  # Disposition: MICU monitoring

## 2020-10-30 NOTE — PROGRESS NOTE ADULT - SUBJECTIVE AND OBJECTIVE BOX
Patient is a 80y old  Male who presents with a chief complaint of Seizures and Altered  Mental Status (30 Oct 2020 05:11)        Over Night Events:  Remains on MV.  Off pressors.  Improved MS.  On VEEG.          ROS:     All ROS are negative except HPI         PHYSICAL EXAM    ICU Vital Signs Last 24 Hrs  T(C): 36.1 (30 Oct 2020 06:00), Max: 36.1 (30 Oct 2020 06:00)  T(F): 96.9 (30 Oct 2020 06:00), Max: 96.9 (30 Oct 2020 06:00)  HR: 54 (30 Oct 2020 08:30) (48 - 78)  BP: 100/56 (30 Oct 2020 08:30) (87/55 - 131/78)  BP(mean): 74 (30 Oct 2020 08:30) (67 - 101)  ABP: --  ABP(mean): --  RR: 12 (30 Oct 2020 08:30) (11 - 22)  SpO2: 100% (30 Oct 2020 08:30) (99% - 100%)      CONSTITUTIONAL:  Ill appearing  NAD    ENT:   Airway patent,   Mouth with normal mucosa.   No thrush    EYES:   Pupils equal,   Round and reactive to light.    CARDIAC:   Normal rate,   Regular rhythm.    No edema      Vascular:  Normal systolic impulse  No Carotid bruits    RESPIRATORY:   No wheezing  Bilateral BS  Normal chest expansion  Not tachypneic,  No use of accessory muscles    GASTROINTESTINAL:  Abdomen soft,   Non-tender,   No guarding,   + BS    MUSCULOSKELETAL:   Range of motion is not limited,  No clubbing, cyanosis    NEUROLOGICAL:   Sedated   No motor  deficits.    SKIN:   Skin normal color for race,   Warm and dry   No evidence of rash.    PSYCHIATRIC:   Sedated   No apparent risk to self or others.    HEMATOLOGICAL:  No cervical  lymphadenopathy.  no inguinal lymphadenopathy      10-29-20 @ 07:01  -  10-30-20 @ 07:00  --------------------------------------------------------  IN:    Enteral Tube Flush: 120 mL    IV PiggyBack: 200 mL    Midazolam (Status Epilepticus): 4.4 mL    Midazolam (Status Epilepticus): 22 mL    Osmolite: 80 mL  Total IN: 426.4 mL    OUT:    Indwelling Catheter - Urethral (mL): 684 mL    Norepinephrine: 0 mL  Total OUT: 684 mL    Total NET: -257.6 mL      10-30-20 @ 07:01  -  10-30-20 @ 09:20  --------------------------------------------------------  IN:    Midazolam (Status Epilepticus): 2.2 mL    Osmolite: 60 mL  Total IN: 62.2 mL    OUT:    Indwelling Catheter - Urethral (mL): 55 mL  Total OUT: 55 mL    Total NET: 7.2 mL          LABS:                            10.0   21.88 )-----------( 307      ( 30 Oct 2020 04:45 )             32.8                                               1030             10.0   21.88 )-----------( 307      ( 10-30 @ 04:45 )             32.8                10.2   20.73 )-----------( 323      ( 10-29 @ 04:25 )             32.3                10.4   11.52 )-----------( 362      ( 10-28 @ 07:10 )             33.9           140  |  103  |  21<H>  ----------------------------<  141<H>  3.9   |  26  |  0.8    Ca    7.9<L>      30 Oct 2020 04:45  Mg     2.5     10-30    TPro  5.6<L>  /  Alb  3.1<L>  /  TBili  0.3  /  DBili  x   /  AST  11  /  ALT  6   /  AlkPhos  71  10-30                                             Urinalysis Basic - ( 28 Oct 2020 11:05 )    Color: Colorless / Appearance: Clear / S.007 / pH: x  Gluc: x / Ketone: Negative  / Bili: Negative / Urobili: <2 mg/dL   Blood: x / Protein: Negative / Nitrite: Negative   Leuk Esterase: Negative / RBC: x / WBC x   Sq Epi: x / Non Sq Epi: x / Bacteria: x                                                  LIVER FUNCTIONS - ( 30 Oct 2020 04:45 )  Alb: 3.1 g/dL / Pro: 5.6 g/dL / ALK PHOS: 71 U/L / ALT: 6 U/L / AST: 11 U/L / GGT: x                                                  Culture - Urine (collected 28 Oct 2020 11:05)  Source: .Urine Catheterized  Final Report (29 Oct 2020 13:27):    No growth    Culture - Blood (collected 28 Oct 2020 07:20)  Source: .Blood Blood-Peripheral  Preliminary Report (29 Oct 2020 13:02):    No growth to date.    Culture - Blood (collected 28 Oct 2020 07:20)  Source: .Blood Blood-Peripheral  Preliminary Report (29 Oct 2020 13:02):    No growth to date.                                                   Mode: AC/ CMV (Assist Control/ Continuous Mandatory Ventilation)  RR (machine): 14  TV (machine): 400  FiO2: 30  PEEP: 5  ITime: 1  MAP: 9  PIP: 23                                      ABG - ( 30 Oct 2020 04:13 )  pH, Arterial: 7.45  pH, Blood: x     /  pCO2: 39    /  pO2: 127   / HCO3: 27    / Base Excess: 2.9   /  SaO2: 99    Lac 3.0               MEDICATIONS  (STANDING):  chlorhexidine 0.12% Liquid 15 milliLiter(s) Oral Mucosa every 12 hours  chlorhexidine 4% Liquid 1 Application(s) Topical <User Schedule>  dexAMETHasone     Tablet 4 milliGRAM(s) Oral every 6 hours  levETIRAcetam  IVPB 1500 milliGRAM(s) IV Intermittent every 12 hours  midazolam Infusion. 0.02 mG/kG/Hr (1.07 mL/Hr) IV Continuous <Continuous>  pantoprazole  Injectable 40 milliGRAM(s) IV Push daily    MEDICATIONS  (PRN):      New X-rays reviewed:                                                                                  ECHO    CXR interpreted by me:  Not done

## 2020-10-31 NOTE — CHART NOTE - NSCHARTNOTEFT_GEN_A_CORE
Transfer Note    Transfer from: MICU    Transfer to: ( x ) Medicine    (  ) Telemetry    (  ) RCU      (  ) Palliative    (  ) Stroke Unit    (  ) MICU    (  ) __________________    HPI / CCU COURSE:  81 yo male, Cantonese speaking, pmh lung CA (dx 05/2018) with mets to brain s/p resection (09/2019; not on seizure ppx or steroids), s/p radiation 2019, on chemo since 2019 complicated by one episode of unresponsives in 2020 when chemo was changed 04/2020 presenting to ED after being found unresponsive in bed with both UE stiff and close to body. Had 1 hour of seizure like activity with shaking of right upper extremity and right side of face per EMT. In ED, pt. received 6mg IV ativan and loaded with 1G keppra, not back to baseline, no tracking, was not opening eyes to pain, no convulsions, withdrawing to pain.   CT head showed R frontal brain mets w/ vasogenic edema and L sided postsurgical changes with possible intraparenchymal hemorrhage with or without underlying metastatic lesion. Presentation was concerning for possible non-convulsive status epilepticus. Intubated for airway protection.    In MICU, pt. had EEG with focal slowing but no epileptiform discharges, no seizures. CTH on 10/29 showed stable hemorrhagic region and edema. Pt. was extubated yesterday. Per neuro. recommendations, continue high dose steroids with taper & keppra indefinitely for prophylaxis.      Vital Signs Last 24 Hrs  T(C): 36.3 (31 Oct 2020 09:00), Max: 36.4 (31 Oct 2020 04:00)  T(F): 97.3 (31 Oct 2020 09:00), Max: 97.5 (31 Oct 2020 04:00)  HR: 92 (31 Oct 2020 12:00) (50 - 92)  BP: 156/75 (31 Oct 2020 12:00) (115/63 - 158/71)  BP(mean): 110 (31 Oct 2020 08:00) (77 - 114)  RR: 19 (31 Oct 2020 12:00) (10 - 25)  SpO2: 99% (31 Oct 2020 12:00) (95% - 100%)    I&O's Summary    30 Oct 2020 07:01  -  31 Oct 2020 07:00  --------------------------------------------------------  IN: 263.7 mL / OUT: 941 mL / NET: -677.3 mL        Physical Exam:       LABS:                           10.0   21.88 )-----------( 307      ( 30 Oct 2020 04:45 )             32.8       10-30    140  |  103  |  21<H>  ----------------------------<  141<H>  3.9   |  26  |  0.8    Ca    7.9<L>      30 Oct 2020 04:45  Mg     2.5     10-30    TPro  5.6<L>  /  Alb  3.1<L>  /  TBili  0.3  /  DBili  x   /  AST  11  /  ALT  6   /  AlkPhos  71  10-30          ABG - ( 30 Oct 2020 16:20 )  pH, Arterial: 7.46  pH, Blood: x     /  pCO2: 42    /  pO2: 81    / HCO3: 30    / Base Excess: 5.4   /  SaO2: 97          ASSESSMENT & PLAN:     # Status Epilepticus  # AMS- Likely Post-ictal  # S/P Intubation For Airway Protection  - VEEG off versed, neurology and neurosurgery f/u.   - c/w Keppra. Decadron  - Extubated yesterday    # H/O Lung Cancer with Mets to brain   - s/p resection (2018)   - Now on Chemo   - Follow up with oncologist (EDUARDO VICTOR MD – [facility]- [phone])    # Metastatic lesions of brain cannot R/O Intraparenchymal hemorrhage  - Neurosurgery follow up  - MRI brain w/wo gado once stable    # DC you  # IV Line: TLC L IJ  # DVT PPX: SCD  # Activity : Bedrest      FOR FOLLOW UP:  [ ] MRI with & without contrast  [ ] Neuro. checks q8h.  [ ]   [ ]   [ ]   [ ] Transfer Note    Transfer from: MICU    Transfer to: ( x ) Medicine    (  ) Telemetry    (  ) RCU      (  ) Palliative    (  ) Stroke Unit    (  ) MICU    (  ) __________________    HPI / CCU COURSE:  79 yo male, Cantonese speaking, pmh lung CA (dx 05/2018) with mets to brain s/p resection (09/2019; not on seizure ppx or steroids), s/p radiation 2019, on chemo since 2019 complicated by one episode of unresponsives in 2020 when chemo was changed 04/2020 presenting to ED after being found unresponsive in bed with both UE stiff and close to body. Had 1 hour of seizure like activity with shaking of right upper extremity and right side of face per EMT. In ED, pt. received 6mg IV ativan and loaded with 1G keppra, not back to baseline, no tracking, was not opening eyes to pain, no convulsions, withdrawing to pain.   CT head showed R frontal brain mets w/ vasogenic edema and L sided postsurgical changes with possible intraparenchymal hemorrhage with or without underlying metastatic lesion. Presentation was concerning for possible non-convulsive status epilepticus. Intubated for airway protection.    In MICU, pt. had EEG with focal slowing but no epileptiform discharges, no seizures. CTH on 10/29 showed stable hemorrhagic region and edema. Pt. was extubated yesterday. Off of versed infusion. Per neuro. recommendations, continue high dose steroids with taper & keppra indefinitely for prophylaxis.      Vital Signs Last 24 Hrs  T(C): 36.3 (31 Oct 2020 09:00), Max: 36.4 (31 Oct 2020 04:00)  T(F): 97.3 (31 Oct 2020 09:00), Max: 97.5 (31 Oct 2020 04:00)  HR: 92 (31 Oct 2020 12:00) (50 - 92)  BP: 156/75 (31 Oct 2020 12:00) (115/63 - 158/71)  BP(mean): 110 (31 Oct 2020 08:00) (77 - 114)  RR: 19 (31 Oct 2020 12:00) (10 - 25)  SpO2: 99% (31 Oct 2020 12:00) (95% - 100%)    I&O's Summary    30 Oct 2020 07:01  -  31 Oct 2020 07:00  --------------------------------------------------------  IN: 263.7 mL / OUT: 941 mL / NET: -677.3 mL        Physical Exam:       LABS:                           10.0   21.88 )-----------( 307      ( 30 Oct 2020 04:45 )             32.8       10-30    140  |  103  |  21<H>  ----------------------------<  141<H>  3.9   |  26  |  0.8    Ca    7.9<L>      30 Oct 2020 04:45  Mg     2.5     10-30    TPro  5.6<L>  /  Alb  3.1<L>  /  TBili  0.3  /  DBili  x   /  AST  11  /  ALT  6   /  AlkPhos  71  10-30          ABG - ( 30 Oct 2020 16:20 )  pH, Arterial: 7.46  pH, Blood: x     /  pCO2: 42    /  pO2: 81    / HCO3: 30    / Base Excess: 5.4   /  SaO2: 97          ASSESSMENT & PLAN:     # Status Epilepticus  # AMS- Likely Post-ictal  # S/P Intubation For Airway Protection  - VEEG off versed, neurology and neurosurgery f/u.   - c/w Keppra. Decadron  - Extubated yesterday    # H/O Lung Cancer with Mets to brain   - s/p resection (2018)   - Now on Chemo   - Follow up with oncologist (EDUARDO VICTOR MD – [facility]- [phone])    # Metastatic lesions of brain cannot R/O Intraparenchymal hemorrhage  - Neurosurgery follow up  - MRI brain w/wo gado once stable    # DC you  # IV Line: TLC L IJ  # DVT PPX: SCD  # Activity : Bedrest      FOR FOLLOW UP:  [ ] MRI with & without contrast  [ ] Neuro. checks q8h.  [ ]   [ ]   [ ]   [ ] Transfer Note    Transfer from: MICU    Transfer to: ( x ) Medicine    (  ) Telemetry    (  ) RCU      (  ) Palliative    (  ) Stroke Unit    (  ) MICU    (  ) __________________    HPI / CCU COURSE:  79 yo male, Cantonese speaking, pmh lung CA (dx 05/2018) with mets to brain s/p resection (09/2019; not on seizure ppx or steroids), s/p radiation 2019, on chemo since 2019 complicated by one episode of unresponsives in 2020 when chemo was changed 04/2020 presenting to ED after being found unresponsive in bed with both UE stiff and close to body. Had 1 hour of seizure like activity with shaking of right upper extremity and right side of face per EMT. In ED, pt. received 6mg IV ativan and loaded with 1G keppra, not back to baseline, no tracking, was not opening eyes to pain, no convulsions, withdrawing to pain.   CT head showed R frontal brain mets w/ vasogenic edema and L sided postsurgical changes with possible intraparenchymal hemorrhage with or without underlying metastatic lesion. Presentation was concerning for possible non-convulsive status epilepticus. Intubated for airway protection.    In MICU, pt. had EEG with focal slowing but no epileptiform discharges, no seizures. CTH on 10/29 showed stable hemorrhagic region and edema. Pt. was extubated yesterday. Off of versed infusion. Per neuro. recommendations, continue high dose steroids with taper & keppra indefinitely for prophylaxis.      Vital Signs Last 24 Hrs  T(C): 36.3 (31 Oct 2020 09:00), Max: 36.4 (31 Oct 2020 04:00)  T(F): 97.3 (31 Oct 2020 09:00), Max: 97.5 (31 Oct 2020 04:00)  HR: 92 (31 Oct 2020 12:00) (50 - 92)  BP: 156/75 (31 Oct 2020 12:00) (115/63 - 158/71)  BP(mean): 110 (31 Oct 2020 08:00) (77 - 114)  RR: 19 (31 Oct 2020 12:00) (10 - 25)  SpO2: 99% (31 Oct 2020 12:00) (95% - 100%)    I&O's Summary    30 Oct 2020 07:01  -  31 Oct 2020 07:00  --------------------------------------------------------  IN: 263.7 mL / OUT: 941 mL / NET: -677.3 mL        Physical Exam:       LABS:                           10.0   21.88 )-----------( 307      ( 30 Oct 2020 04:45 )             32.8       10-30    140  |  103  |  21<H>  ----------------------------<  141<H>  3.9   |  26  |  0.8    Ca    7.9<L>      30 Oct 2020 04:45  Mg     2.5     10-30    TPro  5.6<L>  /  Alb  3.1<L>  /  TBili  0.3  /  DBili  x   /  AST  11  /  ALT  6   /  AlkPhos  71  10-30          ABG - ( 30 Oct 2020 16:20 )  pH, Arterial: 7.46  pH, Blood: x     /  pCO2: 42    /  pO2: 81    / HCO3: 30    / Base Excess: 5.4   /  SaO2: 97          ASSESSMENT & PLAN:     # Status Epilepticus  # AMS- Likely Post-ictal  # S/P Intubation For Airway Protection  - VEEG off versed, neurology and neurosurgery f/u.   - c/w Keppra. Decadron  - Extubated yesterday    # H/O Lung Cancer with Mets to brain   - s/p resection (2018)   - Now on Chemo   - Follow up with oncologist (EDUARDO VICTOR MD – [facility]- [phone])    # Metastatic lesions of brain cannot R/O Intraparenchymal hemorrhage  - Neurosurgery follow up  - MRI brain w/wo gado once stable    # DC you  # IV Line: TLC L IJ  # DVT PPX: SCD  # Activity : Bedrest      FOR FOLLOW UP:  [ ] MRI with & without contrast  [ ] Neuro. checks q8h.  [ ] F/u AM labs (CBC, CMP)  [ ] F/u neuro. recommendations

## 2020-10-31 NOTE — PROGRESS NOTE ADULT - SUBJECTIVE AND OBJECTIVE BOX
Patient is a 80y old  Male who presents with a chief complaint of Seizures and Altered  Mental Status (30 Oct 2020 13:16)    Over Night Events: No events overnight SP extubation yesterday, improved MS.    ROS:   All ROS are negative except HPI     PHYSICAL EXAM  ICU Vital Signs Last 24 Hrs  T(C): 36.3 (31 Oct 2020 09:00), Max: 36.4 (31 Oct 2020 04:00)  T(F): 97.3 (31 Oct 2020 09:00), Max: 97.5 (31 Oct 2020 04:00)  HR: 68 (31 Oct 2020 10:00) (50 - 76)  BP: 135/67 (31 Oct 2020 10:00) (115/63 - 158/71)  BP(mean): 110 (31 Oct 2020 08:00) (75 - 114)  RR: 13 (31 Oct 2020 10:00) (10 - 25)  SpO2: 99% (31 Oct 2020 10:00) (95% - 100%)      CONSTITUTIONAL:   NAD    ENT:   Airway patent,   Mouth with normal mucosa.   No thrush    EYES:   Pupils equal,   Round and reactive to light.    CARDIAC:   Normal rate,   Regular rhythm.    No edema    Vascular:  Normal systolic impulse  No Carotid bruits    RESPIRATORY:   No wheezing  Bilateral BS  Normal chest expansion  Not tachypneic,  No use of accessory muscles    GASTROINTESTINAL:  Abdomen soft,   Non-tender,   No guarding,   + BS    MUSCULOSKELETAL:   Range of motion is not limited,  No clubbing, cyanosis    NEUROLOGICAL:   AAOx2  No motor deficits.    SKIN:   Skin normal color for race,   Warm and dry   No evidence of rash.    PSYCHIATRIC:   No apparent risk to self or others.        10-30-20 @ 07:01  -  10-31-20 @ 07:00  --------------------------------------------------------  IN:    IV PiggyBack: 100 mL    Midazolam: 1.5 mL    Midazolam (Status Epilepticus): 2.2 mL    Osmolite: 160 mL  Total IN: 263.7 mL    OUT:    Indwelling Catheter - Urethral (mL): 340 mL    Voided (mL): 601 mL  Total OUT: 941 mL    Total NET: -677.3 mL      LABS:               10.0   21.88 )-----------( 307      ( 30 Oct 2020 04:45 )             32.8     140  |  103  |  21<H>  ----------------------------<  141<H>  3.9   |  26  |  0.8    Ca    7.9<L>      30 Oct 2020 04:45  Mg     2.5     10-30    TPro  5.6<L>  /  Alb  3.1<L>  /  TBili  0.3  /  DBili  x   /  AST  11  /  ALT  6   /  AlkPhos  71  10-30    LIVER FUNCTIONS - ( 30 Oct 2020 04:45 )  Alb: 3.1 g/dL / Pro: 5.6 g/dL / ALK PHOS: 71 U/L / ALT: 6 U/L / AST: 11 U/L / GGT: x           ABG - ( 30 Oct 2020 16:20 )  pH, Arterial: 7.46  pH, Blood: x     /  pCO2: 42    /  pO2: 81    / HCO3: 30    / Base Excess: 5.4   /  SaO2: 97          MEDICATIONS  (STANDING):  chlorhexidine 4% Liquid 1 Application(s) Topical <User Schedule>  dexAMETHasone     Tablet 4 milliGRAM(s) Oral every 6 hours  levETIRAcetam  IVPB 1500 milliGRAM(s) IV Intermittent every 12 hours  pantoprazole  Injectable 40 milliGRAM(s) IV Push daily    MEDICATIONS  (PRN):      < from: TTE Echo Complete w/o Contrast w/ Doppler (10.29.20 @ 09:49) >  Summary:   1. Left ventricular ejection fraction, by visual estimation, is 60 to 65%.   2. No evidence of mitral valve regurgitation.   3. Mild tricuspid regurgitation.   4. Sclerotic aortic valve with decreased opening.  < end of copied text >

## 2020-10-31 NOTE — SWALLOW BEDSIDE ASSESSMENT ADULT - SLP PERTINENT HISTORY OF CURRENT PROBLEM
This is a 80y old  Male who presents with a chief complaint of Seizures and AMS. PMHx significant for Lung ca (dx 05/2018) with mets to brain s/p resection (09/2019; not on seizure ppx or steroids), radiation 2019, Chemo started 2019.

## 2020-10-31 NOTE — PROGRESS NOTE ADULT - ASSESSMENT
IMPRESSION:  Status Epilepticus, resolved  SP extubation  AMS, likely post-ictal, resolved  H/O Lung Cancer with Mets to brain s/p resection (2018) now on Chemo   Metastatic lesions of brain cannot R/O Intraparenchymal hemorrhage  Superficial thrombosis of left soleal vein      PLAN:    CNS:  VEEG negative. c/w Keppra. c/w Decadron. MRI with & without contrast.  Neuro checks q8h.  Neuro following, can downgrade.    HEENT: Oral care    PULMONARY:  HOB @ 45 degrees.  Aspiration precautions    CARDIOVASCULAR:   I=O.      GI: GI prophylaxis.  Feeds for Speech and swallow    RENAL:  Follow up lytes.  Correct as needed    INFECTIOUS DISEASE:  Follow up cultures; No ABX for now     HEMATOLOGICAL:  DVT prophylaxis     ENDOCRINE:  Follow up FS.  Insulin protocol if needed.    MUSCULOSKELETAL: OOB to chair, fall precautions, PT/OT/PMR    Access: peripheral IV  Disposition: Downgrade to [facility] IMPRESSION:  Status Epilepticus, resolved  SP extubation  AMS, likely post-ictal, resolved  H/O Lung Cancer with Mets to brain s/p resection (2018) now on Chemo (5cm lung mass)  Metastatic lesions of brain cannot R/O Intraparenchymal hemorrhage  Superficial thrombosis of left soleal vein      PLAN:    CNS:  VEEG negative. c/w Keppra. c/w Decadron. MRI with & without contrast.  Neuro checks q8h.  Neuro following, can downgrade.    HEENT: Oral care    PULMONARY:  HOB @ 45 degrees.  Aspiration precautions    CARDIOVASCULAR:   I=O.      GI: GI prophylaxis.  Feeds for Speech and swallow    RENAL:  Follow up lytes.  Correct as needed    INFECTIOUS DISEASE:  Follow up cultures; No ABX for now     HEMATOLOGICAL:  DVT prophylaxis     ENDOCRINE:  Follow up FS.  Insulin protocol if needed.    MUSCULOSKELETAL: OOB to chair, fall precautions, PT/OT/PMR    Access: peripheral IV  Disposition: Downgrade to [facility]

## 2020-10-31 NOTE — PROGRESS NOTE ADULT - ASSESSMENT
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     Recommendations:  - continue keppra 1500 mg TID  - ok to downgrade to 3E  - seizure precautions  - continue decadron 4mg Q6  - MRI Brain w/w/o linh  - d/c VEEG

## 2020-10-31 NOTE — PROGRESS NOTE ADULT - SUBJECTIVE AND OBJECTIVE BOX
Neurology Progress Note    Interval History:  Pt extubated, awake and alert.  Oriented x 2 but still with perseveration and ? aphasia.  No reported seizure activity overnight.  On VEEG.      HPI:  81 yo male, Cantonese speaking, pmh Lung ca (dx 05/2018) with mets to brain s/p resection (09/2019; not on seizure ppx or steroids), radiation 2019, Chemo started 2019  complicated by one episode of unresponsives in 2020 when chemo was changed 04/2020( patient was unable to come to the hospital due to COVID) still on chemo last treatment (Oct 15th) ago, presents to ed with wife. As per wife  patient was talking and  taking walking 30mins everday. Last known to be at baseline 10/27/2020.   As per ems pt was unresponsive and shaking in the right upper arm and right side of face for one hour pta.  In ED pt received 6mg IV ativan and loaded with 1G keppra, not back to baseline,  no tracking, not opening eyes to pain, no convulsions, withdrawing to pain.   As per wife at bedside ( ID [id number] & [id number]) patient woke up from bed at 5am this morning, found in bed, with both UE stiff and close to body. States that  had his eyes open but unresponsive. Reports that at baseline he was doing well, without any focal deficits, A&Ox4, ambulating well, sawing and building things. Wife reports no previous hx of seizure or CVA.  In ED s/p CT head showing R frontal brain mets w/ vasogenic edema and L sided postsurgical changes with possible intraparenchymal hemorrhage with or without underlying metastatic lesion. On examination, p/t s/p 6mg IV ativan, loaded w/ 1 g Keppra, not back to baseline. 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     Chemotherapy Medication   Abraxane  Paclitaxel (28 Oct 2020 12:45)      PAST MEDICAL & SURGICAL HISTORY:  Lung cancer    Medications:  chlorhexidine 4% Liquid 1 Application(s) Topical <User Schedule>  dexAMETHasone     Tablet 4 milliGRAM(s) Oral every 6 hours  levETIRAcetam  IVPB 1500 milliGRAM(s) IV Intermittent every 12 hours  pantoprazole  Injectable 40 milliGRAM(s) IV Push daily      Vital Signs Last 24 Hrs  T(C): 36.3 (31 Oct 2020 09:00), Max: 36.4 (31 Oct 2020 04:00)  T(F): 97.3 (31 Oct 2020 09:00), Max: 97.5 (31 Oct 2020 04:00)  HR: 68 (31 Oct 2020 10:00) (50 - 76)  BP: 135/67 (31 Oct 2020 10:00) (115/63 - 158/71)  BP(mean): 110 (31 Oct 2020 08:00) (75 - 114)  RR: 13 (31 Oct 2020 10:00) (10 - 25)  SpO2: 99% (31 Oct 2020 10:00) (95% - 100%)    Neurological Exam:   Mental status: AAOx2 person, place only.  language chinese speaking, full sentences but occasional perseveration.  naming intact.    Cranial nerves: Pupils equally round and reactive to light, visual fields full, no nystagmus, extraocular muscles intact, V1 through V3 intact bilaterally and symmetric, face symmetric, hearing intact to finger rub, palate elevation symmetric, tongue was midline.  Motor:  MRC grading 5/5 b/l UE/LE.   strength 5/5.  Normal tone and bulk.  No abnormal movements.    Sensation: Intact to light touch, proprioception, and pinprick.   Coordination: No dysmetria on finger-to-nose and heel-to-shin.  No dysdiadokinesia.  Reflexes: 2+ in bilateral UE/LE, downgoing toes bilaterally. (-) Barger.  Gait: Narrow and steady. No ataxia.  Romberg negative    Labs:  CBC Full  -  ( 30 Oct 2020 04:45 )  WBC Count : 21.88 K/uL  RBC Count : 3.51 M/uL  Hemoglobin : 10.0 g/dL  Hematocrit : 32.8 %  Platelet Count - Automated : 307 K/uL  Mean Cell Volume : 93.4 fL  Mean Cell Hemoglobin : 28.5 pg  Mean Cell Hemoglobin Concentration : 30.5 g/dL  Auto Neutrophil # : 19.93 K/uL  Auto Lymphocyte # : 0.98 K/uL  Auto Monocyte # : 0.47 K/uL  Auto Eosinophil # : 0.00 K/uL  Auto Basophil # : 0.09 K/uL  Auto Neutrophil % : 91.1 %  Auto Lymphocyte % : 4.5 %  Auto Monocyte % : 2.1 %  Auto Eosinophil % : 0.0 %  Auto Basophil % : 0.4 %    10-30    140  |  103  |  21<H>  ----------------------------<  141<H>  3.9   |  26  |  0.8    Ca    7.9<L>      30 Oct 2020 04:45  Mg     2.5     10-30    TPro  5.6<L>  /  Alb  3.1<L>  /  TBili  0.3  /  DBili  x   /  AST  11  /  ALT  6   /  AlkPhos  71  10-30    LIVER FUNCTIONS - ( 30 Oct 2020 04:45 )  Alb: 3.1 g/dL / Pro: 5.6 g/dL / ALK PHOS: 71 U/L / ALT: 6 U/L / AST: 11 U/L / GGT: x           VEEG (prelim): last 24 hrs no epileptiform activity

## 2020-11-01 NOTE — PROGRESS NOTE ADULT - ASSESSMENT
79 yo male, Cantonese speaking, pmh lung CA (dx 05/2018) with mets to brain s/p resection (09/2019; not on seizure ppx or steroids), s/p radiation 2019, on chemo p/w GTC c/b NCSE. Patient was intubated and admitted to MICU. CTH on 10/29 showed stable hemorrhagic region and edema. Pt. was extubated and downgraded to the [facility].     # Status Epilepticus  # AMS- Likely Post-ictal  # S/P Intubation For Airway Protection  - pt downgraded from the ICU for episode of SE  - likely from mets to the brain   - neuro consulted  - c/w Keppra 1500mg TID  - c/w decadron 4mg q6  - f/u neuro recs    # H/O Lung Cancer with Mets to brain   # Vasogenic Edema   - s/p resection (2018)   - on CXR, Left hilar mass, unchanged.  - Now on Chemo   - c/w steroids, PPI  - Follow up with oncologist (EDUARDO VICTOR MD – [facility]- [phone])  - oncology and palliative eval    # Metastatic lesions of brain cannot R/O Intraparenchymal hemorrhage  - MRI brain- Decreased edema within the LEFT frontal lobe. There is enhancement lining the surgical resection cavity with no definite masslike enhancement, likely representing postoperative change. No definite evidence of recurrence. An LEFT frontal extra-axial postsurgical fluid collection is present, without change.  - Neurosx consulted no indication for sx at this time.   - c/w decadron   - f/u heme/onc and neuro    # BPH - start Flomax and finasteride (home meds)     # Steroid induced leukocytosis - monitor     # Underweight - BMI less than 19, nutritional supplements     # Superficial thrombosis - supportive care       # DC you  # IV Line: TLC L IJ  # DVT PPX: SCD, LMWH  # Activity : Bedrest  #Dispo: f/u with family   79 yo male, Cantonese speaking, pmh lung CA (dx 05/2018) with mets to brain s/p resection (09/2019; not on seizure ppx or steroids), s/p radiation 2019, on chemo p/w GTC c/b NCSE. Patient was intubated and admitted to MICU. CTH on 10/29 showed stable hemorrhagic region and edema. Pt. was extubated and downgraded to the medical [facility].     # Status Epilepticus  # AMS- Likely Post-ictal  # S/P Intubation For Airway Protection  - pt downgraded from the ICU for episode of SE  - likely from mets to the brain   - neuro consulted  - c/w Keppra 1500mg TID  - c/w decadron 4mg q6  - f/u neuro recs    # H/O Lung Cancer with Mets to brain   # Vasogenic Edema   - s/p resection (2018)   - on CXR, Left hilar mass, unchanged.  - Now on Chemo   - c/w steroids, PPI  - Follow up with oncologist (EDUARDO VICTOR MD – [facility]- [phone])  - oncology and palliative eval    # Metastatic lesions of brain cannot R/O Intraparenchymal hemorrhage  - MRI brain- Decreased edema within the LEFT frontal lobe. There is enhancement lining the surgical resection cavity with no definite masslike enhancement, likely representing postoperative change. No definite evidence of recurrence. An LEFT frontal extra-axial postsurgical fluid collection is present, without change.  - Neurosx consulted no indication for sx at this time.   - c/w decadron   - f/u heme/onc and neuro    #hypermagnesemia   - Mg mildly elevated (2.5)  - monitor for now,   - trend BMPs    # BPH - start Flomax and finasteride (home meds)     # Steroid induced leukocytosis - monitor     # Underweight - BMI less than 19, nutritional supplements     # Superficial thrombosis - supportive care       # DC oyu  # IV Line: TLC L IJ  # DVT PPX: SCD, LMWH  # Activity : Bedrest  #Dispo: f/u with family

## 2020-11-01 NOTE — PROGRESS NOTE ADULT - SUBJECTIVE AND OBJECTIVE BOX
Brief Summarry:    Pt seen and examined at bedside. Patient is alert, however oriented times 1-2, confused, wife at bedside states patient's mental status is better today compared to yesterday.     VITAL SIGNS (Last 24 hrs):  T(C): 36.2 (20 @ 11:56), Max: 36.3 (10-31-20 @ 20:55)  HR: 61 (20 @ 11:56) (52 - 70)  BP: 123/67 (20 @ 11:56) (123/67 - 137/72)  RR: 18 (20 @ 11:56) (18 - 20)  SpO2: --  Wt(kg): --  Daily     Daily Weight in k.7 (2020 05:25)    I&O's Summary    31 Oct 2020 08:01  -  2020 07:00  --------------------------------------------------------  IN: 0 mL / OUT: 560 mL / NET: -560 mL        PHYSICAL EXAM:  GENERAL: NAD   HEAD:  Atraumatic, Normocephalic  EYES: EOMI, PERRLA, conjunctiva and sclera clear  NECK: Supple, No JVD  CHEST/LUNG: Clear to auscultation bilaterally; No wheeze  HEART: Regular rate and rhythm; No murmurs, rubs, or gallops  ABDOMEN: Soft, Nontender, Nondistended; Bowel sounds present  EXTREMITIES:  2+ Peripheral Pulses, No clubbing, cyanosis, or edema  PSYCH: AAOx3  NEUROLOGY: alert, confused, motor 5/5 in all ext  SKIN: No rashes or lesions    Labs Reviewed       CBC Full  -  ( 2020 15:35 )  WBC Count : 13.00 K/uL  Hemoglobin : 11.3 g/dL  Hematocrit : 37.2 %  Platelet Count - Automated : 325 K/uL  Mean Cell Volume : 93.5 fL  Mean Cell Hemoglobin : 28.4 pg  Mean Cell Hemoglobin Concentration : 30.4 g/dL  Auto Neutrophil # : x  Auto Lymphocyte # : x  Auto Monocyte # : x  Auto Eosinophil # : x  Auto Basophil # : x  Auto Neutrophil % : x  Auto Lymphocyte % : x  Auto Monocyte % : x  Auto Eosinophil % : x  Auto Basophil % : x    BMP:     @ 15:35    Blood Urea Nitrogen - 27  Calcium - 8.1  Carbond Dioxide - 24  Chloride - 107  Creatinine - 0.8  Glucose - 143  Potassium - 4.3  Sodium - 141      Hemoglobin A1c -     Urine Culture:  10-28 @ 11:05 Urine culture: --    Culture Results:   No growth  Method Type: --  Organism: --  Organism Identification: --  Specimen Source: .Urine Catheterized  10-28 @ 07:20 Urine culture: --    Culture Results:   No growth to date.  Method Type: --  Organism: --  Organism Identification: --  Specimen Source: .Blood Blood-Peripheral         MEDICATIONS  (STANDING):  chlorhexidine 4% Liquid 1 Application(s) Topical <User Schedule>  dexAMETHasone     Tablet 4 milliGRAM(s) Oral every 6 hours  levETIRAcetam  IVPB 1500 milliGRAM(s) IV Intermittent every 12 hours  pantoprazole  Injectable 40 milliGRAM(s) IV Push daily    MEDICATIONS  (PRN):

## 2020-11-01 NOTE — PROGRESS NOTE ADULT - ASSESSMENT
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 Patient was intubated and admitted to MICU. CTH on 10/29 showed stable hemorrhagic region and edema. Pt. was extubated and downgraded to the medical diamond.     # Status Epilepticus  - c/w Keppra  - f/u MRI brain - post op changes, no surgical intervention per neurosx  - neuro following    # H/O Lung Cancer with Mets to brain   # Vasogenic Edema   - c/w steroids, PPI  - c/w Keppra   - Follow up with oncologist (Dr. VICTOR) at [facility]- [phone])  - oncology and palliative evaluation   - start lovenox for dvt ppx    # BPH - start Flomax and finasteride (home meds)     # Steroid induced leukocytosis - monitor     # DVT PPX: Lovenox     #Progress Note Handoff  Pending (specify):  Oncology and palliative consults, Neuro f/u  Family discussion: dw wife at bedside via [place] [employer]    Disposition: TBD        81 yo male, Cantonese speaking, pmh lung CA (dx 05/2018) with mets to brain s/p resection (09/2019; not on seizure ppx or steroids), s/p radiation 2019, on chemo p/w GTC c/b NCSE. Patient was intubated and admitted to MICU. CTH on 10/29 showed stable hemorrhagic region and edema. Pt. was extubated and downgraded to the medical diamond.     # Status Epilepticus  - c/w Keppra  - f/u MRI brain - post op changes, no surgical intervention per neurosx  - neuro following    # H/O Lung Cancer with Mets to brain   # Vasogenic Edema   - c/w steroids, PPI  - c/w Keppra   - Follow up with oncologist (Dr. VICTOR) at [facility]- [phone])  - oncology and palliative evaluation   - start lovenox for dvt ppx    # BPH - start Flomax and finasteride (home meds)     # Steroid induced leukocytosis - monitor     # Underweight - BMI less than 19, nutritional supplements     # Superficial thrombosis - supportive care     # DVT PPX: Lovenox     #Progress Note Handoff  Pending (specify):  Oncology and palliative consults, Neuro f/u  Family discussion: dw wife at bedside via Cantonese [place]    Disposition: TBD

## 2020-11-01 NOTE — PROGRESS NOTE ADULT - SUBJECTIVE AND OBJECTIVE BOX
SUBJECTIVE:    Patient is a 80y old Male who presents with a chief complaint of Seizures and Altered  Mental Status (01 Nov 2020 17:20)    Currently admitted to medicine with the primary diagnosis of Status epilepticus    Today is hospital day 4d. This morning he is resting comfortably in bed and reports no new issues or overnight events per the staff. Pt speaks only Cantonese      PAST MEDICAL & SURGICAL HISTORY  Lung cancer      SOCIAL HISTORY:  Negative for smoking/alcohol/drug use.     ALLERGIES:  No Known Allergies    MEDICATIONS:  STANDING MEDICATIONS  chlorhexidine 4% Liquid 1 Application(s) Topical <User Schedule>  dexAMETHasone     Tablet 4 milliGRAM(s) Oral every 6 hours  enoxaparin Injectable 40 milliGRAM(s) SubCutaneous daily  levETIRAcetam  IVPB 1500 milliGRAM(s) IV Intermittent every 12 hours  pantoprazole  Injectable 40 milliGRAM(s) IV Push daily    PRN MEDICATIONS      LABS:                        11.3   13.00 )-----------( 325      ( 01 Nov 2020 15:35 )             37.2     11-01    141  |  107  |  27<H>  ----------------------------<  143<H>  4.3   |  24  |  0.8    Ca    8.1<L>      01 Nov 2020 15:35  Mg     2.5     11-01                    RADIOLOGY:    VITALS:   T(F): 96.2, Max: 97.1 (11-01-20 @ 11:56)  HR: 57  BP: 130/71  RR: 18  SpO2: --  PHYSICAL EXAM:  GEN: No acute distress  LUNGS: Clear to auscultation bilaterally, no wheezes rhonchi or rales  HEART: Regular rate and rhythm, S1, S2 auscultated, no murmurs, rubs, or gallops  ABD: Soft, non-tender, non-distended.  EXT: No edema,   NEURO: unable to assess, pt does not speak english, wife mentioned that pt is more confused than usual     Intravenous access:   NG tube:   Mcghee Catheter:   Indwelling Urethral Catheter:     Connect To:  Straight Drainage/Gravity    Indication:  Urine Output Monitoring in Critically Ill (10-29-20 @ 12:53) (not performed) [Active]  Indwelling Urethral Catheter:     Connect To:  Leg Bag    Indication:  Urine Output Monitoring in Critically Ill (10-28-20 @ 09:56) (not performed) [Active]  Indwelling Urethral Catheter:     Connect To:  Straight Drainage/Gravity    Indication:  Urine Output Monitoring in Critically Ill (10-28-20 @ 07:16) (not performed) [Active]

## 2020-11-01 NOTE — CHART NOTE - NSCHARTNOTEFT_GEN_A_CORE
MR head Images reviewed with Dr Sinclair    No definite masslike enhancement, likely representing postoperative change.  Previously demonstrated RIGHT frontal enhancing mass is no longer present.    -No further Neurosurgical intervention  -Continue seizure management per Neurology  -Recommendations to follow up outpatient with Rad/onc

## 2020-11-02 NOTE — CONSULT NOTE ADULT - SUBJECTIVE AND OBJECTIVE BOX
REQUESTED OF: DR. SOLORZANO    Chart reviewed, Hospital Day 6    GORAN LANTIGUA 80yMale  HPI:  81 yo male, Cantonese speaking, pmh Lung ca (dx 05/2018) with mets to brain s/p resection (09/2019; not on seizure ppx or steroids), radiation 2019, Chemo started 2019  complicated by one episode of unresponsives in 2020 when chemo was changed 04/2020( patient was unable to come to the hospital due to COVID) still on chemo last treatment (Oct 15th) ago, presents to ed with wife. As per wife  patient was talking and  taking walking 30mins everday. Last known to be at baseline 10/27/2020.   As per ems pt was unresponsive and shaking in the right upper arm and right side of face for one hour pta.  In ED pt received 6mg IV ativan and loaded with 1G keppra, not back to baseline,  no tracking, not opening eyes to pain, no convulsions, withdrawing to pain.   As per wife at bedside ( ID [id number]) patient woke up from bed at 5am this morning, found in bed, with both UE stiff and close to body. States that  had his eyes open but unresponsive. Reports that at baseline he was doing well, without any focal deficits, A&Ox4, ambulating well, sawing and building things. Wife reports no previous hx of seizure or CVA.    In ED s/p CT head showing R frontal brain mets w/ vasogenic edema and L sided postsurgical changes with possible intraparenchymal hemorrhage with or without underlying metastatic lesion. On examination, p/t s/p 6mg IV ativan, loaded w/ 1 g Keppra, not back to baseline. 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     Interim Hx:   Intubated for airway protection upon admission  Extubated 10/30  Downgraded 10/31  Neurology following- steroids and Keppra   EEG showed no seizure activity  Oncologist is at [facility] Dr. Wasserman   No NS recommendations   Awaiting heme-onc input  mental status improving as of yesterday    Chemotherapy Medication   Abraxane  Paclitaxel (28 Oct 2020 12:45)        PAST MEDICAL & SURGICAL HISTORY:  Lung cancer        Subjective and Objective:  Today,  Discussed with     Focused Palliative Care Evaluation:                   Symptoms:                                      Pain                                     Dyspnea                                     N/V                                     Appetite                                     Anxiety                                     Other _____________________                     Support Devices:              PHYSICAL EXAM:      Constitutional:    Eyes:    ENMT:    Neck:    Breasts:    Back:    Respiratory:    Cardiovascular:    Gastrointestinal:    Genitourinary:    Rectal:    Extremities:    Vascular:    Neurological:    Skin:    Lymph Nodes:    Musculoskeletal:    Psychiatric:        T(C): 35.4, Max: 36.2 (11:56)  HR: 51 (51 - 61)  BP: 143/71 (123/67 - 143/71)  RR: 18 (18 - 18)  SpO2: --      LABS/STUDIES:  11-02    136  |  103  |  26<H>  ----------------------------<  115<H>  4.6   |  19  |  0.8    Ca    7.8<L>      02 Nov 2020 06:26  Mg     2.5     11-02    TPro  5.6<L>  /  Alb  3.3<L>  /  TBili  0.3  /  DBili  x   /  AST  26  /  ALT  18  /  AlkPhos  75  11-02                            11.7   12.58 )-----------( 251      ( 02 Nov 2020 06:26 )             38.7       MEDICATIONS  (STANDING):  chlorhexidine 4% Liquid 1 Application(s) Topical <User Schedule>  dexAMETHasone     Tablet 4 milliGRAM(s) Oral every 6 hours  enoxaparin Injectable 40 milliGRAM(s) SubCutaneous daily  levETIRAcetam 1500 milliGRAM(s) Oral two times a day  pantoprazole    Tablet 40 milliGRAM(s) Oral before breakfast    MEDICATIONS  (PRN):          iStop: This report was requested by: Bhakti Millan | Reference #: [id number]   Patient Name: Goran Lantigua   YOB: 1940   Address: [address] [place] [address]   Sex: Male   Rx Written	Rx Dispensed	Drug	Quantity	Days Supply	Prescriber Name	Payment Method	Dispenser  11/21/2019	11/21/2019	lorazepam 0.5 mg tablet 	30	8			          PPS  Level    __________       Note PPS = Palliative Performance Scale; (c)[address] [place] [address] [employer]osp[employer] Society       Range from 100% meaning Full ambulation/self-care/intake/Level of Consicous                                                                              to        10% meaning Bedbound/Unable to do any activity/extensive disease /Total Care/ No PO intake/ LOC=Full/drowsy/+/-confusion        (0% = death)                     Prior to acute illness, patient's functionality reportedly

## 2020-11-02 NOTE — DISCHARGE NOTE NURSING/CASE MANAGEMENT/SOCIAL WORK - PATIENT PORTAL LINK FT
You can access the FollowMyHealth Patient Portal offered by [facility] by registering at the following website: [email or web address]. By joining [facility]’s FollowMyHealth portal, you will also be able to view your health information using other applications (apps) compatible with our system.

## 2020-11-02 NOTE — PROGRESS NOTE ADULT - ATTENDING COMMENTS
Attending Statement: I have personally performed a face to face diagnostic evaluation on this patient. The patient is suffering from Status Epilepticus. I have reviewed the above note and agree with the history, exam and suggestions for care, except as I have noted in the text.
Patient seen today with neurology NP Sarmad and [facility] resident/fellow team.     I was physically present for the key portions of the evaluation and management (E/M) service provided.  I agree with the above history, physical, and plan with the following additions/modifications     Patient still on low dose midaz drip - but is awake, attentive, non-focal.  EEG with focal slowing but no epileptiform discharges, no sz.  Plan to stop midazolam, continue CEEG while weaning, CPAP and likely extubation today.  CTH yesterday showing stable hemorrhagic region and edema.  Continue high dose steroids with taper, and keppra indefinitely for prophylaxis.      Minh Shepherd MD  Attending Neurointensivist
I have personally seen and examined this patient with neurology team on 11/2.  I have fully participated in the care of this patient.  I have reviewed all pertinent clinical information, including history, physical exam, plan and note.  Patient is alert and oriented with no focal deficit. Brain MRI reviewed which showed no clear evidence of Brain Mets. Post surgical changes and edema. Recommend to continue Keppra and Decadron and outpatient follow up with his outpatient oncologist and neurology.  I have reviewed all pertinent clinical information and reviewed all relevant imaging and diagnostic studies personally.  Recommendations as above.  Agree with above assessment except as noted.

## 2020-11-02 NOTE — PROGRESS NOTE ADULT - ASSESSMENT
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 MRI w/ contrast no definitive evidence of recurrence of brain mets. No further NS intervention as per neurosurgery.       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 MRI w/ contrast showing no definitive evidence of recurrence of brain mets. No further NS intervention as per neurosurgery.       Recommendations:   -c/w keppra 1500mg po BID  -c/w decadron 4mg q6  -follow up o/p with neurology  -follow up o/p with oncology       **attending attestation to follow**

## 2020-11-02 NOTE — DISCHARGE NOTE PROVIDER - HOSPITAL COURSE
81 yo male, Cantonese speaking, pmh Lung ca (dx 05/2018) with mets to brain s/p resection (09/2019; not on seizure ppx or steroids), radiation 2019, Chemo started 2019  complicated by one episode of unresponsives in 2020 when chemo was changed 04/2020( patient was unable to come to the hospital due to COVID) still on chemo last treatment (Oct 15th) ago, presents to ed with wife. As per wife  patient was talking and  taking walking 30mins everday. Last known to be at baseline 10/27/2020.   As per ems pt was unresponsive and shaking in the right upper arm and right side of face for one hour pta.  In ED pt received 6mg IV ativan and loaded with 1G keppra, not back to baseline,  no tracking, not opening eyes to pain, no convulsions, withdrawing to pain.   As per wife at bedside ( ID [id number] & [id number]) patient woke up from bed at 5am this morning, found in bed, with both UE stiff and close to body. States that  had his eyes open but unresponsive. Reports that at baseline he was doing well, without any focal deficits, A&Ox4, ambulating well, sawing and building things. Wife reports no previous hx of seizure or CVA.    In ED s/p CT head showing R frontal brain mets w/ vasogenic edema and L sided postsurgical changes with possible intraparenchymal hemorrhage with or without underlying metastatic lesion. On examination, p/t s/p 6mg IV ativan, loaded w/ 1 g Keppra, not back to baseline. 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   He was admitted due to AMS due to status epileticus , intubated and monitored in the MICU. Later, he was downgraded to the heme-onc floor. Patient's status epilepticus was resolved with keppra . He would like to go home. He is medically clear for DC . He will need to FU with his oncologist outpatient.

## 2020-11-02 NOTE — DISCHARGE NOTE PROVIDER - NSDCMRMEDTOKEN_GEN_ALL_CORE_FT
Calcium 500+D oral tablet, chewable: 1 tab(s) orally 2 times a day  carvedilol 3.125 mg oral tablet: 1 tab(s) orally once a day  dexamethasone 4 mg oral tablet: 1 tab(s) orally every 8 hours   finasteride 5 mg oral tablet: 1 tab(s) orally once a day  guaiFENesin 600 mg oral tablet, extended release: 1 tab(s) orally every 12 hours  levETIRAcetam 750 mg oral tablet: 2 tab(s) orally 2 times a day  pantoprazole 40 mg oral delayed release tablet: 1 tab(s) orally once a day (before a meal)  Robafen DM 10 mg-100 mg/5 mL oral liquid: 10 milliliter(s) orally every 6 hours for 7days  tamsulosin 0.4 mg oral capsule: 1 cap(s) orally once a day  Luis-24 200 mg/24 hours oral capsule, extended release: 1 cap(s) orally once a day

## 2020-11-02 NOTE — PHYSICAL THERAPY INITIAL EVALUATION ADULT - PERTINENT HX OF CURRENT PROBLEM, REHAB EVAL
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 Patient was intubated and admitted to MICU. CTH on 10/29 showed stable hemorrhagic region and edema. Pt. was extubated and downgraded to the medical diamond.

## 2020-11-02 NOTE — PHYSICAL THERAPY INITIAL EVALUATION ADULT - PHYSICAL ASSIST/NONPHYSICAL ASSIST: SIT/STAND, REHAB EVAL
Ziyad Carrasco     1949       Sage Bush MD, [facility] [place]  Date of Visit-5/31/2018   PCP is Gaetano Cain MD   [facility] [place]  [facility]  HPI:  Ziyad Carrasco is a 77 y.o. female   Seen in 2011 with an EF of 20% and previous normal coronaries, had noted fluid increase when seen for the first time on 5/10/18 as she was changing doctors. We increased her Bumex and were cautious in the use of diuretics due to her renal failure. We arranged for her to have follow up with EP clinic. She had blood work that showed a GFR of 26 and a normal Hgb and Mag. An echo is done today and shows an EF of about 25%. Her weight is 131 which is similar to previous visit. Overall the pt states she is doing well. The pt's daughter states that they weren't able to get down to her baseline weight of 126. She reports that her shortness of breath is unchanged. The pt states that she is frustrated with her fluctuating weight. She inquired how much water she should be drinking throughout the day. The pt is monitoring her blood pressure every morning and this has been well controlled below 140. Denies chest pain, edema, syncope, has no tachycardia, palpitations or sense of arrhythmia. Assessment/Plan:     1. NICM, CHF systolic, NYHA II, EF looks like baseline and she has AICD. No ACE due to CKD III. Weights are stable on this dose of bumex and understands how to titrate. 2. Cardiorenal syndrome, stable renal function I suspect now that the Hgb has improved    3. Continue statin and aspirin    4. AICD seeing Dr. Hugo Quigley in 2 months     5. No objection to the tetrabenazine per neurology and we see no side effects. 6. Follow up in 3 months. Impression:   1. Chronic systolic heart failure ([address]    2. Dilated cardiomyopathy ([address]    3. Essential hypertension, benign    4. Pure hypercholesterolemia    5.  Type 2 diabetes with nephropathy (HCC)    6. Cardiorenal syndrome with renal failure, stage 1-4 or unspecified chronic kidney disease, with heart failure (HCC)         ROS-except as noted above. . A complete cardiac and respiratory are reviewed and negative except as above ; Resp-denies wheezing  or productive cough,. Const- No unusual weight loss or fever; Neuro-no recent seizure or CVA ; GI- No BRBPR, abdom pain, bloating ; - no  hematuria   see supplement sheet, initialed and to be scanned by staff  Past Medical History:   Diagnosis Date    Calculus of kidney     Cardiomyopathy ([address]     idiopathic    Chronic systolic heart failure [facility])     April 2010 ef 20%, by Dec 2010 up to 50% on meds, cath with normal cors    Diabetes [facility])     Essential hypertension, benign     Hypercholesterolemia       Social Hx= reports that she has never smoked. She has never used smokeless tobacco. She reports that she does not drink alcohol or use illicit drugs. Exam and Labs:  /70 (BP 1 Location: Left arm, BP Patient Position: Sitting)  Pulse 79  Resp 16  Ht 5' 1\" (1.549 m)  Wt 131 lb (59.4 kg)  SpO2 99%  BMI 24.75 kg/b7Ghjologswwicvo:  NAD, comfortable  Head: NC,AT. Eyes: No scleral icterus. Neck:  Neck supple. No JVD present. Throat: moist mucous membranes. Chest: Effort normal & normal respiratory excursion . Neurological: alert, conversant and oriented . Skin: Skin is not cold. No obvious systemic rash noted. Not diaphoretic. No erythema. Psychiatric:  Grossly normal mood and affect. Behavior appears normal. Extremities:  no clubbing or cyanosis. Abdomen: non distended    Lungs:breath sounds normal. No stridor. distress, wheezes or  Rales. Heart: normal rate, regular rhythm, normal S1, S2, no murmurs, rubs, clicks or gallops , PMI non displaced. Edema: Edema is none.   Lab Results   Component Value Date/Time    Cholesterol, total 141 05/03/2018 08:27 AM    HDL Cholesterol 33 (L) 05/03/2018 08:27 AM    LDL, calculated 76 05/03/2018 08:27 AM    Triglyceride 158 (H) 05/03/2018 08:27 AM    CHOL/HDL Ratio 5.1 (H) 04/22/2010 12:40 AM     Lab Results   Component Value Date/Time    Sodium 137 05/25/2018 07:51 AM    Potassium 5.0 05/25/2018 07:51 AM    Chloride 98 05/25/2018 07:51 AM    CO2 24 05/25/2018 07:51 AM    Anion gap 10 05/21/2010 09:43 AM    Glucose 128 (H) 05/25/2018 07:51 AM    BUN 60 (H) 05/25/2018 07:51 AM    Creatinine 1.92 (H) 05/25/2018 07:51 AM    BUN/Creatinine ratio 31 (H) 05/25/2018 07:51 AM    GFR est AA 30 (L) 05/25/2018 07:51 AM    GFR est non-AA 26 (L) 05/25/2018 07:51 AM    Calcium 9.4 05/25/2018 07:51 AM      Wt Readings from Last 3 Encounters:   05/11/18 134 lb (60.8 kg)   05/10/18 133 lb (60.3 kg)   05/02/18 132 lb 9.6 oz (60.1 kg)      BP Readings from Last 3 Encounters:   05/11/18 140/90   05/10/18 110/70   05/02/18 (!) 178/99      Current Outpatient Prescriptions   Medication Sig    bumetanide (BUMEX) 1 mg tablet Take 1 Tab by mouth daily.  Tetrabenazine 12.5 mg tab daily for 1 wk, then 2 daily    glipiZIDE (GLUCOTROL) 5 mg tablet TAKE 1/2 TABLET BY MOUTH ONCE A DAY    mirtazapine (REMERON) 15 mg tablet TAKE 1 TABLET BY MOUTH AT BEDTIME FOR 1 WEEK, THEN 2 TABLETS NIGHTLY    aspirin delayed-release 81 mg tablet Take  by mouth daily.  simvastatin (ZOCOR) 20 mg tablet Take 1 Tab by mouth nightly.  carvedilol (COREG) 25 mg tablet Take 1 Tab by mouth two (2) times daily (with meals). No current facility-administered medications for this visit. Impression see above.       Written by Poncho Gudino, as dictated by Suzette Vang MD. 1 person assist

## 2020-11-02 NOTE — SWALLOW BEDSIDE ASSESSMENT ADULT - SWALLOW EVAL: RECOMMENDED FEEDING/EATING TECHNIQUES
maintain upright posture during/after eating for 30 mins
maintain upright posture during/after eating for 30 mins/position upright (90 degrees)/small sips/bites

## 2020-11-02 NOTE — PROGRESS NOTE ADULT - SUBJECTIVE AND OBJECTIVE BOX
Neurology Progress Note    Interval History:   #[id number] used during examination. Patient alert, resting comfortably in bed. Reports feeling well, no acute issues. Patient A&O to person, place, situation, but not to time. As per wife at bedside, pt not back to baseline, with some memory difficulties. No seizures reported overnight.     HPI:  81 yo male, Cantonese speaking, pmh Lung ca (dx 05/2018) with mets to brain s/p resection (09/2019; not on seizure ppx or steroids), radiation 2019, Chemo started 2019  complicated by one episode of unresponsives in 2020 when chemo was changed 04/2020( patient was unable to come to the hospital due to COVID) still on chemo last treatment (Oct 15th) ago, presents to ed with wife. As per wife  patient was talking and  taking walking 30mins everday. Last known to be at baseline 10/27/2020.   As per ems pt was unresponsive and shaking in the right upper arm and right side of face for one hour pta.  In ED pt received 6mg IV ativan and loaded with 1G keppra, not back to baseline,  no tracking, not opening eyes to pain, no convulsions, withdrawing to pain.   As per wife at bedside ( ID [id number] & [id number]) patient woke up from bed at 5am this morning, found in bed, with both UE stiff and close to body. States that  had his eyes open but unresponsive. Reports that at baseline he was doing well, without any focal deficits, A&Ox4, ambulating well, sawing and building things. Wife reports no previous hx of seizure or CVA.    In ED s/p CT head showing R frontal brain mets w/ vasogenic edema and L sided postsurgical changes with possible intraparenchymal hemorrhage with or without underlying metastatic lesion. On examination, p/t s/p 6mg IV ativan, loaded w/ 1 g Keppra, not back to baseline. 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     Chemotherapy Medication   Abraxane  Paclitaxel (28 Oct 2020 12:45)      PAST MEDICAL & SURGICAL HISTORY:  Lung cancer            Medications:  chlorhexidine 4% Liquid 1 Application(s) Topical <User Schedule>  dexAMETHasone     Tablet 4 milliGRAM(s) Oral every 6 hours  enoxaparin Injectable 40 milliGRAM(s) SubCutaneous daily  levETIRAcetam 1500 milliGRAM(s) Oral two times a day  pantoprazole    Tablet 40 milliGRAM(s) Oral before breakfast      Vital Signs Last 24 Hrs  T(C): 35.4 (02 Nov 2020 04:15), Max: 36.2 (01 Nov 2020 11:56)  T(F): 95.8 (02 Nov 2020 04:15), Max: 97.1 (01 Nov 2020 11:56)  HR: 51 (02 Nov 2020 04:15) (51 - 61)  BP: 143/71 (02 Nov 2020 04:15) (123/67 - 143/71)  BP(mean): --  RR: 18 (02 Nov 2020 04:15) (18 - 18)  SpO2: --    Neurological Exam:   Mental status: AAOx3 person, place, situation only.  Cantonese speaking, full sentences   Cranial nerves: Pupils equally round and reactive to light, visual fields full, no nystagmus, extraocular muscles intact, V1 through V3 intact bilaterally and symmetric, face symmetric, hearing intact to finger rub, palate elevation symmetric, tongue was midline.  Motor:  MRC grading 5/5 b/l UE/LE.   strength 5/5.  Normal tone and bulk.  No abnormal movements.    Sensation: Intact to light touch   Coordination: No dysmetria on finger-to-nose and heel-to-shin.  No dysdiadokinesia.  Reflexes: 2+ in bilateral UE/LE, downgoing toes bilaterally. (-) Barger.        Labs:  CBC Full  -  ( 02 Nov 2020 06:26 )  WBC Count : 12.58 K/uL  RBC Count : 4.09 M/uL  Hemoglobin : 11.7 g/dL  Hematocrit : 38.7 %  Platelet Count - Automated : 251 K/uL  Mean Cell Volume : 94.6 fL  Mean Cell Hemoglobin : 28.6 pg  Mean Cell Hemoglobin Concentration : 30.2 g/dL  Auto Neutrophil # : 10.95 K/uL  Auto Lymphocyte # : 0.97 K/uL  Auto Monocyte # : 0.56 K/uL  Auto Eosinophil # : 0.00 K/uL  Auto Basophil # : 0.02 K/uL  Auto Neutrophil % : 87.0 %  Auto Lymphocyte % : 7.7 %  Auto Monocyte % : 4.5 %  Auto Eosinophil % : 0.0 %  Auto Basophil % : 0.2 %    11-02    136  |  103  |  26<H>  ----------------------------<  115<H>  4.6   |  19  |  0.8    Ca    7.8<L>      02 Nov 2020 06:26  Mg     2.5     11-02    TPro  5.6<L>  /  Alb  3.3<L>  /  TBili  0.3  /  DBili  x   /  AST  26  /  ALT  18  /  AlkPhos  75  11-02    LIVER FUNCTIONS - ( 02 Nov 2020 06:26 )  Alb: 3.3 g/dL / Pro: 5.6 g/dL / ALK PHOS: 75 U/L / ALT: 18 U/L / AST: 26 U/L / GGT: x                   < from: MR Head w/wo IV Cont (11.01.20 @ 09:42) >    EXAM:  MR BRAIN WAW IC            PROCEDURE DATE:  11/01/2020            INTERPRETATION:  CLINICAL INDICATION: Metastatic lung cancer. Status epilepticus.    TECHNIQUE: MRI of the brain was performed without and with contrast. 5 mL of Gadavist was administered intravenously. 2.5 mL was discarded.    COMPARISON: MRI brain 9/16/2019. CT brain 10/29/2020. MRI brain 6/9/2020    FINDINGS:  Left frontal craniotomy with subjacent left frontal encephalomalacia.    There is decreased edema within the LEFT frontal lobe. There is enhancement lining the surgical resection cavity with no definite masslike enhancement, likely representing postoperative change. An LEFT frontal extra-axial fluid collection is present, without change.    Previously demonstrated RIGHT frontal enhancing mass is no longer present. Small amount of left frontal encephalomalacia.    There is no evidence of acute infarct, acute intracranial hemorrhage, mass effect or hydrocephalus. Basal cisterns are patent.    Ventricles and sulci are age-appropriate in size.    Major intracranial flow-voids are preserved.    The orbits, sellar and suprasellar structures, and craniocervical junction are unremarkable. Visualized paranasal sinuses and mastoid air cells are clear.    IMPRESSION:  Decreased edema within the LEFT frontal lobe. There is enhancement lining the surgical resection cavity with no definite masslike enhancement, likely representing postoperative change. No definite evidence of recurrence.    An LEFT frontal extra-axial postsurgical fluid collection is present, without change.    Previously demonstrated RIGHT frontal enhancing mass is no longer present.                NORBERTO CLINE MD; Attending Radiologist  This document has been electronically signed. Nov 1 2020 12:23PM    < end of copied text >      < from: EEG w/ Video Each 12-26 Hours, Unmonitored (10.31.20 @ 12:40) >     EXAM:  VEEG EA 12-26 HR UNMONITORED        PROCEDURE DATE:  10/31/2020        INTERPRETATION:  Exam Performed on:  The digital V-EEG (video-encephalogram) was recorded using [employer] System.  The standard 10-20 System based on American clinical Neurophysiology guidelines was applied and the anterior temporal T1, T2 electrodes were added when needed.  The encephalography was acquired using a minimum number of 19 and 23 channels respectively for pediatrics and adults. Raw data was reviewed in detail.    History  80 year old man with brain metastasis and focal seizures    Diagnosis and purpose of monitoring:  Characterization and assessment    Medication    Medication  Dosage  Date  Comment  Levetiracetam  -  2020/10/30  -    Day#1: 10/29/2020 (10:07:20) to 10/30/2020 (08:34:43):      Awake:  The recording during the wakefulness consists of a symmetrical and well-organized background and posterior dominant rhythm in the range of 7-8 Hz, which is reactive to eye opening and eye closure and change in the level of alertness.    Asleep:  Different stages of sleep were presented well.  Stage II of non-REM sleep was characterized by the presence of symmetrical and well-defined sleep spindles and vertex sharp waves.  The deeperstages of sleep were identified by the presence of low theta and delta range activity seen diffusely over both hemispheres and more prominently from the frontal and central derivations.  All stages captured and symmetric.    Focal Slowing:  Moderate -severe left hemispheric slowing  Mild right hemispheric slowing      Generalized Slowing:  mild    Interictal Activity  No epileptiform activity    Activation and Provocation Procedures:  Not performed      Events:  No events captured        Day #2: 10/30/2020 (09:00:00) to 10/31/2020 (09:00:00)        Awake:  The recording during the wakefulness consists of a symmetrical and well-organized background and posterior dominant rhythm in the range of 7-8 Hz, which is reactive to eye opening and eye closure and change in the level of alertness.    Asleep:  Different stages of sleep were presented well.  Stage II of non-REM sleep was characterized by the presence of symmetrical and well-defined sleep spindles and vertex sharp waves.  The deeper stages of sleep were identified by the presence of low theta and delta range activity seen diffusely over both hemispheres and more prominently from the frontal and central derivations.  All stages captured and symmetric.    Focal Slowing:  Moderate -severe left hemispheric slowing  Mild right hemispheric slowing      Generalized Slowing:  mild    Interictal Activity  No epileptiform activity    Activation and Provocation Procedures:  Not performed      Events:  No events captured        Impression  This is an abnormal Video EEG study with mild generalized background slowing and moderately severe left hemispheric and mild right hemisphere focal slowing, consistent with a structural lesion in that area. There is no epileptiform activity or clinical/subclinical seizure activity noted in the study.              FAVIO CAMPOS MD; Attending Neurologist  This document has been electronically signed. Oct 31 2020 11:13AM    < end of copied text >     Neurology Progress Note    Interval History:   #[id number] used during examination. Patient alert, resting comfortably in bed. Reports feeling well, no acute issues. Patient A&O to person, place, situation, but not to time. As per wife at bedside, pt not back to baseline, with some memory difficulties. No seizures reported overnight.     HPI:  81 yo male, Cantonese speaking, pmh Lung ca (dx 05/2018) with mets to brain s/p resection (09/2019; not on seizure ppx or steroids), radiation 2019, Chemo started 2019  complicated by one episode of unresponsives in 2020 when chemo was changed 04/2020( patient was unable to come to the hospital due to COVID) still on chemo last treatment (Oct 15th) ago, presents to ed with wife. As per wife  patient was talking and  taking walking 30mins everday. Last known to be at baseline 10/27/2020.   As per ems pt was unresponsive and shaking in the right upper arm and right side of face for one hour pta.  In ED pt received 6mg IV ativan and loaded with 1G keppra, not back to baseline,  no tracking, not opening eyes to pain, no convulsions, withdrawing to pain.   As per wife at bedside ( ID [id number] & [id number]) patient woke up from bed at 5am this morning, found in bed, with both UE stiff and close to body. States that  had his eyes open but unresponsive. Reports that at baseline he was doing well, without any focal deficits, A&Ox4, ambulating well, sawing and building things. Wife reports no previous hx of seizure or CVA.    In ED s/p CT head showing R frontal brain mets w/ vasogenic edema and L sided postsurgical changes with possible intraparenchymal hemorrhage with or without underlying metastatic lesion. On examination, p/t s/p 6mg IV ativan, loaded w/ 1 g Keppra, not back to baseline. 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     Chemotherapy Medication   Abraxane  Paclitaxel (28 Oct 2020 12:45)      PAST MEDICAL & SURGICAL HISTORY:  Lung cancer            Medications:  chlorhexidine 4% Liquid 1 Application(s) Topical <User Schedule>  dexAMETHasone     Tablet 4 milliGRAM(s) Oral every 6 hours  enoxaparin Injectable 40 milliGRAM(s) SubCutaneous daily  levETIRAcetam 1500 milliGRAM(s) Oral two times a day  pantoprazole    Tablet 40 milliGRAM(s) Oral before breakfast      Vital Signs Last 24 Hrs  T(C): 35.4 (02 Nov 2020 04:15), Max: 36.2 (01 Nov 2020 11:56)  T(F): 95.8 (02 Nov 2020 04:15), Max: 97.1 (01 Nov 2020 11:56)  HR: 51 (02 Nov 2020 04:15) (51 - 61)  BP: 143/71 (02 Nov 2020 04:15) (123/67 - 143/71)  BP(mean): --  RR: 18 (02 Nov 2020 04:15) (18 - 18)  SpO2: --    Neurological Exam:   Mental status: AAOx3 person, place, situation only.  Cantonese speaking, full sentences   Cranial nerves: Pupils equally round and reactive to light, visual fields full, no nystagmus, extraocular muscles intact, V1 through V3 intact bilaterally and symmetric, face symmetric, hearing intact to finger rub, palate elevation symmetric, tongue was midline.  Motor:  MRC grading 5/5 b/l UE/LE.   strength 5/5.  Normal tone and bulk.  No abnormal movements.    Sensation: Intact to light touch   Coordination: No dysmetria on finger-to-nose and heel-to-shin.  No dysdiadokinesia.  Reflexes: 2+ in bilateral UE/LE, downgoing toes bilaterally.         Labs:  CBC Full  -  ( 02 Nov 2020 06:26 )  WBC Count : 12.58 K/uL  RBC Count : 4.09 M/uL  Hemoglobin : 11.7 g/dL  Hematocrit : 38.7 %  Platelet Count - Automated : 251 K/uL  Mean Cell Volume : 94.6 fL  Mean Cell Hemoglobin : 28.6 pg  Mean Cell Hemoglobin Concentration : 30.2 g/dL  Auto Neutrophil # : 10.95 K/uL  Auto Lymphocyte # : 0.97 K/uL  Auto Monocyte # : 0.56 K/uL  Auto Eosinophil # : 0.00 K/uL  Auto Basophil # : 0.02 K/uL  Auto Neutrophil % : 87.0 %  Auto Lymphocyte % : 7.7 %  Auto Monocyte % : 4.5 %  Auto Eosinophil % : 0.0 %  Auto Basophil % : 0.2 %    11-02    136  |  103  |  26<H>  ----------------------------<  115<H>  4.6   |  19  |  0.8    Ca    7.8<L>      02 Nov 2020 06:26  Mg     2.5     11-02    TPro  5.6<L>  /  Alb  3.3<L>  /  TBili  0.3  /  DBili  x   /  AST  26  /  ALT  18  /  AlkPhos  75  11-02    LIVER FUNCTIONS - ( 02 Nov 2020 06:26 )  Alb: 3.3 g/dL / Pro: 5.6 g/dL / ALK PHOS: 75 U/L / ALT: 18 U/L / AST: 26 U/L / GGT: x                   < from: MR Head w/wo IV Cont (11.01.20 @ 09:42) >    EXAM:  MR BRAIN WAW IC            PROCEDURE DATE:  11/01/2020            INTERPRETATION:  CLINICAL INDICATION: Metastatic lung cancer. Status epilepticus.    TECHNIQUE: MRI of the brain was performed without and with contrast. 5 mL of Gadavist was administered intravenously. 2.5 mL was discarded.    COMPARISON: MRI brain 9/16/2019. CT brain 10/29/2020. MRI brain 6/9/2020    FINDINGS:  Left frontal craniotomy with subjacent left frontal encephalomalacia.    There is decreased edema within the LEFT frontal lobe. There is enhancement lining the surgical resection cavity with no definite masslike enhancement, likely representing postoperative change. An LEFT frontal extra-axial fluid collection is present, without change.    Previously demonstrated RIGHT frontal enhancing mass is no longer present. Small amount of left frontal encephalomalacia.    There is no evidence of acute infarct, acute intracranial hemorrhage, mass effect or hydrocephalus. Basal cisterns are patent.    Ventricles and sulci are age-appropriate in size.    Major intracranial flow-voids are preserved.    The orbits, sellar and suprasellar structures, and craniocervical junction are unremarkable. Visualized paranasal sinuses and mastoid air cells are clear.    IMPRESSION:  Decreased edema within the LEFT frontal lobe. There is enhancement lining the surgical resection cavity with no definite masslike enhancement, likely representing postoperative change. No definite evidence of recurrence.    An LEFT frontal extra-axial postsurgical fluid collection is present, without change.    Previously demonstrated RIGHT frontal enhancing mass is no longer present.                NORBERTO CLINE MD; Attending Radiologist  This document has been electronically signed. Nov 1 2020 12:23PM    < end of copied text >      < from: EEG w/ Video Each 12-26 Hours, Unmonitored (10.31.20 @ 12:40) >     EXAM:  VEEG EA 12-26 HR UNMONITORED        PROCEDURE DATE:  10/31/2020        INTERPRETATION:  Exam Performed on:  The digital V-EEG (video-encephalogram) was recorded using [employer] Neu[facility] System.  The standard 10-20 System based on American clinical Neurophysiology guidelines was applied and the anterior temporal T1, T2 electrodes were added when needed.  The encephalography was acquired using a minimum number of 19 and 23 channels respectively for pediatrics and adults. Raw data was reviewed in detail.    History  80 year old man with brain metastasis and focal seizures    Diagnosis and purpose of monitoring:  Characterization and assessment    Medication    Medication  Dosage  Date  Comment  Levetiracetam  -  2020/10/30  -    Day#1: 10/29/2020 (10:07:20) to 10/30/2020 (08:34:43):      Awake:  The recording during the wakefulness consists of a symmetrical and well-organized background and posterior dominant rhythm in the range of 7-8 Hz, which is reactive to eye opening and eye closure and change in the level of alertness.    Asleep:  Different stages of sleep were presented well.  Stage II of non-REM sleep was characterized by the presence of symmetrical and well-defined sleep spindles and vertex sharp waves.  The deeperstages of sleep were identified by the presence of low theta and delta range activity seen diffusely over both hemispheres and more prominently from the frontal and central derivations.  All stages captured and symmetric.    Focal Slowing:  Moderate -severe left hemispheric slowing  Mild right hemispheric slowing      Generalized Slowing:  mild    Interictal Activity  No epileptiform activity    Activation and Provocation Procedures:  Not performed      Events:  No events captured        Day #2: 10/30/2020 (09:00:00) to 10/31/2020 (09:00:00)        Awake:  The recording during the wakefulness consists of a symmetrical and well-organized background and posterior dominant rhythm in the range of 7-8 Hz, which is reactive to eye opening and eye closure and change in the level of alertness.    Asleep:  Different stages of sleep were presented well.  Stage II of non-REM sleep was characterized by the presence of symmetrical and well-defined sleep spindles and vertex sharp waves.  The deeper stages of sleep were identified by the presence of low theta and delta range activity seen diffusely over both hemispheres and more prominently from the frontal and central derivations.  All stages captured and symmetric.    Focal Slowing:  Moderate -severe left hemispheric slowing  Mild right hemispheric slowing      Generalized Slowing:  mild    Interictal Activity  No epileptiform activity    Activation and Provocation Procedures:  Not performed      Events:  No events captured        Impression  This is an abnormal Video EEG study with mild generalized background slowing and moderately severe left hemispheric and mild right hemisphere focal slowing, consistent with a structural lesion in that area. There is no epileptiform activity or clinical/subclinical seizure activity noted in the study.              FAVIO CAMPOS MD; Attending Neurologist  This document has been electronically signed. Oct 31 2020 11:13AM    < end of copied text >

## 2020-11-02 NOTE — DISCHARGE NOTE PROVIDER - NSDCCPCAREPLAN_GEN_ALL_CORE_FT
PRINCIPAL DISCHARGE DIAGNOSIS  Diagnosis: Status epilepticus  Assessment and Plan of Treatment: you had a seizure which we treated with levitiracetam. Please follow up with your cancer doctor and primary care doctor in 1 week.      SECONDARY DISCHARGE DIAGNOSES  Diagnosis: Brain bleed  Assessment and Plan of Treatment:     Diagnosis: Brain metastasis  Assessment and Plan of Treatment:     Diagnosis: Lung cancer  Assessment and Plan of Treatment:     Diagnosis: Hypotension  Assessment and Plan of Treatment:

## 2020-11-02 NOTE — PROGRESS NOTE ADULT - SUBJECTIVE AND OBJECTIVE BOX
EMANUEL CAMPBELL  80y  Male  ***My note supersedes ALL resident notes that I sign.  My corrections for their notes are in my note.***    I can be reached directly on [employer] [id number]. My office number is [phone]. My personal cell number is [phone].    Catia Int: [id number]    INTERVAL EVENTS: Here for f/u of sz 2/2 brain met sx. Pt w/ no further sz. He feels OK. He can walk w/ walker. His wife is willing to take him home. He plans to f/u w/ Dr Wasserman in [facility] (Onc at [facility]). Pt can eat/drink fine.    T(F): 96.5 (11-02-20 @ 12:31), Max: 96.5 (11-02-20 @ 12:31)  HR: 77 (11-02-20 @ 12:31) (51 - 77)  BP: 140/72 (11-02-20 @ 12:31) (130/71 - 143/71)  RR: 18 (11-02-20 @ 12:31) (18 - 18)  SpO2: --    Gen: NAD  HEENT: PERRL, EOMI, mouth clr, nose clr  Neck: no nodes, no JVD, thyroid nl  lungs: clr  hrt: s1 s2 rrr no murmur  abd: soft, NT/ND, no HS megaly  ext: no edema, no c/c  neuro: aa ox3, cn intact, can move all 4 ext    LABS:                      11.7    (    94.6   12.58 )-----------( ---------      251      ( 02 Nov 2020 06:26 )             38.7    (    17.2     WBC Count: 12.58 K/uL (11-02-20 @ 06:26) - on steroids  WBC Count: 13.00 K/uL (11-01-20 @ 15:35)  WBC Count: 21.88 K/uL (10-30-20 @ 04:45)  WBC Count: 20.73 K/uL (10-29-20 @ 04:25)      136   (   103   (   115      11-02-20 @ 06:26  ----------------------               4.6   (   19   (   26     AG = 14                        -----                        0.8  Ca  7.8   Mg  2.5    P   --     141   (   107   (   143      11-01-20 @ 15:35  ----------------------               4.3   (   24   (   27                             -----                        0.8  Ca  8.1   Mg  2.5    P   --     LFT  5.6  (  0.3  (  26       11-02-20 @ 06:26  -------------------------  3.3  (  75  (  18    CAPILLARY BLOOD GLUCOSE  POCT Blood Glucose.: 209 (11-02-20 @ 09:02)  POCT Blood Glucose.: 128 (11-02-20 @ 04:19)  POCT Blood Glucose.: 162 (11-01-20 @ 21:01)  POCT Blood Glucose.: 127 (11-01-20 @ 16:49)  POCT Blood Glucose.: 191 (11-01-20 @ 11:20)  POCT Blood Glucose.: 168 (11-01-20 @ 07:56)  POCT Blood Glucose.: 125 (10-31-20 @ 07:00)    RADIOLOGY & ADDITIONAL TESTS:  < from: MR Head w/wo IV Cont (11.01.20 @ 09:42) >    IMPRESSION:  Decreased edema within the LEFT frontal lobe. There is enhancement lining the surgical resection cavity with no definite masslike enhancement, likely representing postoperative change. No definite evidence of recurrence.    A LEFT frontal extra-axial postsurgical fluid collection is present, without change.    Previously demonstrated RIGHT frontal enhancing mass is no longer present.    < end of copied text >    < from: Xray Chest 1 View- PORTABLE-Routine (Xray Chest 1 View- PORTABLE-Routine in AM.) (11.01.20 @ 08:54) >  Impression:    Left hilar mass, unchanged.    Right-sided Port-A-Cath.    < end of copied text >    < from: VA Duplex Lower Ext Vein Scan, Bilat (10.29.20 @ 21:50) >  Impression:    No evidence of deep venous thrombosis in the bilateral lower extremities.  Superficial thrombosis of the left soleal vein.    < end of copied text >      MEDICATIONS:    chlorhexidine 4% Liquid 1 Application(s) Topical <User Schedule>  dexAMETHasone     Tablet 4 milliGRAM(s) Oral every 6 hours  enoxaparin Injectable 40 milliGRAM(s) SubCutaneous daily  levETIRAcetam 1500 milliGRAM(s) Oral two times a day  pantoprazole    Tablet 40 milliGRAM(s) Oral before breakfast

## 2020-11-02 NOTE — PHYSICAL THERAPY INITIAL EVALUATION ADULT - GENERAL OBSERVATIONS, REHAB EVAL
[id number] noon Chart reviewed. Pt. seen semirecline in bed , in No apparent distress,. IV lock, Cantonese  utilized ([id number]). Pt. agreed to activity/therapy.

## 2020-11-02 NOTE — PHYSICAL THERAPY INITIAL EVALUATION ADULT - GAIT DEVIATIONS NOTED, PT EVAL
LE occasionally crosses midline/decreased velocity of limb motion/decreased stride length/decreased step length/decreased dianna

## 2020-11-02 NOTE — CHART NOTE - NSCHARTNOTEFT_GEN_A_CORE
Registered Dietitian Follow-Up     Patient Profile Reviewed                           Yes [x]   No []     Nutrition History Previously Obtained        Yes [x]  No []       Pertinent Subjective Information: Translation completed by Cantonese speaking RD, as per request of pt wife. Pt extubated 10/30, tolerating current diet order per SLP recs. Reports he does not like [employer]ogn[employer] meals, prefers  cuisine, but he knows he has to eat and wife usually at bedside to enforce intake as well. Consumes 75% meals and wife brings Ensure powder and pt drinks 1 Shake per day. Intake sufficient at this time.      Pertinent Medical Interventions:  1. Status Epilepticus; AMS, likely Post-ictal--extubated 10/30, ICU downgrade 10/31.   2. H/O Lung Cancer with Mets to brain; Vasogenic Edema--f/u oncology and palliative recs    3. Metastatic lesions of brain cannot R/O Intraparenchymal hemorrhage--per Neuro: MRI w/ contrast showing no definitive evidence of recurrence of brain mets.  4. Hypermagnesemia--monitor trends      Diet order: Regular     Anthropometrics:  Height (cm): 170.2 (10-29-20 @ 12:46)  Weight (kg): 53.5 (10-28-20 @ 23:15)  BMI (kg/m2): 18.5 (10-29-20 @ 12:46)  IBW:     Daily Weight in k.7 (), Weight in k (10-31), Weight in k.1 (10-30), Weight in k.5 (10-28)  % Weight Change: trends remain stable and within range.  wt outlier, may be bedscale error.     MEDICATIONS  (STANDING):  dexAMETHasone     Tablet 4 milliGRAM(s) Oral every 6 hours  enoxaparin Injectable 40 milliGRAM(s) SubCutaneous daily  levETIRAcetam 1500 milliGRAM(s) Oral two times a day  pantoprazole    Tablet 40 milliGRAM(s) Oral before breakfast    Pertinent Labs: ( @ 06:26): Na 136, BUN 26, Cr 0.8, , K+ 4.6, Mg 2.5, Alk Phos 75, ALT/SGPT 18, AST/SGOT 26    Finger Sticks:  POCT Blood Glucose.: 209 mg/dL ( @ 09:02)  POCT Blood Glucose.: 128 mg/dL ( @ 04:19)  POCT Blood Glucose.: 162 mg/dL ( @ 21:01)  POCT Blood Glucose.: 127 mg/dL ( @ 16:49)    Physical Findings:  - Appearance: AAOx2   - GI function: none reported, LBM    - Tubes: n/a   - Oral/Mouth cavity: SLP 10/31 recs: Regular, thin liquids   - Skin: no edema. stage I pressure ulcer to R buttocks      Nutrition Requirements:  Weight Used: 53.5kg (dosing wt) needs adjusted s/p extubation      Estimated Energy Needs    Adjust [x] (25-30 kcal/day)   Adjusted Energy Recommendations: 5991-4785  kcal/day     Estimated Protein Needs    Adjust [x]  Adjusted Protein Recommendations:   gm/day     Estimated Fluid Needs        Adjust [x]  Adjusted Fluid Recommendations: 1mL/kcal or per LIP      Nutrient Intake: meeting needs with 75% intake and 1 Ensure shake/day      [] Previous Nutrition Diagnosis: inadequate protein-energy intake            [] Ongoing          [x] Resolved    [x] No active nutrition diagnosis identified at this time     Nutrition Intervention: meals and snacks, medical food supplements   Recommend:  1. Continue Regular diet with Ensure 1x/day (wife bringing from home). Add Ensure Enlive q24H if wife not at bedside or bringing supplement.      Goal/Expected Outcome: Pt to continue to consume 75% meals + 1 Ensure/day throughout LOS. Reassess in 7 days.      Indicator/Monitoring: diet order, energy intake, nutrition related labs, body composition, NFPF

## 2020-11-02 NOTE — SWALLOW BEDSIDE ASSESSMENT ADULT - SWALLOW EVAL: DIAGNOSIS
Pt consumed and overtly tolerated 4oz of thin liquids via cup sip and regular textures. No s/s of aspiration/penetration observed.
Swallow function grossly WNL for management of regular solids with thin liquids

## 2020-11-02 NOTE — PHYSICAL THERAPY INITIAL EVALUATION ADULT - PLANNED THERAPY INTERVENTIONS, PT EVAL
Vaccine Information Statement(s) was given today. This has been reviewed, questions answered, and verbal consent given by Parent for injection(s) and administration of Haemophilus Influenza type b (Hib), Influenza (Inactivated), Pediarix, Pneumococcal Conjugate (PCV13) and Rotavirus.    1. Does the patient have a moderate to severe fever?  No  2. Has the patient had a serious reaction to a flu shot before?   No  3. Has the patient ever had [place] Syndrome within 6 weeks of a previous flu shot?  No  4. Does the patient have a serious allergy to eggs?  No  5. Is the patient less that 6 months of age?  No    Patient is eligible to receive the vaccine based on all questions being answered as 'No'.          Patient tolerated without incident. See immunization grid for documentation.   balance training/gait training/transfer training

## 2020-11-02 NOTE — CONSULT NOTE ADULT - ASSESSMENT
Consult Summary    Patient is a 80 yr old Cantonese speaking male with metastatic lung cancer to the brain (dx 2018), s/p resection (2019), on chemotherapy at outside oncologist admitted for AMS/SE, now extubated and downgraded to medical floor.       Morphine Equivalent Daily Dose (MEDD):       ____________ minutes spent discusssing Advance Care Planning.     Recommendations:          Please Call [phone] PRN

## 2020-11-02 NOTE — CHART NOTE - NSCHARTNOTEFT_GEN_A_CORE
-- discharge note     81 yo male, Cantonese speaking, pmh Lung ca (dx 05/2018) with mets to brain s/p resection (09/2019; not on seizure ppx or steroids), radiation 2019, Chemo started 2019  complicated by one episode of unresponsives in 2020 when chemo was changed 04/2020( patient was unable to come to the hospital due to COVID) still on chemo last treatment (Oct 15th) ago, presents to ed with wife. As per wife  patient was talking and  taking walking 30mins everday. Last known to be at baseline 10/27/2020.   As per ems pt was unresponsive and shaking in the right upper arm and right side of face for one hour pta.  In ED pt received 6mg IV ativan and loaded with 1G keppra, not back to baseline,  no tracking, not opening eyes to pain, no convulsions, withdrawing to pain.   As per wife at bedside ( ID [id number] & [id number]) patient woke up from bed at 5am this morning, found in bed, with both UE stiff and close to body. States that  had his eyes open but unresponsive. Reports that at baseline he was doing well, without any focal deficits, A&Ox4, ambulating well, sawing and building things. Wife reports no previous hx of seizure or CVA.    In ED s/p CT head showing R frontal brain mets w/ vasogenic edema and L sided postsurgical changes with possible intraparenchymal hemorrhage with or without underlying metastatic lesion. On examination, p/t s/p 6mg IV ativan, loaded w/ 1 g Keppra, not back to baseline. 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   He was admitted due to AMS due to status epileticus , intubated and monitored in the MICU. Later, he was downgraded to the heme-onc floor. Patient's status epilepticus was resolved with keppra . He would like to go home. He is medically clear for DC . He will need to FU with his oncologist outpatient.

## 2020-11-02 NOTE — PROGRESS NOTE ADULT - ASSESSMENT
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 Patient was intubated and admitted to MICU. Pt. was extubated and downgraded to the medical diamond.     # Status Epilepticus 2/2 post-op sx changes, fluid onel, hx brain mets (which appear to have been removed now); lt side lung ca (prob NSCLC stg 4)  c/w Keppra 1500mg po q12  can decr dexa to 4mg po q8 upon d/c - f/u w/ h/o for further tapering (dexa is cause of residual leukocytosis)  MRI brain - post op changes, no bleeding  no surgical intervention per neurosx  Follow up with oncologist (Dr. VICTOR) at [facility]- ([phone])    # BPH  Flomax and finasteride (home meds) can restart upon d/c    # Underweight - BMI less than 19, nutritional supplements     # Superficial thrombosis of soleal vein  asymp  there was question of bleed on CTH vs no bleed on MRI  would NOT a/c at this time  can f/u dupl w/ h/o in 2 wks and if + for proximal clot, then consider IVC filter    # DVT PPX: Lovenox     # GI ppx: PPI    Dispo: d/c home today  outpt h/o f/u w/ Dr Victor

## 2020-11-02 NOTE — SWALLOW BEDSIDE ASSESSMENT ADULT - NS SPL SWALLOW CLINIC TRIAL FT
tolerated
+ toleration of thin, puree, soft solids and regular solids w/o overt clinical s/s of aspiration

## 2020-11-02 NOTE — PROGRESS NOTE ADULT - REASON FOR ADMISSION
Seizures and Altered  Mental Status

## 2020-11-02 NOTE — PHYSICAL THERAPY INITIAL EVALUATION ADULT - PHYSICAL ASSIST/NONPHYSICAL ASSIST: GAIT, REHAB EVAL
1 person assist/verbal cues 62 year old woman with PMH HTN, history of stomach and thyroid cancer (neither active as per family) spinal lesion (unclear etiology, followed q 1 month with MRI as per family) and paraplegia presents with leg swelling and + DVT on US.

## 2020-11-02 NOTE — DISCHARGE NOTE PROVIDER - CARE PROVIDER_API CALL
EDUARDO VICTOR  [id number]  [address] [place] [phone]  Phone: [phone]  Fax: ([phone]  Established Patient  Follow Up Time: 1 week

## 2020-11-02 NOTE — SWALLOW BEDSIDE ASSESSMENT ADULT - SLP PERTINENT HISTORY OF CURRENT PROBLEM
80 y.o. M adm with chief complaint of seizures and AMS. Pmhx; Lung CA (dx 5/2018) w/ mets to the brain s/p resection (9/2019) s/p radiation  on chemo p/w  GTC c/b  NCSE. Pt intubated  on 10/28 for airway protection . CTH 10/29 Stable left frontal encephalomalacia. Stable appearance of the focal hyperdensity  in the inferior left frontal lobe conforming to the shape of the gyrus.  CXR 10/29 Left perihilar opacity unchanged. No pleural effusion or air leak. MRI  11/1-  Decreased edema within the LEFT frontal lobe.An LEFT frontal extra-axial postsurgical fluid collection is present, without change. Previously demonstrated RIGHT frontal enhancing mass is no longer present.

## 2020-11-17 PROBLEM — C34.90 MALIGNANT NEOPLASM OF UNSPECIFIED PART OF UNSPECIFIED BRONCHUS OR LUNG: Chronic | Status: ACTIVE | Noted: 2020-01-01

## 2020-12-01 PROBLEM — C79.31 BRAIN METASTASES: Status: ACTIVE | Noted: 2019-08-12

## 2021-01-01 ENCOUNTER — TRANSCRIPTION ENCOUNTER (OUTPATIENT)
Age: 81
End: 2021-01-01

## 2021-01-01 ENCOUNTER — INPATIENT (INPATIENT)
Facility: HOSPITAL | Age: 81
LOS: 4 days | End: 2021-02-18
Attending: INTERNAL MEDICINE | Admitting: INTERNAL MEDICINE
Payer: MEDICARE

## 2021-01-01 ENCOUNTER — INPATIENT (INPATIENT)
Facility: HOSPITAL | Age: 81
LOS: 10 days | Discharge: HOME | End: 2021-01-24
Attending: INTERNAL MEDICINE | Admitting: INTERNAL MEDICINE
Payer: MEDICARE

## 2021-01-01 ENCOUNTER — RESULT REVIEW (OUTPATIENT)
Age: 81
End: 2021-01-01

## 2021-01-01 VITALS
RESPIRATION RATE: 18 BRPM | HEART RATE: 62 BPM | DIASTOLIC BLOOD PRESSURE: 74 MMHG | OXYGEN SATURATION: 96 % | HEIGHT: 67 IN | SYSTOLIC BLOOD PRESSURE: 107 MMHG | TEMPERATURE: 96 F

## 2021-01-01 VITALS — SYSTOLIC BLOOD PRESSURE: 131 MMHG | TEMPERATURE: 98 F | DIASTOLIC BLOOD PRESSURE: 68 MMHG | HEART RATE: 94 BPM

## 2021-01-01 VITALS — DIASTOLIC BLOOD PRESSURE: 62 MMHG | HEART RATE: 87 BPM | TEMPERATURE: 99 F | SYSTOLIC BLOOD PRESSURE: 105 MMHG

## 2021-01-01 VITALS
TEMPERATURE: 98 F | HEIGHT: 67 IN | HEART RATE: 134 BPM | OXYGEN SATURATION: 81 % | RESPIRATION RATE: 30 BRPM | SYSTOLIC BLOOD PRESSURE: 132 MMHG | DIASTOLIC BLOOD PRESSURE: 99 MMHG

## 2021-01-01 DIAGNOSIS — R63.6 UNDERWEIGHT: ICD-10-CM

## 2021-01-01 DIAGNOSIS — C79.31 SECONDARY MALIGNANT NEOPLASM OF BRAIN: ICD-10-CM

## 2021-01-01 DIAGNOSIS — G40.909 EPILEPSY, UNSPECIFIED, NOT INTRACTABLE, WITHOUT STATUS EPILEPTICUS: ICD-10-CM

## 2021-01-01 DIAGNOSIS — N39.0 URINARY TRACT INFECTION, SITE NOT SPECIFIED: ICD-10-CM

## 2021-01-01 DIAGNOSIS — N40.1 BENIGN PROSTATIC HYPERPLASIA WITH LOWER URINARY TRACT SYMPTOMS: ICD-10-CM

## 2021-01-01 DIAGNOSIS — I26.99 OTHER PULMONARY EMBOLISM WITHOUT ACUTE COR PULMONALE: ICD-10-CM

## 2021-01-01 DIAGNOSIS — Z98.890 OTHER SPECIFIED POSTPROCEDURAL STATES: Chronic | ICD-10-CM

## 2021-01-01 DIAGNOSIS — Z51.5 ENCOUNTER FOR PALLIATIVE CARE: ICD-10-CM

## 2021-01-01 DIAGNOSIS — G93.41 METABOLIC ENCEPHALOPATHY: ICD-10-CM

## 2021-01-01 DIAGNOSIS — C34.90 MALIGNANT NEOPLASM OF UNSPECIFIED PART OF UNSPECIFIED BRONCHUS OR LUNG: ICD-10-CM

## 2021-01-01 DIAGNOSIS — R06.02 SHORTNESS OF BREATH: ICD-10-CM

## 2021-01-01 DIAGNOSIS — I82.409 ACUTE EMBOLISM AND THROMBOSIS OF UNSPECIFIED DEEP VEINS OF UNSPECIFIED LOWER EXTREMITY: ICD-10-CM

## 2021-01-01 DIAGNOSIS — Z71.89 OTHER SPECIFIED COUNSELING: ICD-10-CM

## 2021-01-01 DIAGNOSIS — I10 ESSENTIAL (PRIMARY) HYPERTENSION: ICD-10-CM

## 2021-01-01 LAB
-  AMPICILLIN: SIGNIFICANT CHANGE UP
-  CIPROFLOXACIN: SIGNIFICANT CHANGE UP
-  LEVOFLOXACIN: SIGNIFICANT CHANGE UP
-  NITROFURANTOIN: SIGNIFICANT CHANGE UP
-  TETRACYCLINE: SIGNIFICANT CHANGE UP
-  VANCOMYCIN: SIGNIFICANT CHANGE UP
ALBUMIN FLD-MCNC: 0.8 G/DL — SIGNIFICANT CHANGE UP
ALBUMIN SERPL ELPH-MCNC: 2 G/DL — LOW (ref 3.5–5.2)
ALBUMIN SERPL ELPH-MCNC: 2.2 G/DL — LOW (ref 3.5–5.2)
ALBUMIN SERPL ELPH-MCNC: 2.4 G/DL — LOW (ref 3.5–5.2)
ALBUMIN SERPL ELPH-MCNC: 2.5 G/DL — LOW (ref 3.5–5.2)
ALBUMIN SERPL ELPH-MCNC: 2.6 G/DL — LOW (ref 3.5–5.2)
ALBUMIN SERPL ELPH-MCNC: 2.8 G/DL — LOW (ref 3.5–5.2)
ALBUMIN SERPL ELPH-MCNC: 2.9 G/DL — LOW (ref 3.5–5.2)
ALBUMIN SERPL ELPH-MCNC: 3.1 G/DL — LOW (ref 3.5–5.2)
ALBUMIN SERPL ELPH-MCNC: 3.1 G/DL — LOW (ref 3.5–5.2)
ALP SERPL-CCNC: 60 U/L — SIGNIFICANT CHANGE UP (ref 30–115)
ALP SERPL-CCNC: 61 U/L — SIGNIFICANT CHANGE UP (ref 30–115)
ALP SERPL-CCNC: 64 U/L — SIGNIFICANT CHANGE UP (ref 30–115)
ALP SERPL-CCNC: 65 U/L — SIGNIFICANT CHANGE UP (ref 30–115)
ALP SERPL-CCNC: 66 U/L — SIGNIFICANT CHANGE UP (ref 30–115)
ALP SERPL-CCNC: 69 U/L — SIGNIFICANT CHANGE UP (ref 30–115)
ALP SERPL-CCNC: 69 U/L — SIGNIFICANT CHANGE UP (ref 30–115)
ALP SERPL-CCNC: 70 U/L — SIGNIFICANT CHANGE UP (ref 30–115)
ALP SERPL-CCNC: 72 U/L — SIGNIFICANT CHANGE UP (ref 30–115)
ALP SERPL-CCNC: 79 U/L — SIGNIFICANT CHANGE UP (ref 30–115)
ALP SERPL-CCNC: 84 U/L — SIGNIFICANT CHANGE UP (ref 30–115)
ALT FLD-CCNC: 10 U/L — SIGNIFICANT CHANGE UP (ref 0–41)
ALT FLD-CCNC: 11 U/L — SIGNIFICANT CHANGE UP (ref 0–41)
ALT FLD-CCNC: 14 U/L — SIGNIFICANT CHANGE UP (ref 0–41)
ALT FLD-CCNC: 16 U/L — SIGNIFICANT CHANGE UP (ref 0–41)
ALT FLD-CCNC: 16 U/L — SIGNIFICANT CHANGE UP (ref 0–41)
ALT FLD-CCNC: 17 U/L — SIGNIFICANT CHANGE UP (ref 0–41)
ALT FLD-CCNC: 20 U/L — SIGNIFICANT CHANGE UP (ref 0–41)
ALT FLD-CCNC: 22 U/L — SIGNIFICANT CHANGE UP (ref 0–41)
ALT FLD-CCNC: 26 U/L — SIGNIFICANT CHANGE UP (ref 0–41)
AMMONIA BLD-MCNC: 24 UMOL/L — SIGNIFICANT CHANGE UP (ref 11–55)
ANION GAP SERPL CALC-SCNC: 10 MMOL/L — SIGNIFICANT CHANGE UP (ref 7–14)
ANION GAP SERPL CALC-SCNC: 10 MMOL/L — SIGNIFICANT CHANGE UP (ref 7–14)
ANION GAP SERPL CALC-SCNC: 11 MMOL/L — SIGNIFICANT CHANGE UP (ref 7–14)
ANION GAP SERPL CALC-SCNC: 11 MMOL/L — SIGNIFICANT CHANGE UP (ref 7–14)
ANION GAP SERPL CALC-SCNC: 12 MMOL/L — SIGNIFICANT CHANGE UP (ref 7–14)
ANION GAP SERPL CALC-SCNC: 12 MMOL/L — SIGNIFICANT CHANGE UP (ref 7–14)
ANION GAP SERPL CALC-SCNC: 13 MMOL/L — SIGNIFICANT CHANGE UP (ref 7–14)
ANION GAP SERPL CALC-SCNC: 13 MMOL/L — SIGNIFICANT CHANGE UP (ref 7–14)
ANION GAP SERPL CALC-SCNC: 14 MMOL/L — SIGNIFICANT CHANGE UP (ref 7–14)
ANION GAP SERPL CALC-SCNC: 7 MMOL/L — SIGNIFICANT CHANGE UP (ref 7–14)
ANION GAP SERPL CALC-SCNC: 9 MMOL/L — SIGNIFICANT CHANGE UP (ref 7–14)
ANION GAP SERPL CALC-SCNC: 9 MMOL/L — SIGNIFICANT CHANGE UP (ref 7–14)
ANISOCYTOSIS BLD QL: SLIGHT — SIGNIFICANT CHANGE UP
ANISOCYTOSIS BLD QL: SLIGHT — SIGNIFICANT CHANGE UP
APPEARANCE UR: CLEAR — SIGNIFICANT CHANGE UP
APPEARANCE UR: CLEAR — SIGNIFICANT CHANGE UP
AST SERPL-CCNC: 16 U/L — SIGNIFICANT CHANGE UP (ref 0–41)
AST SERPL-CCNC: 16 U/L — SIGNIFICANT CHANGE UP (ref 0–41)
AST SERPL-CCNC: 17 U/L — SIGNIFICANT CHANGE UP (ref 0–41)
AST SERPL-CCNC: 20 U/L — SIGNIFICANT CHANGE UP (ref 0–41)
AST SERPL-CCNC: 21 U/L — SIGNIFICANT CHANGE UP (ref 0–41)
AST SERPL-CCNC: 21 U/L — SIGNIFICANT CHANGE UP (ref 0–41)
AST SERPL-CCNC: 23 U/L — SIGNIFICANT CHANGE UP (ref 0–41)
AST SERPL-CCNC: 24 U/L — SIGNIFICANT CHANGE UP (ref 0–41)
AST SERPL-CCNC: 25 U/L — SIGNIFICANT CHANGE UP (ref 0–41)
AST SERPL-CCNC: 25 U/L — SIGNIFICANT CHANGE UP (ref 0–41)
AST SERPL-CCNC: 30 U/L — SIGNIFICANT CHANGE UP (ref 0–41)
B PERT IGG+IGM PNL SER: ABNORMAL
BASE EXCESS BLDV CALC-SCNC: 2.8 MMOL/L — HIGH (ref -2–2)
BASOPHILS # BLD AUTO: 0 K/UL — SIGNIFICANT CHANGE UP (ref 0–0.2)
BASOPHILS # BLD AUTO: 0.02 K/UL — SIGNIFICANT CHANGE UP (ref 0–0.2)
BASOPHILS # BLD AUTO: 0.02 K/UL — SIGNIFICANT CHANGE UP (ref 0–0.2)
BASOPHILS # BLD AUTO: 0.04 K/UL — SIGNIFICANT CHANGE UP (ref 0–0.2)
BASOPHILS # BLD AUTO: 0.04 K/UL — SIGNIFICANT CHANGE UP (ref 0–0.2)
BASOPHILS # BLD AUTO: 0.05 K/UL — SIGNIFICANT CHANGE UP (ref 0–0.2)
BASOPHILS # BLD AUTO: 0.06 K/UL — SIGNIFICANT CHANGE UP (ref 0–0.2)
BASOPHILS # BLD AUTO: 0.08 K/UL — SIGNIFICANT CHANGE UP (ref 0–0.2)
BASOPHILS # BLD AUTO: 0.1 K/UL — SIGNIFICANT CHANGE UP (ref 0–0.2)
BASOPHILS # BLD AUTO: 0.19 K/UL — SIGNIFICANT CHANGE UP (ref 0–0.2)
BASOPHILS NFR BLD AUTO: 0 % — SIGNIFICANT CHANGE UP (ref 0–1)
BASOPHILS NFR BLD AUTO: 0.1 % — SIGNIFICANT CHANGE UP (ref 0–1)
BASOPHILS NFR BLD AUTO: 0.1 % — SIGNIFICANT CHANGE UP (ref 0–1)
BASOPHILS NFR BLD AUTO: 0.2 % — SIGNIFICANT CHANGE UP (ref 0–1)
BASOPHILS NFR BLD AUTO: 0.2 % — SIGNIFICANT CHANGE UP (ref 0–1)
BASOPHILS NFR BLD AUTO: 0.3 % — SIGNIFICANT CHANGE UP (ref 0–1)
BASOPHILS NFR BLD AUTO: 0.3 % — SIGNIFICANT CHANGE UP (ref 0–1)
BASOPHILS NFR BLD AUTO: 0.4 % — SIGNIFICANT CHANGE UP (ref 0–1)
BASOPHILS NFR BLD AUTO: 0.4 % — SIGNIFICANT CHANGE UP (ref 0–1)
BASOPHILS NFR BLD AUTO: 0.8 % — SIGNIFICANT CHANGE UP (ref 0–1)
BASOPHILS NFR BLD AUTO: 0.9 % — SIGNIFICANT CHANGE UP (ref 0–1)
BASOPHILS NFR BLD AUTO: 1.2 % — HIGH (ref 0–1)
BILIRUB SERPL-MCNC: 0.2 MG/DL — SIGNIFICANT CHANGE UP (ref 0.2–1.2)
BILIRUB SERPL-MCNC: 0.2 MG/DL — SIGNIFICANT CHANGE UP (ref 0.2–1.2)
BILIRUB SERPL-MCNC: 0.3 MG/DL — SIGNIFICANT CHANGE UP (ref 0.2–1.2)
BILIRUB SERPL-MCNC: 0.4 MG/DL — SIGNIFICANT CHANGE UP (ref 0.2–1.2)
BILIRUB SERPL-MCNC: 0.6 MG/DL — SIGNIFICANT CHANGE UP (ref 0.2–1.2)
BILIRUB SERPL-MCNC: 0.8 MG/DL — SIGNIFICANT CHANGE UP (ref 0.2–1.2)
BILIRUB UR-MCNC: NEGATIVE — SIGNIFICANT CHANGE UP
BILIRUB UR-MCNC: NEGATIVE — SIGNIFICANT CHANGE UP
BUN SERPL-MCNC: 16 MG/DL — SIGNIFICANT CHANGE UP (ref 10–20)
BUN SERPL-MCNC: 17 MG/DL — SIGNIFICANT CHANGE UP (ref 10–20)
BUN SERPL-MCNC: 17 MG/DL — SIGNIFICANT CHANGE UP (ref 10–20)
BUN SERPL-MCNC: 18 MG/DL — SIGNIFICANT CHANGE UP (ref 10–20)
BUN SERPL-MCNC: 20 MG/DL — SIGNIFICANT CHANGE UP (ref 10–20)
BUN SERPL-MCNC: 20 MG/DL — SIGNIFICANT CHANGE UP (ref 10–20)
BUN SERPL-MCNC: 21 MG/DL — HIGH (ref 10–20)
BUN SERPL-MCNC: 22 MG/DL — HIGH (ref 10–20)
BUN SERPL-MCNC: 24 MG/DL — HIGH (ref 10–20)
BUN SERPL-MCNC: 24 MG/DL — HIGH (ref 10–20)
BUN SERPL-MCNC: 25 MG/DL — HIGH (ref 10–20)
BUN SERPL-MCNC: 27 MG/DL — HIGH (ref 10–20)
BUN SERPL-MCNC: 27 MG/DL — HIGH (ref 10–20)
BUN SERPL-MCNC: 28 MG/DL — HIGH (ref 10–20)
BUN SERPL-MCNC: 39 MG/DL — HIGH (ref 10–20)
CA-I SERPL-SCNC: 1.11 MMOL/L — LOW (ref 1.12–1.3)
CALCIUM SERPL-MCNC: 7.1 MG/DL — LOW (ref 8.5–10.1)
CALCIUM SERPL-MCNC: 7.7 MG/DL — LOW (ref 8.5–10.1)
CALCIUM SERPL-MCNC: 7.8 MG/DL — LOW (ref 8.5–10.1)
CALCIUM SERPL-MCNC: 7.8 MG/DL — LOW (ref 8.5–10.1)
CALCIUM SERPL-MCNC: 7.9 MG/DL — LOW (ref 8.5–10.1)
CALCIUM SERPL-MCNC: 8 MG/DL — LOW (ref 8.5–10.1)
CALCIUM SERPL-MCNC: 8 MG/DL — LOW (ref 8.5–10.1)
CALCIUM SERPL-MCNC: 8.1 MG/DL — LOW (ref 8.5–10.1)
CALCIUM SERPL-MCNC: 8.1 MG/DL — LOW (ref 8.5–10.1)
CALCIUM SERPL-MCNC: 8.2 MG/DL — LOW (ref 8.5–10.1)
CHLORIDE SERPL-SCNC: 100 MMOL/L — SIGNIFICANT CHANGE UP (ref 98–110)
CHLORIDE SERPL-SCNC: 101 MMOL/L — SIGNIFICANT CHANGE UP (ref 98–110)
CHLORIDE SERPL-SCNC: 102 MMOL/L — SIGNIFICANT CHANGE UP (ref 98–110)
CHLORIDE SERPL-SCNC: 102 MMOL/L — SIGNIFICANT CHANGE UP (ref 98–110)
CHLORIDE SERPL-SCNC: 103 MMOL/L — SIGNIFICANT CHANGE UP (ref 98–110)
CHLORIDE SERPL-SCNC: 103 MMOL/L — SIGNIFICANT CHANGE UP (ref 98–110)
CHLORIDE SERPL-SCNC: 104 MMOL/L — SIGNIFICANT CHANGE UP (ref 98–110)
CHLORIDE SERPL-SCNC: 105 MMOL/L — SIGNIFICANT CHANGE UP (ref 98–110)
CHLORIDE SERPL-SCNC: 106 MMOL/L — SIGNIFICANT CHANGE UP (ref 98–110)
CHLORIDE SERPL-SCNC: 107 MMOL/L — SIGNIFICANT CHANGE UP (ref 98–110)
CHLORIDE SERPL-SCNC: 107 MMOL/L — SIGNIFICANT CHANGE UP (ref 98–110)
CHLORIDE SERPL-SCNC: 109 MMOL/L — SIGNIFICANT CHANGE UP (ref 98–110)
CHLORIDE SERPL-SCNC: 109 MMOL/L — SIGNIFICANT CHANGE UP (ref 98–110)
CK MB CFR SERPL CALC: 3.3 NG/ML — SIGNIFICANT CHANGE UP (ref 0.6–6.3)
CK SERPL-CCNC: 25 U/L — SIGNIFICANT CHANGE UP (ref 0–225)
CO2 SERPL-SCNC: 21 MMOL/L — SIGNIFICANT CHANGE UP (ref 17–32)
CO2 SERPL-SCNC: 21 MMOL/L — SIGNIFICANT CHANGE UP (ref 17–32)
CO2 SERPL-SCNC: 22 MMOL/L — SIGNIFICANT CHANGE UP (ref 17–32)
CO2 SERPL-SCNC: 22 MMOL/L — SIGNIFICANT CHANGE UP (ref 17–32)
CO2 SERPL-SCNC: 23 MMOL/L — SIGNIFICANT CHANGE UP (ref 17–32)
CO2 SERPL-SCNC: 24 MMOL/L — SIGNIFICANT CHANGE UP (ref 17–32)
CO2 SERPL-SCNC: 24 MMOL/L — SIGNIFICANT CHANGE UP (ref 17–32)
CO2 SERPL-SCNC: 25 MMOL/L — SIGNIFICANT CHANGE UP (ref 17–32)
CO2 SERPL-SCNC: 25 MMOL/L — SIGNIFICANT CHANGE UP (ref 17–32)
CO2 SERPL-SCNC: 27 MMOL/L — SIGNIFICANT CHANGE UP (ref 17–32)
CO2 SERPL-SCNC: 28 MMOL/L — SIGNIFICANT CHANGE UP (ref 17–32)
COLOR FLD: SIGNIFICANT CHANGE UP
COLOR SPEC: SIGNIFICANT CHANGE UP
COLOR SPEC: SIGNIFICANT CHANGE UP
CREAT SERPL-MCNC: 0.5 MG/DL — LOW (ref 0.7–1.5)
CREAT SERPL-MCNC: 0.5 MG/DL — LOW (ref 0.7–1.5)
CREAT SERPL-MCNC: 0.6 MG/DL — LOW (ref 0.7–1.5)
CREAT SERPL-MCNC: 0.7 MG/DL — SIGNIFICANT CHANGE UP (ref 0.7–1.5)
CREAT SERPL-MCNC: 0.8 MG/DL — SIGNIFICANT CHANGE UP (ref 0.7–1.5)
CREAT SERPL-MCNC: 0.9 MG/DL — SIGNIFICANT CHANGE UP (ref 0.7–1.5)
CULTURE RESULTS: SIGNIFICANT CHANGE UP
D DIMER BLD IA.RAPID-MCNC: 937 NG/ML DDU — HIGH (ref 0–230)
DACRYOCYTES BLD QL SMEAR: SLIGHT — SIGNIFICANT CHANGE UP
DIFF PNL FLD: NEGATIVE — SIGNIFICANT CHANGE UP
DIFF PNL FLD: SIGNIFICANT CHANGE UP
EOSINOPHIL # BLD AUTO: 0 K/UL — SIGNIFICANT CHANGE UP (ref 0–0.7)
EOSINOPHIL # BLD AUTO: 0.01 K/UL — SIGNIFICANT CHANGE UP (ref 0–0.7)
EOSINOPHIL # BLD AUTO: 0.02 K/UL — SIGNIFICANT CHANGE UP (ref 0–0.7)
EOSINOPHIL # BLD AUTO: 0.03 K/UL — SIGNIFICANT CHANGE UP (ref 0–0.7)
EOSINOPHIL # BLD AUTO: 0.19 K/UL — SIGNIFICANT CHANGE UP (ref 0–0.7)
EOSINOPHIL NFR BLD AUTO: 0 % — SIGNIFICANT CHANGE UP (ref 0–8)
EOSINOPHIL NFR BLD AUTO: 0.1 % — SIGNIFICANT CHANGE UP (ref 0–8)
EOSINOPHIL NFR BLD AUTO: 0.2 % — SIGNIFICANT CHANGE UP (ref 0–8)
EOSINOPHIL NFR BLD AUTO: 0.9 % — SIGNIFICANT CHANGE UP (ref 0–8)
FLUID INTAKE SUBSTANCE CLASS: SIGNIFICANT CHANGE UP
FLUID SEGMENTED GRANULOCYTES: SIGNIFICANT CHANGE UP %
GAS PNL BLDV: 140 MMOL/L — SIGNIFICANT CHANGE UP (ref 136–145)
GAS PNL BLDV: SIGNIFICANT CHANGE UP
GIANT PLATELETS BLD QL SMEAR: PRESENT — SIGNIFICANT CHANGE UP
GIANT PLATELETS BLD QL SMEAR: PRESENT — SIGNIFICANT CHANGE UP
GLUCOSE BLDC GLUCOMTR-MCNC: 160 MG/DL — HIGH (ref 70–99)
GLUCOSE BLDC GLUCOMTR-MCNC: 178 MG/DL — HIGH (ref 70–99)
GLUCOSE FLD-MCNC: 120 MG/DL — SIGNIFICANT CHANGE UP
GLUCOSE SERPL-MCNC: 105 MG/DL — HIGH (ref 70–99)
GLUCOSE SERPL-MCNC: 107 MG/DL — HIGH (ref 70–99)
GLUCOSE SERPL-MCNC: 108 MG/DL — HIGH (ref 70–99)
GLUCOSE SERPL-MCNC: 112 MG/DL — HIGH (ref 70–99)
GLUCOSE SERPL-MCNC: 115 MG/DL — HIGH (ref 70–99)
GLUCOSE SERPL-MCNC: 119 MG/DL — HIGH (ref 70–99)
GLUCOSE SERPL-MCNC: 126 MG/DL — HIGH (ref 70–99)
GLUCOSE SERPL-MCNC: 136 MG/DL — HIGH (ref 70–99)
GLUCOSE SERPL-MCNC: 148 MG/DL — HIGH (ref 70–99)
GLUCOSE SERPL-MCNC: 155 MG/DL — HIGH (ref 70–99)
GLUCOSE SERPL-MCNC: 180 MG/DL — HIGH (ref 70–99)
GLUCOSE SERPL-MCNC: 191 MG/DL — HIGH (ref 70–99)
GLUCOSE SERPL-MCNC: 80 MG/DL — SIGNIFICANT CHANGE UP (ref 70–99)
GLUCOSE SERPL-MCNC: 93 MG/DL — SIGNIFICANT CHANGE UP (ref 70–99)
GLUCOSE SERPL-MCNC: 98 MG/DL — SIGNIFICANT CHANGE UP (ref 70–99)
GLUCOSE UR QL: NEGATIVE — SIGNIFICANT CHANGE UP
GLUCOSE UR QL: NEGATIVE — SIGNIFICANT CHANGE UP
GRAM STN FLD: SIGNIFICANT CHANGE UP
HCO3 BLDV-SCNC: 27 MMOL/L — SIGNIFICANT CHANGE UP (ref 22–29)
HCT VFR BLD CALC: 28.5 % — LOW (ref 42–52)
HCT VFR BLD CALC: 29.2 % — LOW (ref 42–52)
HCT VFR BLD CALC: 29.3 % — LOW (ref 42–52)
HCT VFR BLD CALC: 32.4 % — LOW (ref 42–52)
HCT VFR BLD CALC: 32.5 % — LOW (ref 42–52)
HCT VFR BLD CALC: 32.7 % — LOW (ref 42–52)
HCT VFR BLD CALC: 33.9 % — LOW (ref 42–52)
HCT VFR BLD CALC: 34.1 % — LOW (ref 42–52)
HCT VFR BLD CALC: 34.4 % — LOW (ref 42–52)
HCT VFR BLD CALC: 35.8 % — LOW (ref 42–52)
HCT VFR BLD CALC: 35.9 % — LOW (ref 42–52)
HCT VFR BLD CALC: 36.1 % — LOW (ref 42–52)
HCT VFR BLD CALC: 36.1 % — LOW (ref 42–52)
HCT VFR BLD CALC: 36.3 % — LOW (ref 42–52)
HCT VFR BLDA CALC: 18.3 % — LOW (ref 34–44)
HGB BLD CALC-MCNC: 6 G/DL — LOW (ref 14–18)
HGB BLD-MCNC: 10.2 G/DL — LOW (ref 14–18)
HGB BLD-MCNC: 10.8 G/DL — LOW (ref 14–18)
HGB BLD-MCNC: 11.1 G/DL — LOW (ref 14–18)
HGB BLD-MCNC: 11.1 G/DL — LOW (ref 14–18)
HGB BLD-MCNC: 11.4 G/DL — LOW (ref 14–18)
HGB BLD-MCNC: 11.4 G/DL — LOW (ref 14–18)
HGB BLD-MCNC: 11.5 G/DL — LOW (ref 14–18)
HGB BLD-MCNC: 11.5 G/DL — LOW (ref 14–18)
HGB BLD-MCNC: 11.7 G/DL — LOW (ref 14–18)
HGB BLD-MCNC: 9.1 G/DL — LOW (ref 14–18)
HGB BLD-MCNC: 9.2 G/DL — LOW (ref 14–18)
HGB BLD-MCNC: 9.2 G/DL — LOW (ref 14–18)
IMM GRANULOCYTES NFR BLD AUTO: 1.8 % — HIGH (ref 0.1–0.3)
IMM GRANULOCYTES NFR BLD AUTO: 1.9 % — HIGH (ref 0.1–0.3)
IMM GRANULOCYTES NFR BLD AUTO: 1.9 % — HIGH (ref 0.1–0.3)
IMM GRANULOCYTES NFR BLD AUTO: 12.3 % — HIGH (ref 0.1–0.3)
IMM GRANULOCYTES NFR BLD AUTO: 13.8 % — HIGH (ref 0.1–0.3)
IMM GRANULOCYTES NFR BLD AUTO: 2.2 % — HIGH (ref 0.1–0.3)
IMM GRANULOCYTES NFR BLD AUTO: 2.7 % — HIGH (ref 0.1–0.3)
IMM GRANULOCYTES NFR BLD AUTO: 2.8 % — HIGH (ref 0.1–0.3)
IMM GRANULOCYTES NFR BLD AUTO: 4.1 % — HIGH (ref 0.1–0.3)
IMM GRANULOCYTES NFR BLD AUTO: 5.5 % — HIGH (ref 0.1–0.3)
IMM GRANULOCYTES NFR BLD AUTO: 7 % — HIGH (ref 0.1–0.3)
KETONES UR-MCNC: NEGATIVE — SIGNIFICANT CHANGE UP
KETONES UR-MCNC: SIGNIFICANT CHANGE UP
LACTATE BLDV-MCNC: 3.8 MMOL/L — HIGH (ref 0.5–1.6)
LACTATE SERPL-SCNC: 1.9 MMOL/L — SIGNIFICANT CHANGE UP (ref 0.7–2)
LDH SERPL L TO P-CCNC: 1206 U/L — SIGNIFICANT CHANGE UP
LDH SERPL L TO P-CCNC: 620 U/L — HIGH (ref 50–242)
LEUKOCYTE ESTERASE UR-ACNC: NEGATIVE — SIGNIFICANT CHANGE UP
LEUKOCYTE ESTERASE UR-ACNC: NEGATIVE — SIGNIFICANT CHANGE UP
LEVETIRACETAM SERPL-MCNC: 57.4 UG/ML — HIGH (ref 10–40)
LIDOCAIN IGE QN: 48 U/L — SIGNIFICANT CHANGE UP (ref 7–60)
LYMPHOCYTES # BLD AUTO: 0.19 K/UL — LOW (ref 1.2–3.4)
LYMPHOCYTES # BLD AUTO: 0.64 K/UL — LOW (ref 1.2–3.4)
LYMPHOCYTES # BLD AUTO: 0.72 K/UL — LOW (ref 1.2–3.4)
LYMPHOCYTES # BLD AUTO: 0.78 K/UL — LOW (ref 1.2–3.4)
LYMPHOCYTES # BLD AUTO: 0.9 % — LOW (ref 20.5–51.1)
LYMPHOCYTES # BLD AUTO: 0.92 K/UL — LOW (ref 1.2–3.4)
LYMPHOCYTES # BLD AUTO: 0.95 K/UL — LOW (ref 1.2–3.4)
LYMPHOCYTES # BLD AUTO: 0.96 K/UL — LOW (ref 1.2–3.4)
LYMPHOCYTES # BLD AUTO: 0.99 K/UL — LOW (ref 1.2–3.4)
LYMPHOCYTES # BLD AUTO: 1.01 K/UL — LOW (ref 1.2–3.4)
LYMPHOCYTES # BLD AUTO: 1.09 K/UL — LOW (ref 1.2–3.4)
LYMPHOCYTES # BLD AUTO: 1.45 K/UL — SIGNIFICANT CHANGE UP (ref 1.2–3.4)
LYMPHOCYTES # BLD AUTO: 1.69 K/UL — SIGNIFICANT CHANGE UP (ref 1.2–3.4)
LYMPHOCYTES # BLD AUTO: 12.7 % — LOW (ref 20.5–51.1)
LYMPHOCYTES # BLD AUTO: 14.6 % — LOW (ref 20.5–51.1)
LYMPHOCYTES # BLD AUTO: 2.03 K/UL — SIGNIFICANT CHANGE UP (ref 1.2–3.4)
LYMPHOCYTES # BLD AUTO: 2.04 K/UL — SIGNIFICANT CHANGE UP (ref 1.2–3.4)
LYMPHOCYTES # BLD AUTO: 3.5 % — LOW (ref 20.5–51.1)
LYMPHOCYTES # BLD AUTO: 4.5 % — LOW (ref 20.5–51.1)
LYMPHOCYTES # BLD AUTO: 4.6 % — LOW (ref 20.5–51.1)
LYMPHOCYTES # BLD AUTO: 5.4 % — LOW (ref 20.5–51.1)
LYMPHOCYTES # BLD AUTO: 5.5 % — LOW (ref 20.5–51.1)
LYMPHOCYTES # BLD AUTO: 5.6 % — LOW (ref 20.5–51.1)
LYMPHOCYTES # BLD AUTO: 7.5 % — LOW (ref 20.5–51.1)
LYMPHOCYTES # BLD AUTO: 7.9 % — LOW (ref 20.5–51.1)
LYMPHOCYTES # BLD AUTO: 8.8 % — LOW (ref 20.5–51.1)
LYMPHOCYTES # BLD AUTO: 9.4 % — LOW (ref 20.5–51.1)
LYMPHOCYTES # BLD AUTO: 9.6 % — LOW (ref 20.5–51.1)
LYMPHOCYTES # FLD: SIGNIFICANT CHANGE UP
MAGNESIUM SERPL-MCNC: 1.8 MG/DL — SIGNIFICANT CHANGE UP (ref 1.8–2.4)
MAGNESIUM SERPL-MCNC: 1.8 MG/DL — SIGNIFICANT CHANGE UP (ref 1.8–2.4)
MAGNESIUM SERPL-MCNC: 1.9 MG/DL — SIGNIFICANT CHANGE UP (ref 1.8–2.4)
MAGNESIUM SERPL-MCNC: 2 MG/DL — SIGNIFICANT CHANGE UP (ref 1.8–2.4)
MAGNESIUM SERPL-MCNC: 2 MG/DL — SIGNIFICANT CHANGE UP (ref 1.8–2.4)
MAGNESIUM SERPL-MCNC: 2.1 MG/DL — SIGNIFICANT CHANGE UP (ref 1.8–2.4)
MAGNESIUM SERPL-MCNC: 2.2 MG/DL — SIGNIFICANT CHANGE UP (ref 1.8–2.4)
MAGNESIUM SERPL-MCNC: 2.3 MG/DL — SIGNIFICANT CHANGE UP (ref 1.8–2.4)
MANUAL SMEAR VERIFICATION: SIGNIFICANT CHANGE UP
MANUAL SMEAR VERIFICATION: SIGNIFICANT CHANGE UP
MCHC RBC-ENTMCNC: 29.4 PG — SIGNIFICANT CHANGE UP (ref 27–31)
MCHC RBC-ENTMCNC: 29.6 PG — SIGNIFICANT CHANGE UP (ref 27–31)
MCHC RBC-ENTMCNC: 29.8 PG — SIGNIFICANT CHANGE UP (ref 27–31)
MCHC RBC-ENTMCNC: 29.8 PG — SIGNIFICANT CHANGE UP (ref 27–31)
MCHC RBC-ENTMCNC: 29.9 PG — SIGNIFICANT CHANGE UP (ref 27–31)
MCHC RBC-ENTMCNC: 30 PG — SIGNIFICANT CHANGE UP (ref 27–31)
MCHC RBC-ENTMCNC: 30.2 PG — SIGNIFICANT CHANGE UP (ref 27–31)
MCHC RBC-ENTMCNC: 30.3 PG — SIGNIFICANT CHANGE UP (ref 27–31)
MCHC RBC-ENTMCNC: 30.5 PG — SIGNIFICANT CHANGE UP (ref 27–31)
MCHC RBC-ENTMCNC: 30.6 PG — SIGNIFICANT CHANGE UP (ref 27–31)
MCHC RBC-ENTMCNC: 31.2 G/DL — LOW (ref 32–37)
MCHC RBC-ENTMCNC: 31.2 G/DL — LOW (ref 32–37)
MCHC RBC-ENTMCNC: 31.4 G/DL — LOW (ref 32–37)
MCHC RBC-ENTMCNC: 31.4 G/DL — LOW (ref 32–37)
MCHC RBC-ENTMCNC: 31.5 G/DL — LOW (ref 32–37)
MCHC RBC-ENTMCNC: 31.6 G/DL — LOW (ref 32–37)
MCHC RBC-ENTMCNC: 31.7 G/DL — LOW (ref 32–37)
MCHC RBC-ENTMCNC: 31.8 G/DL — LOW (ref 32–37)
MCHC RBC-ENTMCNC: 31.9 G/DL — LOW (ref 32–37)
MCHC RBC-ENTMCNC: 32.1 G/DL — SIGNIFICANT CHANGE UP (ref 32–37)
MCHC RBC-ENTMCNC: 32.2 G/DL — SIGNIFICANT CHANGE UP (ref 32–37)
MCHC RBC-ENTMCNC: 32.3 G/DL — SIGNIFICANT CHANGE UP (ref 32–37)
MCHC RBC-ENTMCNC: 32.3 G/DL — SIGNIFICANT CHANGE UP (ref 32–37)
MCHC RBC-ENTMCNC: 32.7 G/DL — SIGNIFICANT CHANGE UP (ref 32–37)
MCV RBC AUTO: 92.4 FL — SIGNIFICANT CHANGE UP (ref 80–94)
MCV RBC AUTO: 93.6 FL — SIGNIFICANT CHANGE UP (ref 80–94)
MCV RBC AUTO: 93.8 FL — SIGNIFICANT CHANGE UP (ref 80–94)
MCV RBC AUTO: 93.8 FL — SIGNIFICANT CHANGE UP (ref 80–94)
MCV RBC AUTO: 93.9 FL — SIGNIFICANT CHANGE UP (ref 80–94)
MCV RBC AUTO: 94 FL — SIGNIFICANT CHANGE UP (ref 80–94)
MCV RBC AUTO: 94.5 FL — HIGH (ref 80–94)
MCV RBC AUTO: 94.8 FL — HIGH (ref 80–94)
MCV RBC AUTO: 94.8 FL — HIGH (ref 80–94)
MCV RBC AUTO: 95 FL — HIGH (ref 80–94)
MCV RBC AUTO: 95 FL — HIGH (ref 80–94)
MCV RBC AUTO: 96 FL — HIGH (ref 80–94)
MCV RBC AUTO: 96.1 FL — HIGH (ref 80–94)
MCV RBC AUTO: 97 FL — HIGH (ref 80–94)
METHOD TYPE: SIGNIFICANT CHANGE UP
MICROCYTES BLD QL: SLIGHT — SIGNIFICANT CHANGE UP
MICROCYTES BLD QL: SLIGHT — SIGNIFICANT CHANGE UP
MONOCYTES # BLD AUTO: 0.19 K/UL — SIGNIFICANT CHANGE UP (ref 0.1–0.6)
MONOCYTES # BLD AUTO: 0.2 K/UL — SIGNIFICANT CHANGE UP (ref 0.1–0.6)
MONOCYTES # BLD AUTO: 0.31 K/UL — SIGNIFICANT CHANGE UP (ref 0.1–0.6)
MONOCYTES # BLD AUTO: 0.37 K/UL — SIGNIFICANT CHANGE UP (ref 0.1–0.6)
MONOCYTES # BLD AUTO: 0.39 K/UL — SIGNIFICANT CHANGE UP (ref 0.1–0.6)
MONOCYTES # BLD AUTO: 0.48 K/UL — SIGNIFICANT CHANGE UP (ref 0.1–0.6)
MONOCYTES # BLD AUTO: 0.49 K/UL — SIGNIFICANT CHANGE UP (ref 0.1–0.6)
MONOCYTES # BLD AUTO: 0.58 K/UL — SIGNIFICANT CHANGE UP (ref 0.1–0.6)
MONOCYTES # BLD AUTO: 0.66 K/UL — HIGH (ref 0.1–0.6)
MONOCYTES # BLD AUTO: 0.75 K/UL — HIGH (ref 0.1–0.6)
MONOCYTES # BLD AUTO: 0.75 K/UL — HIGH (ref 0.1–0.6)
MONOCYTES # BLD AUTO: 0.78 K/UL — HIGH (ref 0.1–0.6)
MONOCYTES # BLD AUTO: 1 K/UL — HIGH (ref 0.1–0.6)
MONOCYTES # BLD AUTO: 1.59 K/UL — HIGH (ref 0.1–0.6)
MONOCYTES NFR BLD AUTO: 1.1 % — LOW (ref 1.7–9.3)
MONOCYTES NFR BLD AUTO: 1.1 % — LOW (ref 1.7–9.3)
MONOCYTES NFR BLD AUTO: 1.5 % — LOW (ref 1.7–9.3)
MONOCYTES NFR BLD AUTO: 2 % — SIGNIFICANT CHANGE UP (ref 1.7–9.3)
MONOCYTES NFR BLD AUTO: 2.6 % — SIGNIFICANT CHANGE UP (ref 1.7–9.3)
MONOCYTES NFR BLD AUTO: 2.8 % — SIGNIFICANT CHANGE UP (ref 1.7–9.3)
MONOCYTES NFR BLD AUTO: 3.5 % — SIGNIFICANT CHANGE UP (ref 1.7–9.3)
MONOCYTES NFR BLD AUTO: 3.5 % — SIGNIFICANT CHANGE UP (ref 1.7–9.3)
MONOCYTES NFR BLD AUTO: 4 % — SIGNIFICANT CHANGE UP (ref 1.7–9.3)
MONOCYTES NFR BLD AUTO: 4.5 % — SIGNIFICANT CHANGE UP (ref 1.7–9.3)
MONOCYTES NFR BLD AUTO: 4.9 % — SIGNIFICANT CHANGE UP (ref 1.7–9.3)
MONOCYTES NFR BLD AUTO: 7.4 % — SIGNIFICANT CHANGE UP (ref 1.7–9.3)
MONOCYTES NFR BLD AUTO: 7.8 % — SIGNIFICANT CHANGE UP (ref 1.7–9.3)
MONOCYTES NFR BLD AUTO: 9.1 % — SIGNIFICANT CHANGE UP (ref 1.7–9.3)
MONOS+MACROS # FLD: SIGNIFICANT CHANGE UP %
MRSA PCR RESULT.: NEGATIVE — SIGNIFICANT CHANGE UP
MYELOCYTES NFR BLD: 4.4 % — HIGH (ref 0–0)
NEUTROPHILS # BLD AUTO: 10.05 K/UL — HIGH (ref 1.4–6.5)
NEUTROPHILS # BLD AUTO: 11.27 K/UL — HIGH (ref 1.4–6.5)
NEUTROPHILS # BLD AUTO: 11.54 K/UL — HIGH (ref 1.4–6.5)
NEUTROPHILS # BLD AUTO: 13.37 K/UL — HIGH (ref 1.4–6.5)
NEUTROPHILS # BLD AUTO: 15.3 K/UL — HIGH (ref 1.4–6.5)
NEUTROPHILS # BLD AUTO: 15.97 K/UL — HIGH (ref 1.4–6.5)
NEUTROPHILS # BLD AUTO: 16.07 K/UL — HIGH (ref 1.4–6.5)
NEUTROPHILS # BLD AUTO: 16.48 K/UL — HIGH (ref 1.4–6.5)
NEUTROPHILS # BLD AUTO: 16.6 K/UL — HIGH (ref 1.4–6.5)
NEUTROPHILS # BLD AUTO: 17.29 K/UL — HIGH (ref 1.4–6.5)
NEUTROPHILS # BLD AUTO: 18.8 K/UL — HIGH (ref 1.4–6.5)
NEUTROPHILS # BLD AUTO: 19.38 K/UL — HIGH (ref 1.4–6.5)
NEUTROPHILS # BLD AUTO: 6.06 K/UL — SIGNIFICANT CHANGE UP (ref 1.4–6.5)
NEUTROPHILS # BLD AUTO: 7.2 K/UL — HIGH (ref 1.4–6.5)
NEUTROPHILS NFR BLD AUTO: 70.3 % — SIGNIFICANT CHANGE UP (ref 42.2–75.2)
NEUTROPHILS NFR BLD AUTO: 70.7 % — SIGNIFICANT CHANGE UP (ref 42.2–75.2)
NEUTROPHILS NFR BLD AUTO: 74.1 % — SIGNIFICANT CHANGE UP (ref 42.2–75.2)
NEUTROPHILS NFR BLD AUTO: 74.8 % — SIGNIFICANT CHANGE UP (ref 42.2–75.2)
NEUTROPHILS NFR BLD AUTO: 80.4 % — HIGH (ref 42.2–75.2)
NEUTROPHILS NFR BLD AUTO: 81 % — HIGH (ref 42.2–75.2)
NEUTROPHILS NFR BLD AUTO: 83.2 % — HIGH (ref 42.2–75.2)
NEUTROPHILS NFR BLD AUTO: 86.5 % — HIGH (ref 42.2–75.2)
NEUTROPHILS NFR BLD AUTO: 90.2 % — HIGH (ref 42.2–75.2)
NEUTROPHILS NFR BLD AUTO: 90.4 % — HIGH (ref 42.2–75.2)
NEUTROPHILS NFR BLD AUTO: 90.8 % — HIGH (ref 42.2–75.2)
NEUTROPHILS NFR BLD AUTO: 91.1 % — HIGH (ref 42.2–75.2)
NEUTROPHILS NFR BLD AUTO: 91.9 % — HIGH (ref 42.2–75.2)
NEUTROPHILS NFR BLD AUTO: 93 % — HIGH (ref 42.2–75.2)
NEUTS BAND # BLD: 0.9 % — SIGNIFICANT CHANGE UP (ref 0–6)
NITRITE UR-MCNC: NEGATIVE — SIGNIFICANT CHANGE UP
NITRITE UR-MCNC: NEGATIVE — SIGNIFICANT CHANGE UP
NRBC # BLD: 0 /100 WBCS — SIGNIFICANT CHANGE UP (ref 0–0)
NRBC # BLD: 1 /100 WBCS — HIGH (ref 0–0)
NRBC # BLD: 1 /100 — HIGH (ref 0–0)
NRBC # BLD: 1 /100 — HIGH (ref 0–0)
NRBC # BLD: 2 /100 WBCS — HIGH (ref 0–0)
NRBC # BLD: 3 /100 WBCS — HIGH (ref 0–0)
NRBC # BLD: SIGNIFICANT CHANGE UP /100 WBCS (ref 0–0)
NRBC # BLD: SIGNIFICANT CHANGE UP /100 WBCS (ref 0–0)
NT-PROBNP SERPL-SCNC: 1003 PG/ML — HIGH (ref 0–300)
ORGANISM # SPEC MICROSCOPIC CNT: SIGNIFICANT CHANGE UP
ORGANISM # SPEC MICROSCOPIC CNT: SIGNIFICANT CHANGE UP
OVALOCYTES BLD QL SMEAR: SLIGHT — SIGNIFICANT CHANGE UP
PCO2 BLDV: 41 MMHG — SIGNIFICANT CHANGE UP (ref 41–51)
PH BLDV: 7.43 — SIGNIFICANT CHANGE UP (ref 7.26–7.43)
PH FLD: 7.7 — SIGNIFICANT CHANGE UP
PH UR: 7 — SIGNIFICANT CHANGE UP (ref 5–8)
PH UR: 7 — SIGNIFICANT CHANGE UP (ref 5–8)
PLAT MORPH BLD: NORMAL — SIGNIFICANT CHANGE UP
PLAT MORPH BLD: NORMAL — SIGNIFICANT CHANGE UP
PLATELET # BLD AUTO: 156 K/UL — SIGNIFICANT CHANGE UP (ref 130–400)
PLATELET # BLD AUTO: 167 K/UL — SIGNIFICANT CHANGE UP (ref 130–400)
PLATELET # BLD AUTO: 172 K/UL — SIGNIFICANT CHANGE UP (ref 130–400)
PLATELET # BLD AUTO: 172 K/UL — SIGNIFICANT CHANGE UP (ref 130–400)
PLATELET # BLD AUTO: 175 K/UL — SIGNIFICANT CHANGE UP (ref 130–400)
PLATELET # BLD AUTO: 183 K/UL — SIGNIFICANT CHANGE UP (ref 130–400)
PLATELET # BLD AUTO: 188 K/UL — SIGNIFICANT CHANGE UP (ref 130–400)
PLATELET # BLD AUTO: 191 K/UL — SIGNIFICANT CHANGE UP (ref 130–400)
PLATELET # BLD AUTO: 193 K/UL — SIGNIFICANT CHANGE UP (ref 130–400)
PLATELET # BLD AUTO: 196 K/UL — SIGNIFICANT CHANGE UP (ref 130–400)
PLATELET # BLD AUTO: 220 K/UL — SIGNIFICANT CHANGE UP (ref 130–400)
PLATELET # BLD AUTO: 245 K/UL — SIGNIFICANT CHANGE UP (ref 130–400)
PLATELET # BLD AUTO: 257 K/UL — SIGNIFICANT CHANGE UP (ref 130–400)
PLATELET # BLD AUTO: 282 K/UL — SIGNIFICANT CHANGE UP (ref 130–400)
PO2 BLDV: 30 MMHG — SIGNIFICANT CHANGE UP (ref 20–40)
POIKILOCYTOSIS BLD QL AUTO: SIGNIFICANT CHANGE UP
POIKILOCYTOSIS BLD QL AUTO: SLIGHT — SIGNIFICANT CHANGE UP
POLYCHROMASIA BLD QL SMEAR: SIGNIFICANT CHANGE UP
POLYCHROMASIA BLD QL SMEAR: SLIGHT — SIGNIFICANT CHANGE UP
POTASSIUM BLDV-SCNC: 3.5 MMOL/L — SIGNIFICANT CHANGE UP (ref 3.3–5.6)
POTASSIUM SERPL-MCNC: 3.2 MMOL/L — LOW (ref 3.5–5)
POTASSIUM SERPL-MCNC: 3.3 MMOL/L — LOW (ref 3.5–5)
POTASSIUM SERPL-MCNC: 3.4 MMOL/L — LOW (ref 3.5–5)
POTASSIUM SERPL-MCNC: 3.4 MMOL/L — LOW (ref 3.5–5)
POTASSIUM SERPL-MCNC: 3.6 MMOL/L — SIGNIFICANT CHANGE UP (ref 3.5–5)
POTASSIUM SERPL-MCNC: 3.6 MMOL/L — SIGNIFICANT CHANGE UP (ref 3.5–5)
POTASSIUM SERPL-MCNC: 3.8 MMOL/L — SIGNIFICANT CHANGE UP (ref 3.5–5)
POTASSIUM SERPL-MCNC: 3.9 MMOL/L — SIGNIFICANT CHANGE UP (ref 3.5–5)
POTASSIUM SERPL-MCNC: 4.1 MMOL/L — SIGNIFICANT CHANGE UP (ref 3.5–5)
POTASSIUM SERPL-MCNC: 4.3 MMOL/L — SIGNIFICANT CHANGE UP (ref 3.5–5)
POTASSIUM SERPL-MCNC: 4.3 MMOL/L — SIGNIFICANT CHANGE UP (ref 3.5–5)
POTASSIUM SERPL-MCNC: 4.5 MMOL/L — SIGNIFICANT CHANGE UP (ref 3.5–5)
POTASSIUM SERPL-MCNC: 4.7 MMOL/L — SIGNIFICANT CHANGE UP (ref 3.5–5)
POTASSIUM SERPL-SCNC: 3.2 MMOL/L — LOW (ref 3.5–5)
POTASSIUM SERPL-SCNC: 3.3 MMOL/L — LOW (ref 3.5–5)
POTASSIUM SERPL-SCNC: 3.4 MMOL/L — LOW (ref 3.5–5)
POTASSIUM SERPL-SCNC: 3.4 MMOL/L — LOW (ref 3.5–5)
POTASSIUM SERPL-SCNC: 3.6 MMOL/L — SIGNIFICANT CHANGE UP (ref 3.5–5)
POTASSIUM SERPL-SCNC: 3.6 MMOL/L — SIGNIFICANT CHANGE UP (ref 3.5–5)
POTASSIUM SERPL-SCNC: 3.8 MMOL/L — SIGNIFICANT CHANGE UP (ref 3.5–5)
POTASSIUM SERPL-SCNC: 3.9 MMOL/L — SIGNIFICANT CHANGE UP (ref 3.5–5)
POTASSIUM SERPL-SCNC: 4.1 MMOL/L — SIGNIFICANT CHANGE UP (ref 3.5–5)
POTASSIUM SERPL-SCNC: 4.3 MMOL/L — SIGNIFICANT CHANGE UP (ref 3.5–5)
POTASSIUM SERPL-SCNC: 4.3 MMOL/L — SIGNIFICANT CHANGE UP (ref 3.5–5)
POTASSIUM SERPL-SCNC: 4.5 MMOL/L — SIGNIFICANT CHANGE UP (ref 3.5–5)
POTASSIUM SERPL-SCNC: 4.7 MMOL/L — SIGNIFICANT CHANGE UP (ref 3.5–5)
PROCALCITONIN SERPL-MCNC: 0.06 NG/ML — SIGNIFICANT CHANGE UP (ref 0.02–0.1)
PROMYELOCYTES # FLD: 1.7 % — HIGH (ref 0–0)
PROT FLD-MCNC: 1.5 G/DL — SIGNIFICANT CHANGE UP
PROT SERPL-MCNC: 3.8 G/DL — LOW (ref 6–8)
PROT SERPL-MCNC: 4.1 G/DL — LOW (ref 6–8)
PROT SERPL-MCNC: 4.2 G/DL — LOW (ref 6–8)
PROT SERPL-MCNC: 4.3 G/DL — LOW (ref 6–8)
PROT SERPL-MCNC: 4.5 G/DL — LOW (ref 6–8)
PROT SERPL-MCNC: 4.7 G/DL — LOW (ref 6–8)
PROT SERPL-MCNC: 4.8 G/DL — LOW (ref 6–8)
PROT SERPL-MCNC: 4.8 G/DL — LOW (ref 6–8)
PROT SERPL-MCNC: 5.3 G/DL — LOW (ref 6–8)
PROT UR-MCNC: NEGATIVE — SIGNIFICANT CHANGE UP
PROT UR-MCNC: SIGNIFICANT CHANGE UP
RBC # BLD: 3.01 M/UL — LOW (ref 4.7–6.1)
RBC # BLD: 3.04 M/UL — LOW (ref 4.7–6.1)
RBC # BLD: 3.13 M/UL — LOW (ref 4.7–6.1)
RBC # BLD: 3.37 M/UL — LOW (ref 4.7–6.1)
RBC # BLD: 3.42 M/UL — LOW (ref 4.7–6.1)
RBC # BLD: 3.45 M/UL — LOW (ref 4.7–6.1)
RBC # BLD: 3.63 M/UL — LOW (ref 4.7–6.1)
RBC # BLD: 3.63 M/UL — LOW (ref 4.7–6.1)
RBC # BLD: 3.67 M/UL — LOW (ref 4.7–6.1)
RBC # BLD: 3.76 M/UL — LOW (ref 4.7–6.1)
RBC # BLD: 3.77 M/UL — LOW (ref 4.7–6.1)
RBC # BLD: 3.8 M/UL — LOW (ref 4.7–6.1)
RBC # BLD: 3.84 M/UL — LOW (ref 4.7–6.1)
RBC # BLD: 3.87 M/UL — LOW (ref 4.7–6.1)
RBC # FLD: 18.2 % — HIGH (ref 11.5–14.5)
RBC # FLD: 18.6 % — HIGH (ref 11.5–14.5)
RBC # FLD: 18.7 % — HIGH (ref 11.5–14.5)
RBC # FLD: 18.7 % — HIGH (ref 11.5–14.5)
RBC # FLD: 18.9 % — HIGH (ref 11.5–14.5)
RBC # FLD: 19 % — HIGH (ref 11.5–14.5)
RBC # FLD: 19 % — HIGH (ref 11.5–14.5)
RBC # FLD: 19.1 % — HIGH (ref 11.5–14.5)
RBC # FLD: 19.2 % — HIGH (ref 11.5–14.5)
RBC # FLD: 19.3 % — HIGH (ref 11.5–14.5)
RBC # FLD: 19.3 % — HIGH (ref 11.5–14.5)
RBC # FLD: 19.7 % — HIGH (ref 11.5–14.5)
RBC # FLD: 19.8 % — HIGH (ref 11.5–14.5)
RBC # FLD: 19.9 % — HIGH (ref 11.5–14.5)
RBC BLD AUTO: ABNORMAL
RBC BLD AUTO: ABNORMAL
RCV VOL RI: HIGH /UL (ref 0–0)
SAO2 % BLDV: 45 % — SIGNIFICANT CHANGE UP
SARS-COV-2 RNA SPEC QL NAA+PROBE: SIGNIFICANT CHANGE UP
SARS-COV-2 RNA SPEC QL NAA+PROBE: SIGNIFICANT CHANGE UP
SODIUM SERPL-SCNC: 133 MMOL/L — LOW (ref 135–146)
SODIUM SERPL-SCNC: 138 MMOL/L — SIGNIFICANT CHANGE UP (ref 135–146)
SODIUM SERPL-SCNC: 139 MMOL/L — SIGNIFICANT CHANGE UP (ref 135–146)
SODIUM SERPL-SCNC: 140 MMOL/L — SIGNIFICANT CHANGE UP (ref 135–146)
SODIUM SERPL-SCNC: 141 MMOL/L — SIGNIFICANT CHANGE UP (ref 135–146)
SODIUM SERPL-SCNC: 141 MMOL/L — SIGNIFICANT CHANGE UP (ref 135–146)
SODIUM SERPL-SCNC: 143 MMOL/L — SIGNIFICANT CHANGE UP (ref 135–146)
SP GR SPEC: 1.01 — SIGNIFICANT CHANGE UP (ref 1.01–1.03)
SP GR SPEC: 1.03 — SIGNIFICANT CHANGE UP (ref 1.01–1.03)
SPECIMEN SOURCE: SIGNIFICANT CHANGE UP
TOTAL NUCLEATED CELL COUNT, BODY FLUID: 480 /UL — SIGNIFICANT CHANGE UP
TROPONIN T SERPL-MCNC: 0.04 NG/ML — CRITICAL HIGH
TROPONIN T SERPL-MCNC: 0.05 NG/ML — CRITICAL HIGH
TROPONIN T SERPL-MCNC: 0.07 NG/ML — CRITICAL HIGH
TROPONIN T SERPL-MCNC: 0.09 NG/ML — CRITICAL HIGH
TUBE TYPE: SIGNIFICANT CHANGE UP
UROBILINOGEN FLD QL: SIGNIFICANT CHANGE UP
UROBILINOGEN FLD QL: SIGNIFICANT CHANGE UP
VALPROATE SERPL-MCNC: 64 UG/ML — SIGNIFICANT CHANGE UP (ref 50–100)
VARIANT LYMPHS # BLD: 1.7 % — SIGNIFICANT CHANGE UP (ref 0–5)
WBC # BLD: 12.1 K/UL — HIGH (ref 4.8–10.8)
WBC # BLD: 13.34 K/UL — HIGH (ref 4.8–10.8)
WBC # BLD: 14 K/UL — HIGH (ref 4.8–10.8)
WBC # BLD: 16.5 K/UL — HIGH (ref 4.8–10.8)
WBC # BLD: 17.38 K/UL — HIGH (ref 4.8–10.8)
WBC # BLD: 17.65 K/UL — HIGH (ref 4.8–10.8)
WBC # BLD: 18.27 K/UL — HIGH (ref 4.8–10.8)
WBC # BLD: 18.41 K/UL — HIGH (ref 4.8–10.8)
WBC # BLD: 20.7 K/UL — HIGH (ref 4.8–10.8)
WBC # BLD: 21.44 K/UL — HIGH (ref 4.8–10.8)
WBC # BLD: 21.63 K/UL — HIGH (ref 4.8–10.8)
WBC # BLD: 22.42 K/UL — HIGH (ref 4.8–10.8)
WBC # BLD: 8.61 K/UL — SIGNIFICANT CHANGE UP (ref 4.8–10.8)
WBC # BLD: 9.62 K/UL — SIGNIFICANT CHANGE UP (ref 4.8–10.8)
WBC # FLD AUTO: 12.1 K/UL — HIGH (ref 4.8–10.8)
WBC # FLD AUTO: 13.34 K/UL — HIGH (ref 4.8–10.8)
WBC # FLD AUTO: 14 K/UL — HIGH (ref 4.8–10.8)
WBC # FLD AUTO: 16.5 K/UL — HIGH (ref 4.8–10.8)
WBC # FLD AUTO: 17.38 K/UL — HIGH (ref 4.8–10.8)
WBC # FLD AUTO: 17.65 K/UL — HIGH (ref 4.8–10.8)
WBC # FLD AUTO: 18.27 K/UL — HIGH (ref 4.8–10.8)
WBC # FLD AUTO: 18.41 K/UL — HIGH (ref 4.8–10.8)
WBC # FLD AUTO: 20.7 K/UL — HIGH (ref 4.8–10.8)
WBC # FLD AUTO: 21.44 K/UL — HIGH (ref 4.8–10.8)
WBC # FLD AUTO: 21.63 K/UL — HIGH (ref 4.8–10.8)
WBC # FLD AUTO: 22.42 K/UL — HIGH (ref 4.8–10.8)
WBC # FLD AUTO: 8.61 K/UL — SIGNIFICANT CHANGE UP (ref 4.8–10.8)
WBC # FLD AUTO: 9.62 K/UL — SIGNIFICANT CHANGE UP (ref 4.8–10.8)

## 2021-01-01 PROCEDURE — 99497 ADVNCD CARE PLAN 30 MIN: CPT | Mod: 25

## 2021-01-01 PROCEDURE — 99232 SBSQ HOSP IP/OBS MODERATE 35: CPT

## 2021-01-01 PROCEDURE — 95720 EEG PHY/QHP EA INCR W/VEEG: CPT

## 2021-01-01 PROCEDURE — 99233 SBSQ HOSP IP/OBS HIGH 50: CPT

## 2021-01-01 PROCEDURE — 93010 ELECTROCARDIOGRAM REPORT: CPT

## 2021-01-01 PROCEDURE — 95819 EEG AWAKE AND ASLEEP: CPT | Mod: 26

## 2021-01-01 PROCEDURE — 99239 HOSP IP/OBS DSCHRG MGMT >30: CPT

## 2021-01-01 PROCEDURE — 99285 EMERGENCY DEPT VISIT HI MDM: CPT | Mod: GC

## 2021-01-01 PROCEDURE — 71045 X-RAY EXAM CHEST 1 VIEW: CPT | Mod: 26

## 2021-01-01 PROCEDURE — 99222 1ST HOSP IP/OBS MODERATE 55: CPT

## 2021-01-01 PROCEDURE — 93970 EXTREMITY STUDY: CPT | Mod: 26

## 2021-01-01 PROCEDURE — 88112 CYTOPATH CELL ENHANCE TECH: CPT | Mod: 26

## 2021-01-01 PROCEDURE — 37191 INS ENDOVAS VENA CAVA FILTR: CPT

## 2021-01-01 PROCEDURE — 99223 1ST HOSP IP/OBS HIGH 75: CPT | Mod: AI

## 2021-01-01 PROCEDURE — 99221 1ST HOSP IP/OBS SF/LOW 40: CPT | Mod: AI

## 2021-01-01 PROCEDURE — 99231 SBSQ HOSP IP/OBS SF/LOW 25: CPT

## 2021-01-01 PROCEDURE — 93306 TTE W/DOPPLER COMPLETE: CPT | Mod: 26

## 2021-01-01 PROCEDURE — 71275 CT ANGIOGRAPHY CHEST: CPT | Mod: 26

## 2021-01-01 PROCEDURE — 93010 ELECTROCARDIOGRAM REPORT: CPT | Mod: 77

## 2021-01-01 PROCEDURE — 99233 SBSQ HOSP IP/OBS HIGH 50: CPT | Mod: GC

## 2021-01-01 PROCEDURE — 88305 TISSUE EXAM BY PATHOLOGIST: CPT | Mod: 26

## 2021-01-01 PROCEDURE — 70450 CT HEAD/BRAIN W/O DYE: CPT | Mod: 26

## 2021-01-01 PROCEDURE — 99291 CRITICAL CARE FIRST HOUR: CPT | Mod: GC

## 2021-01-01 PROCEDURE — 70553 MRI BRAIN STEM W/O & W/DYE: CPT | Mod: 26

## 2021-01-01 RX ORDER — ENOXAPARIN SODIUM 100 MG/ML
53 INJECTION SUBCUTANEOUS EVERY 12 HOURS
Refills: 0 | Status: DISCONTINUED | OUTPATIENT
Start: 2021-01-01 | End: 2021-01-01

## 2021-01-01 RX ORDER — LACOSAMIDE 50 MG/1
1 TABLET ORAL
Qty: 60 | Refills: 0
Start: 2021-01-01 | End: 2021-02-21

## 2021-01-01 RX ORDER — TAMSULOSIN HYDROCHLORIDE 0.4 MG/1
1 CAPSULE ORAL
Qty: 0 | Refills: 0 | DISCHARGE

## 2021-01-01 RX ORDER — LEVETIRACETAM 250 MG/1
1500 TABLET, FILM COATED ORAL
Refills: 0 | Status: DISCONTINUED | OUTPATIENT
Start: 2021-01-01 | End: 2021-01-01

## 2021-01-01 RX ORDER — DEXAMETHASONE 0.5 MG/5ML
2 ELIXIR ORAL EVERY 8 HOURS
Refills: 0 | Status: DISCONTINUED | OUTPATIENT
Start: 2021-01-01 | End: 2021-01-01

## 2021-01-01 RX ORDER — POTASSIUM CHLORIDE 20 MEQ
40 PACKET (EA) ORAL ONCE
Refills: 0 | Status: COMPLETED | OUTPATIENT
Start: 2021-01-01 | End: 2021-01-01

## 2021-01-01 RX ORDER — ENOXAPARIN SODIUM 100 MG/ML
50 INJECTION SUBCUTANEOUS
Refills: 0 | Status: DISCONTINUED | OUTPATIENT
Start: 2021-01-01 | End: 2021-01-01

## 2021-01-01 RX ORDER — FINASTERIDE 5 MG/1
5 TABLET, FILM COATED ORAL DAILY
Refills: 0 | Status: DISCONTINUED | OUTPATIENT
Start: 2021-01-01 | End: 2021-01-01

## 2021-01-01 RX ORDER — DEXAMETHASONE 0.5 MG/5ML
2 ELIXIR ORAL EVERY 12 HOURS
Refills: 0 | Status: DISCONTINUED | OUTPATIENT
Start: 2021-01-01 | End: 2021-01-01

## 2021-01-01 RX ORDER — LEVETIRACETAM 250 MG/1
1000 TABLET, FILM COATED ORAL ONCE
Refills: 0 | Status: COMPLETED | OUTPATIENT
Start: 2021-01-01 | End: 2021-01-01

## 2021-01-01 RX ORDER — TIOTROPIUM BROMIDE 18 UG/1
1 CAPSULE ORAL; RESPIRATORY (INHALATION) EVERY 24 HOURS
Refills: 0 | Status: DISCONTINUED | OUTPATIENT
Start: 2021-01-01 | End: 2021-01-01

## 2021-01-01 RX ORDER — DIVALPROEX SODIUM 500 MG/1
750 TABLET, DELAYED RELEASE ORAL ONCE
Refills: 0 | Status: COMPLETED | OUTPATIENT
Start: 2021-01-01 | End: 2021-01-01

## 2021-01-01 RX ORDER — VALPROIC ACID (AS SODIUM SALT) 250 MG/5ML
550 SOLUTION, ORAL ORAL ONCE
Refills: 0 | Status: COMPLETED | OUTPATIENT
Start: 2021-01-01 | End: 2021-01-01

## 2021-01-01 RX ORDER — LEVETIRACETAM 250 MG/1
1000 TABLET, FILM COATED ORAL
Refills: 0 | Status: DISCONTINUED | OUTPATIENT
Start: 2021-01-01 | End: 2021-01-01

## 2021-01-01 RX ORDER — SODIUM CHLORIDE 9 MG/ML
1000 INJECTION, SOLUTION INTRAVENOUS ONCE
Refills: 0 | Status: COMPLETED | OUTPATIENT
Start: 2021-01-01 | End: 2021-01-01

## 2021-01-01 RX ORDER — LEVETIRACETAM 250 MG/1
750 TABLET, FILM COATED ORAL
Refills: 0 | Status: DISCONTINUED | OUTPATIENT
Start: 2021-01-01 | End: 2021-01-01

## 2021-01-01 RX ORDER — APIXABAN 2.5 MG/1
10 TABLET, FILM COATED ORAL EVERY 12 HOURS
Refills: 0 | Status: DISCONTINUED | OUTPATIENT
Start: 2021-01-01 | End: 2021-01-01

## 2021-01-01 RX ORDER — ENOXAPARIN SODIUM 100 MG/ML
40 INJECTION SUBCUTANEOUS AT BEDTIME
Refills: 0 | Status: DISCONTINUED | OUTPATIENT
Start: 2021-01-01 | End: 2021-01-01

## 2021-01-01 RX ORDER — LACOSAMIDE 50 MG/1
1 TABLET ORAL
Qty: 30 | Refills: 0
Start: 2021-01-01

## 2021-01-01 RX ORDER — APIXABAN 2.5 MG/1
1 TABLET, FILM COATED ORAL
Qty: 28 | Refills: 0
Start: 2021-01-01 | End: 2021-01-01

## 2021-01-01 RX ORDER — LACOSAMIDE 50 MG/1
100 TABLET ORAL
Refills: 0 | Status: DISCONTINUED | OUTPATIENT
Start: 2021-01-01 | End: 2021-01-01

## 2021-01-01 RX ORDER — THEOPHYLLINE ANHYDROUS 200 MG
1 TABLET, EXTENDED RELEASE 12 HR ORAL
Qty: 0 | Refills: 0 | DISCHARGE

## 2021-01-01 RX ORDER — DEXAMETHASONE 0.5 MG/5ML
4 ELIXIR ORAL EVERY 8 HOURS
Refills: 0 | Status: DISCONTINUED | OUTPATIENT
Start: 2021-01-01 | End: 2021-01-01

## 2021-01-01 RX ORDER — PANTOPRAZOLE SODIUM 20 MG/1
1 TABLET, DELAYED RELEASE ORAL
Qty: 0 | Refills: 0 | DISCHARGE

## 2021-01-01 RX ORDER — CARVEDILOL PHOSPHATE 80 MG/1
3.12 CAPSULE, EXTENDED RELEASE ORAL EVERY 12 HOURS
Refills: 0 | Status: DISCONTINUED | OUTPATIENT
Start: 2021-01-01 | End: 2021-01-01

## 2021-01-01 RX ORDER — DIVALPROEX SODIUM 500 MG/1
250 TABLET, DELAYED RELEASE ORAL
Refills: 0 | Status: COMPLETED | OUTPATIENT
Start: 2021-01-01 | End: 2021-01-01

## 2021-01-01 RX ORDER — PANTOPRAZOLE SODIUM 20 MG/1
40 TABLET, DELAYED RELEASE ORAL DAILY
Refills: 0 | Status: DISCONTINUED | OUTPATIENT
Start: 2021-01-01 | End: 2021-01-01

## 2021-01-01 RX ORDER — FINASTERIDE 5 MG/1
1 TABLET, FILM COATED ORAL
Qty: 0 | Refills: 0 | DISCHARGE

## 2021-01-01 RX ORDER — DEXAMETHASONE 0.5 MG/5ML
1 ELIXIR ORAL
Qty: 28 | Refills: 0
Start: 2021-01-01 | End: 2021-01-01

## 2021-01-01 RX ORDER — VANCOMYCIN HCL 1 G
1000 VIAL (EA) INTRAVENOUS EVERY 12 HOURS
Refills: 0 | Status: DISCONTINUED | OUTPATIENT
Start: 2021-01-01 | End: 2021-01-01

## 2021-01-01 RX ORDER — TAMSULOSIN HYDROCHLORIDE 0.4 MG/1
0.4 CAPSULE ORAL AT BEDTIME
Refills: 0 | Status: DISCONTINUED | OUTPATIENT
Start: 2021-01-01 | End: 2021-01-01

## 2021-01-01 RX ORDER — PIPERACILLIN AND TAZOBACTAM 4; .5 G/20ML; G/20ML
3.38 INJECTION, POWDER, LYOPHILIZED, FOR SOLUTION INTRAVENOUS ONCE
Refills: 0 | Status: COMPLETED | OUTPATIENT
Start: 2021-01-01 | End: 2021-01-01

## 2021-01-01 RX ORDER — CEFEPIME 1 G/1
1000 INJECTION, POWDER, FOR SOLUTION INTRAMUSCULAR; INTRAVENOUS ONCE
Refills: 0 | Status: COMPLETED | OUTPATIENT
Start: 2021-01-01 | End: 2021-01-01

## 2021-01-01 RX ORDER — LEVETIRACETAM 250 MG/1
750 TABLET, FILM COATED ORAL EVERY 12 HOURS
Refills: 0 | Status: DISCONTINUED | OUTPATIENT
Start: 2021-01-01 | End: 2021-01-01

## 2021-01-01 RX ORDER — CHLORHEXIDINE GLUCONATE 213 G/1000ML
1 SOLUTION TOPICAL
Refills: 0 | Status: DISCONTINUED | OUTPATIENT
Start: 2021-01-01 | End: 2021-01-01

## 2021-01-01 RX ORDER — APIXABAN 2.5 MG/1
1 TABLET, FILM COATED ORAL
Qty: 100 | Refills: 0
Start: 2021-01-01

## 2021-01-01 RX ORDER — DEXAMETHASONE 0.5 MG/5ML
1 ELIXIR ORAL
Refills: 0 | Status: DISCONTINUED | OUTPATIENT
Start: 2021-01-01 | End: 2021-01-01

## 2021-01-01 RX ORDER — CARVEDILOL PHOSPHATE 80 MG/1
1 CAPSULE, EXTENDED RELEASE ORAL
Qty: 0 | Refills: 0 | DISCHARGE

## 2021-01-01 RX ORDER — DIVALPROEX SODIUM 500 MG/1
500 TABLET, DELAYED RELEASE ORAL EVERY 12 HOURS
Refills: 0 | Status: DISCONTINUED | OUTPATIENT
Start: 2021-01-01 | End: 2021-01-01

## 2021-01-01 RX ORDER — DEXAMETHASONE 0.5 MG/5ML
1 ELIXIR ORAL
Qty: 60 | Refills: 0
Start: 2021-01-01 | End: 2021-02-21

## 2021-01-01 RX ORDER — PANTOPRAZOLE SODIUM 20 MG/1
40 TABLET, DELAYED RELEASE ORAL
Refills: 0 | Status: DISCONTINUED | OUTPATIENT
Start: 2021-01-01 | End: 2021-01-01

## 2021-01-01 RX ORDER — LACOSAMIDE 50 MG/1
50 TABLET ORAL
Refills: 0 | Status: DISCONTINUED | OUTPATIENT
Start: 2021-01-01 | End: 2021-01-01

## 2021-01-01 RX ORDER — IPRATROPIUM/ALBUTEROL SULFATE 18-103MCG
3 AEROSOL WITH ADAPTER (GRAM) INHALATION EVERY 6 HOURS
Refills: 0 | Status: DISCONTINUED | OUTPATIENT
Start: 2021-01-01 | End: 2021-01-01

## 2021-01-01 RX ORDER — BUDESONIDE AND FORMOTEROL FUMARATE DIHYDRATE 160; 4.5 UG/1; UG/1
2 AEROSOL RESPIRATORY (INHALATION)
Refills: 0 | Status: DISCONTINUED | OUTPATIENT
Start: 2021-01-01 | End: 2021-01-01

## 2021-01-01 RX ORDER — MAGNESIUM SULFATE 500 MG/ML
1 VIAL (ML) INJECTION ONCE
Refills: 0 | Status: COMPLETED | OUTPATIENT
Start: 2021-01-01 | End: 2021-01-01

## 2021-01-01 RX ORDER — LACOSAMIDE 50 MG/1
1 TABLET ORAL
Qty: 28 | Refills: 0
Start: 2021-01-01 | End: 2021-01-01

## 2021-01-01 RX ORDER — ONDANSETRON 8 MG/1
4 TABLET, FILM COATED ORAL ONCE
Refills: 0 | Status: COMPLETED | OUTPATIENT
Start: 2021-01-01 | End: 2021-01-01

## 2021-01-01 RX ORDER — DEXAMETHASONE 0.5 MG/5ML
6 ELIXIR ORAL ONCE
Refills: 0 | Status: COMPLETED | OUTPATIENT
Start: 2021-01-01 | End: 2021-01-01

## 2021-01-01 RX ORDER — VANCOMYCIN HCL 1 G
1000 VIAL (EA) INTRAVENOUS ONCE
Refills: 0 | Status: COMPLETED | OUTPATIENT
Start: 2021-01-01 | End: 2021-01-01

## 2021-01-01 RX ORDER — CEFEPIME 1 G/1
1000 INJECTION, POWDER, FOR SOLUTION INTRAMUSCULAR; INTRAVENOUS EVERY 8 HOURS
Refills: 0 | Status: DISCONTINUED | OUTPATIENT
Start: 2021-01-01 | End: 2021-01-01

## 2021-01-01 RX ORDER — LEVETIRACETAM 250 MG/1
1 TABLET, FILM COATED ORAL
Qty: 28 | Refills: 0
Start: 2021-01-01 | End: 2021-01-01

## 2021-01-01 RX ORDER — LEVETIRACETAM 250 MG/1
750 TABLET, FILM COATED ORAL ONCE
Refills: 0 | Status: COMPLETED | OUTPATIENT
Start: 2021-01-01 | End: 2021-01-01

## 2021-01-01 RX ORDER — ACETAMINOPHEN 500 MG
650 TABLET ORAL ONCE
Refills: 0 | Status: COMPLETED | OUTPATIENT
Start: 2021-01-01 | End: 2021-01-01

## 2021-01-01 RX ORDER — LEVETIRACETAM 250 MG/1
1 TABLET, FILM COATED ORAL
Qty: 60 | Refills: 0
Start: 2021-01-01 | End: 2021-02-21

## 2021-01-01 RX ORDER — ALBUTEROL 90 UG/1
2 AEROSOL, METERED ORAL EVERY 6 HOURS
Refills: 0 | Status: DISCONTINUED | OUTPATIENT
Start: 2021-01-01 | End: 2021-01-01

## 2021-01-01 RX ORDER — GUAIFENESIN/DEXTROMETHORPHAN 600MG-30MG
10 TABLET, EXTENDED RELEASE 12 HR ORAL
Qty: 0 | Refills: 0 | DISCHARGE

## 2021-01-01 RX ORDER — PIPERACILLIN AND TAZOBACTAM 4; .5 G/20ML; G/20ML
3.38 INJECTION, POWDER, LYOPHILIZED, FOR SOLUTION INTRAVENOUS EVERY 6 HOURS
Refills: 0 | Status: DISCONTINUED | OUTPATIENT
Start: 2021-01-01 | End: 2021-01-01

## 2021-01-01 RX ADMIN — CARVEDILOL PHOSPHATE 3.12 MILLIGRAM(S): 80 CAPSULE, EXTENDED RELEASE ORAL at 05:59

## 2021-01-01 RX ADMIN — LEVETIRACETAM 750 MILLIGRAM(S): 250 TABLET, FILM COATED ORAL at 05:19

## 2021-01-01 RX ADMIN — FINASTERIDE 5 MILLIGRAM(S): 5 TABLET, FILM COATED ORAL at 12:47

## 2021-01-01 RX ADMIN — LEVETIRACETAM 1500 MILLIGRAM(S): 250 TABLET, FILM COATED ORAL at 17:32

## 2021-01-01 RX ADMIN — Medication 3 MILLILITER(S): at 08:42

## 2021-01-01 RX ADMIN — FINASTERIDE 5 MILLIGRAM(S): 5 TABLET, FILM COATED ORAL at 11:47

## 2021-01-01 RX ADMIN — CHLORHEXIDINE GLUCONATE 1 APPLICATION(S): 213 SOLUTION TOPICAL at 04:51

## 2021-01-01 RX ADMIN — TAMSULOSIN HYDROCHLORIDE 0.4 MILLIGRAM(S): 0.4 CAPSULE ORAL at 22:16

## 2021-01-01 RX ADMIN — Medication 1 MILLIGRAM(S): at 17:48

## 2021-01-01 RX ADMIN — Medication 1 MILLIGRAM(S): at 05:58

## 2021-01-01 RX ADMIN — PIPERACILLIN AND TAZOBACTAM 25 GRAM(S): 4; .5 INJECTION, POWDER, LYOPHILIZED, FOR SOLUTION INTRAVENOUS at 17:17

## 2021-01-01 RX ADMIN — Medication 2 MILLIGRAM(S): at 18:26

## 2021-01-01 RX ADMIN — PIPERACILLIN AND TAZOBACTAM 25 GRAM(S): 4; .5 INJECTION, POWDER, LYOPHILIZED, FOR SOLUTION INTRAVENOUS at 18:55

## 2021-01-01 RX ADMIN — FINASTERIDE 5 MILLIGRAM(S): 5 TABLET, FILM COATED ORAL at 11:51

## 2021-01-01 RX ADMIN — Medication 3 MILLILITER(S): at 08:22

## 2021-01-01 RX ADMIN — ENOXAPARIN SODIUM 40 MILLIGRAM(S): 100 INJECTION SUBCUTANEOUS at 21:14

## 2021-01-01 RX ADMIN — CARVEDILOL PHOSPHATE 3.12 MILLIGRAM(S): 80 CAPSULE, EXTENDED RELEASE ORAL at 18:29

## 2021-01-01 RX ADMIN — LACOSAMIDE 100 MILLIGRAM(S): 50 TABLET ORAL at 05:26

## 2021-01-01 RX ADMIN — LEVETIRACETAM 750 MILLIGRAM(S): 250 TABLET, FILM COATED ORAL at 17:27

## 2021-01-01 RX ADMIN — PIPERACILLIN AND TAZOBACTAM 25 GRAM(S): 4; .5 INJECTION, POWDER, LYOPHILIZED, FOR SOLUTION INTRAVENOUS at 11:12

## 2021-01-01 RX ADMIN — BUDESONIDE AND FORMOTEROL FUMARATE DIHYDRATE 2 PUFF(S): 160; 4.5 AEROSOL RESPIRATORY (INHALATION) at 22:27

## 2021-01-01 RX ADMIN — Medication 4 MILLIGRAM(S): at 04:51

## 2021-01-01 RX ADMIN — LACOSAMIDE 100 MILLIGRAM(S): 50 TABLET ORAL at 05:50

## 2021-01-01 RX ADMIN — Medication 2 MILLIGRAM(S): at 17:22

## 2021-01-01 RX ADMIN — CARVEDILOL PHOSPHATE 3.12 MILLIGRAM(S): 80 CAPSULE, EXTENDED RELEASE ORAL at 17:03

## 2021-01-01 RX ADMIN — CARVEDILOL PHOSPHATE 3.12 MILLIGRAM(S): 80 CAPSULE, EXTENDED RELEASE ORAL at 18:13

## 2021-01-01 RX ADMIN — LEVETIRACETAM 1500 MILLIGRAM(S): 250 TABLET, FILM COATED ORAL at 05:38

## 2021-01-01 RX ADMIN — LEVETIRACETAM 400 MILLIGRAM(S): 250 TABLET, FILM COATED ORAL at 18:11

## 2021-01-01 RX ADMIN — Medication 4 MILLIGRAM(S): at 05:38

## 2021-01-01 RX ADMIN — Medication 4 MILLIGRAM(S): at 22:16

## 2021-01-01 RX ADMIN — Medication 3 MILLILITER(S): at 08:53

## 2021-01-01 RX ADMIN — LEVETIRACETAM 400 MILLIGRAM(S): 250 TABLET, FILM COATED ORAL at 17:02

## 2021-01-01 RX ADMIN — Medication 4 MILLIGRAM(S): at 06:18

## 2021-01-01 RX ADMIN — DIVALPROEX SODIUM 500 MILLIGRAM(S): 500 TABLET, DELAYED RELEASE ORAL at 12:11

## 2021-01-01 RX ADMIN — Medication 250 MILLIGRAM(S): at 06:24

## 2021-01-01 RX ADMIN — CARVEDILOL PHOSPHATE 3.12 MILLIGRAM(S): 80 CAPSULE, EXTENDED RELEASE ORAL at 16:42

## 2021-01-01 RX ADMIN — Medication 3 MILLILITER(S): at 20:28

## 2021-01-01 RX ADMIN — PANTOPRAZOLE SODIUM 40 MILLIGRAM(S): 20 TABLET, DELAYED RELEASE ORAL at 05:17

## 2021-01-01 RX ADMIN — DIVALPROEX SODIUM 500 MILLIGRAM(S): 500 TABLET, DELAYED RELEASE ORAL at 22:10

## 2021-01-01 RX ADMIN — TAMSULOSIN HYDROCHLORIDE 0.4 MILLIGRAM(S): 0.4 CAPSULE ORAL at 22:06

## 2021-01-01 RX ADMIN — Medication 4 MILLIGRAM(S): at 14:17

## 2021-01-01 RX ADMIN — LEVETIRACETAM 1500 MILLIGRAM(S): 250 TABLET, FILM COATED ORAL at 04:51

## 2021-01-01 RX ADMIN — LEVETIRACETAM 400 MILLIGRAM(S): 250 TABLET, FILM COATED ORAL at 05:59

## 2021-01-01 RX ADMIN — LEVETIRACETAM 750 MILLIGRAM(S): 250 TABLET, FILM COATED ORAL at 18:09

## 2021-01-01 RX ADMIN — PIPERACILLIN AND TAZOBACTAM 25 GRAM(S): 4; .5 INJECTION, POWDER, LYOPHILIZED, FOR SOLUTION INTRAVENOUS at 12:03

## 2021-01-01 RX ADMIN — FINASTERIDE 5 MILLIGRAM(S): 5 TABLET, FILM COATED ORAL at 11:12

## 2021-01-01 RX ADMIN — ENOXAPARIN SODIUM 40 MILLIGRAM(S): 100 INJECTION SUBCUTANEOUS at 22:10

## 2021-01-01 RX ADMIN — LEVETIRACETAM 1500 MILLIGRAM(S): 250 TABLET, FILM COATED ORAL at 17:04

## 2021-01-01 RX ADMIN — LACOSAMIDE 100 MILLIGRAM(S): 50 TABLET ORAL at 05:18

## 2021-01-01 RX ADMIN — CHLORHEXIDINE GLUCONATE 1 APPLICATION(S): 213 SOLUTION TOPICAL at 05:17

## 2021-01-01 RX ADMIN — LACOSAMIDE 100 MILLIGRAM(S): 50 TABLET ORAL at 17:52

## 2021-01-01 RX ADMIN — Medication 4 MILLIGRAM(S): at 22:10

## 2021-01-01 RX ADMIN — LEVETIRACETAM 400 MILLIGRAM(S): 250 TABLET, FILM COATED ORAL at 00:52

## 2021-01-01 RX ADMIN — DIVALPROEX SODIUM 500 MILLIGRAM(S): 500 TABLET, DELAYED RELEASE ORAL at 14:17

## 2021-01-01 RX ADMIN — PANTOPRAZOLE SODIUM 40 MILLIGRAM(S): 20 TABLET, DELAYED RELEASE ORAL at 05:38

## 2021-01-01 RX ADMIN — CARVEDILOL PHOSPHATE 3.12 MILLIGRAM(S): 80 CAPSULE, EXTENDED RELEASE ORAL at 05:00

## 2021-01-01 RX ADMIN — PANTOPRAZOLE SODIUM 40 MILLIGRAM(S): 20 TABLET, DELAYED RELEASE ORAL at 14:21

## 2021-01-01 RX ADMIN — CHLORHEXIDINE GLUCONATE 1 APPLICATION(S): 213 SOLUTION TOPICAL at 05:38

## 2021-01-01 RX ADMIN — LEVETIRACETAM 750 MILLIGRAM(S): 250 TABLET, FILM COATED ORAL at 05:47

## 2021-01-01 RX ADMIN — APIXABAN 10 MILLIGRAM(S): 2.5 TABLET, FILM COATED ORAL at 17:13

## 2021-01-01 RX ADMIN — CHLORHEXIDINE GLUCONATE 1 APPLICATION(S): 213 SOLUTION TOPICAL at 05:18

## 2021-01-01 RX ADMIN — CEFEPIME 1000 MILLIGRAM(S): 1 INJECTION, POWDER, FOR SOLUTION INTRAMUSCULAR; INTRAVENOUS at 20:33

## 2021-01-01 RX ADMIN — LEVETIRACETAM 1500 MILLIGRAM(S): 250 TABLET, FILM COATED ORAL at 17:24

## 2021-01-01 RX ADMIN — PANTOPRAZOLE SODIUM 40 MILLIGRAM(S): 20 TABLET, DELAYED RELEASE ORAL at 05:18

## 2021-01-01 RX ADMIN — CHLORHEXIDINE GLUCONATE 1 APPLICATION(S): 213 SOLUTION TOPICAL at 04:59

## 2021-01-01 RX ADMIN — DIVALPROEX SODIUM 500 MILLIGRAM(S): 500 TABLET, DELAYED RELEASE ORAL at 22:05

## 2021-01-01 RX ADMIN — CARVEDILOL PHOSPHATE 3.12 MILLIGRAM(S): 80 CAPSULE, EXTENDED RELEASE ORAL at 17:32

## 2021-01-01 RX ADMIN — LEVETIRACETAM 750 MILLIGRAM(S): 250 TABLET, FILM COATED ORAL at 16:43

## 2021-01-01 RX ADMIN — DIVALPROEX SODIUM 500 MILLIGRAM(S): 500 TABLET, DELAYED RELEASE ORAL at 22:06

## 2021-01-01 RX ADMIN — CARVEDILOL PHOSPHATE 3.12 MILLIGRAM(S): 80 CAPSULE, EXTENDED RELEASE ORAL at 05:27

## 2021-01-01 RX ADMIN — FINASTERIDE 5 MILLIGRAM(S): 5 TABLET, FILM COATED ORAL at 12:12

## 2021-01-01 RX ADMIN — LACOSAMIDE 100 MILLIGRAM(S): 50 TABLET ORAL at 05:19

## 2021-01-01 RX ADMIN — Medication 2 MILLIGRAM(S): at 05:18

## 2021-01-01 RX ADMIN — PIPERACILLIN AND TAZOBACTAM 25 GRAM(S): 4; .5 INJECTION, POWDER, LYOPHILIZED, FOR SOLUTION INTRAVENOUS at 04:29

## 2021-01-01 RX ADMIN — DIVALPROEX SODIUM 500 MILLIGRAM(S): 500 TABLET, DELAYED RELEASE ORAL at 11:51

## 2021-01-01 RX ADMIN — CARVEDILOL PHOSPHATE 3.12 MILLIGRAM(S): 80 CAPSULE, EXTENDED RELEASE ORAL at 17:24

## 2021-01-01 RX ADMIN — TAMSULOSIN HYDROCHLORIDE 0.4 MILLIGRAM(S): 0.4 CAPSULE ORAL at 20:59

## 2021-01-01 RX ADMIN — DIVALPROEX SODIUM 500 MILLIGRAM(S): 500 TABLET, DELAYED RELEASE ORAL at 12:29

## 2021-01-01 RX ADMIN — LEVETIRACETAM 400 MILLIGRAM(S): 250 TABLET, FILM COATED ORAL at 17:16

## 2021-01-01 RX ADMIN — TIOTROPIUM BROMIDE 1 CAPSULE(S): 18 CAPSULE ORAL; RESPIRATORY (INHALATION) at 08:27

## 2021-01-01 RX ADMIN — TAMSULOSIN HYDROCHLORIDE 0.4 MILLIGRAM(S): 0.4 CAPSULE ORAL at 22:10

## 2021-01-01 RX ADMIN — PIPERACILLIN AND TAZOBACTAM 25 GRAM(S): 4; .5 INJECTION, POWDER, LYOPHILIZED, FOR SOLUTION INTRAVENOUS at 23:50

## 2021-01-01 RX ADMIN — Medication 1 MILLIGRAM(S): at 05:49

## 2021-01-01 RX ADMIN — SODIUM CHLORIDE 1000 MILLILITER(S): 9 INJECTION, SOLUTION INTRAVENOUS at 15:31

## 2021-01-01 RX ADMIN — LACOSAMIDE 100 MILLIGRAM(S): 50 TABLET ORAL at 18:09

## 2021-01-01 RX ADMIN — TAMSULOSIN HYDROCHLORIDE 0.4 MILLIGRAM(S): 0.4 CAPSULE ORAL at 20:35

## 2021-01-01 RX ADMIN — DIVALPROEX SODIUM 500 MILLIGRAM(S): 500 TABLET, DELAYED RELEASE ORAL at 22:00

## 2021-01-01 RX ADMIN — Medication 1 MILLIGRAM(S): at 05:27

## 2021-01-01 RX ADMIN — CARVEDILOL PHOSPHATE 3.12 MILLIGRAM(S): 80 CAPSULE, EXTENDED RELEASE ORAL at 05:45

## 2021-01-01 RX ADMIN — ENOXAPARIN SODIUM 40 MILLIGRAM(S): 100 INJECTION SUBCUTANEOUS at 20:35

## 2021-01-01 RX ADMIN — PANTOPRAZOLE SODIUM 40 MILLIGRAM(S): 20 TABLET, DELAYED RELEASE ORAL at 12:45

## 2021-01-01 RX ADMIN — CARVEDILOL PHOSPHATE 3.12 MILLIGRAM(S): 80 CAPSULE, EXTENDED RELEASE ORAL at 05:18

## 2021-01-01 RX ADMIN — CHLORHEXIDINE GLUCONATE 1 APPLICATION(S): 213 SOLUTION TOPICAL at 05:46

## 2021-01-01 RX ADMIN — LACOSAMIDE 100 MILLIGRAM(S): 50 TABLET ORAL at 05:32

## 2021-01-01 RX ADMIN — DIVALPROEX SODIUM 250 MILLIGRAM(S): 500 TABLET, DELAYED RELEASE ORAL at 05:18

## 2021-01-01 RX ADMIN — LACOSAMIDE 100 MILLIGRAM(S): 50 TABLET ORAL at 18:12

## 2021-01-01 RX ADMIN — Medication 1 MILLIGRAM(S): at 05:56

## 2021-01-01 RX ADMIN — PIPERACILLIN AND TAZOBACTAM 25 GRAM(S): 4; .5 INJECTION, POWDER, LYOPHILIZED, FOR SOLUTION INTRAVENOUS at 18:12

## 2021-01-01 RX ADMIN — CHLORHEXIDINE GLUCONATE 1 APPLICATION(S): 213 SOLUTION TOPICAL at 05:14

## 2021-01-01 RX ADMIN — TAMSULOSIN HYDROCHLORIDE 0.4 MILLIGRAM(S): 0.4 CAPSULE ORAL at 01:00

## 2021-01-01 RX ADMIN — PANTOPRAZOLE SODIUM 40 MILLIGRAM(S): 20 TABLET, DELAYED RELEASE ORAL at 06:08

## 2021-01-01 RX ADMIN — ENOXAPARIN SODIUM 40 MILLIGRAM(S): 100 INJECTION SUBCUTANEOUS at 21:04

## 2021-01-01 RX ADMIN — CHLORHEXIDINE GLUCONATE 1 APPLICATION(S): 213 SOLUTION TOPICAL at 05:45

## 2021-01-01 RX ADMIN — FINASTERIDE 5 MILLIGRAM(S): 5 TABLET, FILM COATED ORAL at 11:48

## 2021-01-01 RX ADMIN — TAMSULOSIN HYDROCHLORIDE 0.4 MILLIGRAM(S): 0.4 CAPSULE ORAL at 22:11

## 2021-01-01 RX ADMIN — LEVETIRACETAM 400 MILLIGRAM(S): 250 TABLET, FILM COATED ORAL at 06:18

## 2021-01-01 RX ADMIN — Medication 1 MILLIGRAM(S): at 17:13

## 2021-01-01 RX ADMIN — FINASTERIDE 5 MILLIGRAM(S): 5 TABLET, FILM COATED ORAL at 11:15

## 2021-01-01 RX ADMIN — CARVEDILOL PHOSPHATE 3.12 MILLIGRAM(S): 80 CAPSULE, EXTENDED RELEASE ORAL at 17:15

## 2021-01-01 RX ADMIN — LEVETIRACETAM 750 MILLIGRAM(S): 250 TABLET, FILM COATED ORAL at 10:09

## 2021-01-01 RX ADMIN — FINASTERIDE 5 MILLIGRAM(S): 5 TABLET, FILM COATED ORAL at 13:32

## 2021-01-01 RX ADMIN — PANTOPRAZOLE SODIUM 40 MILLIGRAM(S): 20 TABLET, DELAYED RELEASE ORAL at 06:21

## 2021-01-01 RX ADMIN — CARVEDILOL PHOSPHATE 3.12 MILLIGRAM(S): 80 CAPSULE, EXTENDED RELEASE ORAL at 05:10

## 2021-01-01 RX ADMIN — LACOSAMIDE 100 MILLIGRAM(S): 50 TABLET ORAL at 06:35

## 2021-01-01 RX ADMIN — ENOXAPARIN SODIUM 50 MILLIGRAM(S): 100 INJECTION SUBCUTANEOUS at 18:08

## 2021-01-01 RX ADMIN — TAMSULOSIN HYDROCHLORIDE 0.4 MILLIGRAM(S): 0.4 CAPSULE ORAL at 21:35

## 2021-01-01 RX ADMIN — PANTOPRAZOLE SODIUM 40 MILLIGRAM(S): 20 TABLET, DELAYED RELEASE ORAL at 05:59

## 2021-01-01 RX ADMIN — Medication 4 MILLIGRAM(S): at 05:45

## 2021-01-01 RX ADMIN — LACOSAMIDE 100 MILLIGRAM(S): 50 TABLET ORAL at 16:42

## 2021-01-01 RX ADMIN — BUDESONIDE AND FORMOTEROL FUMARATE DIHYDRATE 2 PUFF(S): 160; 4.5 AEROSOL RESPIRATORY (INHALATION) at 08:26

## 2021-01-01 RX ADMIN — Medication 250 MILLIGRAM(S): at 17:18

## 2021-01-01 RX ADMIN — Medication 2 MILLIGRAM(S): at 17:47

## 2021-01-01 RX ADMIN — Medication 4 MILLIGRAM(S): at 06:17

## 2021-01-01 RX ADMIN — Medication 2 MILLIGRAM(S): at 21:45

## 2021-01-01 RX ADMIN — Medication 1 MILLIGRAM(S): at 17:16

## 2021-01-01 RX ADMIN — LEVETIRACETAM 750 MILLIGRAM(S): 250 TABLET, FILM COATED ORAL at 17:49

## 2021-01-01 RX ADMIN — DIVALPROEX SODIUM 250 MILLIGRAM(S): 500 TABLET, DELAYED RELEASE ORAL at 18:26

## 2021-01-01 RX ADMIN — ENOXAPARIN SODIUM 40 MILLIGRAM(S): 100 INJECTION SUBCUTANEOUS at 22:06

## 2021-01-01 RX ADMIN — Medication 40 MILLIEQUIVALENT(S): at 18:11

## 2021-01-01 RX ADMIN — BUDESONIDE AND FORMOTEROL FUMARATE DIHYDRATE 2 PUFF(S): 160; 4.5 AEROSOL RESPIRATORY (INHALATION) at 08:23

## 2021-01-01 RX ADMIN — Medication 2 MILLIGRAM(S): at 05:01

## 2021-01-01 RX ADMIN — CARVEDILOL PHOSPHATE 3.12 MILLIGRAM(S): 80 CAPSULE, EXTENDED RELEASE ORAL at 05:50

## 2021-01-01 RX ADMIN — Medication 3 MILLILITER(S): at 14:27

## 2021-01-01 RX ADMIN — LEVETIRACETAM 400 MILLIGRAM(S): 250 TABLET, FILM COATED ORAL at 20:55

## 2021-01-01 RX ADMIN — DIVALPROEX SODIUM 500 MILLIGRAM(S): 500 TABLET, DELAYED RELEASE ORAL at 01:15

## 2021-01-01 RX ADMIN — PIPERACILLIN AND TAZOBACTAM 25 GRAM(S): 4; .5 INJECTION, POWDER, LYOPHILIZED, FOR SOLUTION INTRAVENOUS at 13:01

## 2021-01-01 RX ADMIN — Medication 4 MILLIGRAM(S): at 05:10

## 2021-01-01 RX ADMIN — CARVEDILOL PHOSPHATE 3.12 MILLIGRAM(S): 80 CAPSULE, EXTENDED RELEASE ORAL at 17:22

## 2021-01-01 RX ADMIN — LACOSAMIDE 100 MILLIGRAM(S): 50 TABLET ORAL at 17:57

## 2021-01-01 RX ADMIN — LACOSAMIDE 100 MILLIGRAM(S): 50 TABLET ORAL at 05:58

## 2021-01-01 RX ADMIN — LACOSAMIDE 100 MILLIGRAM(S): 50 TABLET ORAL at 17:45

## 2021-01-01 RX ADMIN — LACOSAMIDE 100 MILLIGRAM(S): 50 TABLET ORAL at 17:25

## 2021-01-01 RX ADMIN — Medication 4 MILLIGRAM(S): at 15:06

## 2021-01-01 RX ADMIN — PIPERACILLIN AND TAZOBACTAM 25 GRAM(S): 4; .5 INJECTION, POWDER, LYOPHILIZED, FOR SOLUTION INTRAVENOUS at 23:35

## 2021-01-01 RX ADMIN — Medication 1 TABLET(S): at 17:13

## 2021-01-01 RX ADMIN — LEVETIRACETAM 750 MILLIGRAM(S): 250 TABLET, FILM COATED ORAL at 17:12

## 2021-01-01 RX ADMIN — Medication 1 TABLET(S): at 05:46

## 2021-01-01 RX ADMIN — LEVETIRACETAM 1500 MILLIGRAM(S): 250 TABLET, FILM COATED ORAL at 06:18

## 2021-01-01 RX ADMIN — Medication 3 MILLILITER(S): at 14:23

## 2021-01-01 RX ADMIN — TAMSULOSIN HYDROCHLORIDE 0.4 MILLIGRAM(S): 0.4 CAPSULE ORAL at 21:27

## 2021-01-01 RX ADMIN — CARVEDILOL PHOSPHATE 3.12 MILLIGRAM(S): 80 CAPSULE, EXTENDED RELEASE ORAL at 06:21

## 2021-01-01 RX ADMIN — CARVEDILOL PHOSPHATE 3.12 MILLIGRAM(S): 80 CAPSULE, EXTENDED RELEASE ORAL at 17:48

## 2021-01-01 RX ADMIN — Medication 60 MILLIGRAM(S): at 00:06

## 2021-01-01 RX ADMIN — FINASTERIDE 5 MILLIGRAM(S): 5 TABLET, FILM COATED ORAL at 12:26

## 2021-01-01 RX ADMIN — LACOSAMIDE 100 MILLIGRAM(S): 50 TABLET ORAL at 07:03

## 2021-01-01 RX ADMIN — Medication 1 MILLIGRAM(S): at 17:32

## 2021-01-01 RX ADMIN — LEVETIRACETAM 1500 MILLIGRAM(S): 250 TABLET, FILM COATED ORAL at 18:01

## 2021-01-01 RX ADMIN — PANTOPRAZOLE SODIUM 40 MILLIGRAM(S): 20 TABLET, DELAYED RELEASE ORAL at 13:33

## 2021-01-01 RX ADMIN — LACOSAMIDE 100 MILLIGRAM(S): 50 TABLET ORAL at 18:54

## 2021-01-01 RX ADMIN — CARVEDILOL PHOSPHATE 3.12 MILLIGRAM(S): 80 CAPSULE, EXTENDED RELEASE ORAL at 18:01

## 2021-01-01 RX ADMIN — TAMSULOSIN HYDROCHLORIDE 0.4 MILLIGRAM(S): 0.4 CAPSULE ORAL at 21:04

## 2021-01-01 RX ADMIN — LEVETIRACETAM 750 MILLIGRAM(S): 250 TABLET, FILM COATED ORAL at 05:26

## 2021-01-01 RX ADMIN — PIPERACILLIN AND TAZOBACTAM 25 GRAM(S): 4; .5 INJECTION, POWDER, LYOPHILIZED, FOR SOLUTION INTRAVENOUS at 05:10

## 2021-01-01 RX ADMIN — Medication 4 MILLIGRAM(S): at 16:55

## 2021-01-01 RX ADMIN — PANTOPRAZOLE SODIUM 40 MILLIGRAM(S): 20 TABLET, DELAYED RELEASE ORAL at 06:17

## 2021-01-01 RX ADMIN — LEVETIRACETAM 1000 MILLIGRAM(S): 250 TABLET, FILM COATED ORAL at 05:19

## 2021-01-01 RX ADMIN — Medication 3 MILLILITER(S): at 20:37

## 2021-01-01 RX ADMIN — Medication 4 MILLIGRAM(S): at 21:14

## 2021-01-01 RX ADMIN — CHLORHEXIDINE GLUCONATE 1 APPLICATION(S): 213 SOLUTION TOPICAL at 05:27

## 2021-01-01 RX ADMIN — DIVALPROEX SODIUM 750 MILLIGRAM(S): 500 TABLET, DELAYED RELEASE ORAL at 15:06

## 2021-01-01 RX ADMIN — ENOXAPARIN SODIUM 53 MILLIGRAM(S): 100 INJECTION SUBCUTANEOUS at 22:11

## 2021-01-01 RX ADMIN — Medication 6 MILLIGRAM(S): at 23:51

## 2021-01-01 RX ADMIN — PIPERACILLIN AND TAZOBACTAM 25 GRAM(S): 4; .5 INJECTION, POWDER, LYOPHILIZED, FOR SOLUTION INTRAVENOUS at 23:53

## 2021-01-01 RX ADMIN — CEFEPIME 100 MILLIGRAM(S): 1 INJECTION, POWDER, FOR SOLUTION INTRAMUSCULAR; INTRAVENOUS at 18:37

## 2021-01-01 RX ADMIN — LEVETIRACETAM 1500 MILLIGRAM(S): 250 TABLET, FILM COATED ORAL at 05:10

## 2021-01-01 RX ADMIN — Medication 1 MILLIGRAM(S): at 18:08

## 2021-01-01 RX ADMIN — Medication 4 MILLIGRAM(S): at 12:29

## 2021-01-01 RX ADMIN — Medication 3 MILLILITER(S): at 08:43

## 2021-01-01 RX ADMIN — LACOSAMIDE 100 MILLIGRAM(S): 50 TABLET ORAL at 17:17

## 2021-01-01 RX ADMIN — ENOXAPARIN SODIUM 40 MILLIGRAM(S): 100 INJECTION SUBCUTANEOUS at 21:35

## 2021-01-01 RX ADMIN — FINASTERIDE 5 MILLIGRAM(S): 5 TABLET, FILM COATED ORAL at 12:11

## 2021-01-01 RX ADMIN — LEVETIRACETAM 1500 MILLIGRAM(S): 250 TABLET, FILM COATED ORAL at 06:17

## 2021-01-01 RX ADMIN — Medication 3 MILLILITER(S): at 14:55

## 2021-01-01 RX ADMIN — PIPERACILLIN AND TAZOBACTAM 25 GRAM(S): 4; .5 INJECTION, POWDER, LYOPHILIZED, FOR SOLUTION INTRAVENOUS at 11:15

## 2021-01-01 RX ADMIN — LEVETIRACETAM 400 MILLIGRAM(S): 250 TABLET, FILM COATED ORAL at 05:09

## 2021-01-01 RX ADMIN — CARVEDILOL PHOSPHATE 3.12 MILLIGRAM(S): 80 CAPSULE, EXTENDED RELEASE ORAL at 18:08

## 2021-01-01 RX ADMIN — DIVALPROEX SODIUM 500 MILLIGRAM(S): 500 TABLET, DELAYED RELEASE ORAL at 12:48

## 2021-01-01 RX ADMIN — ENOXAPARIN SODIUM 40 MILLIGRAM(S): 100 INJECTION SUBCUTANEOUS at 22:17

## 2021-01-01 RX ADMIN — CARVEDILOL PHOSPHATE 3.12 MILLIGRAM(S): 80 CAPSULE, EXTENDED RELEASE ORAL at 18:11

## 2021-01-01 RX ADMIN — CARVEDILOL PHOSPHATE 3.12 MILLIGRAM(S): 80 CAPSULE, EXTENDED RELEASE ORAL at 04:51

## 2021-01-01 RX ADMIN — Medication 1 MILLIGRAM(S): at 11:39

## 2021-01-01 RX ADMIN — CARVEDILOL PHOSPHATE 3.12 MILLIGRAM(S): 80 CAPSULE, EXTENDED RELEASE ORAL at 17:47

## 2021-01-01 RX ADMIN — FINASTERIDE 5 MILLIGRAM(S): 5 TABLET, FILM COATED ORAL at 12:04

## 2021-01-01 RX ADMIN — CARVEDILOL PHOSPHATE 3.12 MILLIGRAM(S): 80 CAPSULE, EXTENDED RELEASE ORAL at 05:17

## 2021-01-01 RX ADMIN — LEVETIRACETAM 400 MILLIGRAM(S): 250 TABLET, FILM COATED ORAL at 04:28

## 2021-01-01 RX ADMIN — Medication 650 MILLIGRAM(S): at 18:58

## 2021-01-01 RX ADMIN — Medication 1 MILLIGRAM(S): at 05:47

## 2021-01-01 RX ADMIN — FINASTERIDE 5 MILLIGRAM(S): 5 TABLET, FILM COATED ORAL at 11:17

## 2021-01-01 RX ADMIN — Medication 4 MILLIGRAM(S): at 12:47

## 2021-01-01 RX ADMIN — Medication 1 MILLIGRAM(S): at 06:21

## 2021-01-01 RX ADMIN — LACOSAMIDE 50 MILLIGRAM(S): 50 TABLET ORAL at 05:17

## 2021-01-01 RX ADMIN — LEVETIRACETAM 750 MILLIGRAM(S): 250 TABLET, FILM COATED ORAL at 05:45

## 2021-01-01 RX ADMIN — TAMSULOSIN HYDROCHLORIDE 0.4 MILLIGRAM(S): 0.4 CAPSULE ORAL at 20:51

## 2021-01-01 RX ADMIN — ENOXAPARIN SODIUM 40 MILLIGRAM(S): 100 INJECTION SUBCUTANEOUS at 20:59

## 2021-01-01 RX ADMIN — LEVETIRACETAM 400 MILLIGRAM(S): 250 TABLET, FILM COATED ORAL at 06:24

## 2021-01-01 RX ADMIN — Medication 3 MILLILITER(S): at 14:49

## 2021-01-01 RX ADMIN — CHLORHEXIDINE GLUCONATE 1 APPLICATION(S): 213 SOLUTION TOPICAL at 06:16

## 2021-01-01 RX ADMIN — Medication 1 MILLIGRAM(S): at 00:06

## 2021-01-01 RX ADMIN — TAMSULOSIN HYDROCHLORIDE 0.4 MILLIGRAM(S): 0.4 CAPSULE ORAL at 20:55

## 2021-01-01 RX ADMIN — CARVEDILOL PHOSPHATE 3.12 MILLIGRAM(S): 80 CAPSULE, EXTENDED RELEASE ORAL at 17:13

## 2021-01-01 RX ADMIN — FINASTERIDE 5 MILLIGRAM(S): 5 TABLET, FILM COATED ORAL at 14:18

## 2021-01-01 RX ADMIN — FINASTERIDE 5 MILLIGRAM(S): 5 TABLET, FILM COATED ORAL at 12:45

## 2021-01-01 RX ADMIN — LEVETIRACETAM 1500 MILLIGRAM(S): 250 TABLET, FILM COATED ORAL at 17:03

## 2021-01-01 RX ADMIN — PIPERACILLIN AND TAZOBACTAM 25 GRAM(S): 4; .5 INJECTION, POWDER, LYOPHILIZED, FOR SOLUTION INTRAVENOUS at 06:20

## 2021-01-01 RX ADMIN — CARVEDILOL PHOSPHATE 3.12 MILLIGRAM(S): 80 CAPSULE, EXTENDED RELEASE ORAL at 05:56

## 2021-01-01 RX ADMIN — TIOTROPIUM BROMIDE 1 CAPSULE(S): 18 CAPSULE ORAL; RESPIRATORY (INHALATION) at 10:51

## 2021-01-01 RX ADMIN — LEVETIRACETAM 400 MILLIGRAM(S): 250 TABLET, FILM COATED ORAL at 17:48

## 2021-01-01 RX ADMIN — BUDESONIDE AND FORMOTEROL FUMARATE DIHYDRATE 2 PUFF(S): 160; 4.5 AEROSOL RESPIRATORY (INHALATION) at 20:51

## 2021-01-01 RX ADMIN — PIPERACILLIN AND TAZOBACTAM 200 GRAM(S): 4; .5 INJECTION, POWDER, LYOPHILIZED, FOR SOLUTION INTRAVENOUS at 11:17

## 2021-01-01 RX ADMIN — TAMSULOSIN HYDROCHLORIDE 0.4 MILLIGRAM(S): 0.4 CAPSULE ORAL at 22:00

## 2021-01-01 RX ADMIN — LEVETIRACETAM 400 MILLIGRAM(S): 250 TABLET, FILM COATED ORAL at 01:42

## 2021-01-01 RX ADMIN — PANTOPRAZOLE SODIUM 40 MILLIGRAM(S): 20 TABLET, DELAYED RELEASE ORAL at 05:32

## 2021-01-01 RX ADMIN — FINASTERIDE 5 MILLIGRAM(S): 5 TABLET, FILM COATED ORAL at 13:01

## 2021-01-01 RX ADMIN — PANTOPRAZOLE SODIUM 40 MILLIGRAM(S): 20 TABLET, DELAYED RELEASE ORAL at 05:45

## 2021-01-01 RX ADMIN — BUDESONIDE AND FORMOTEROL FUMARATE DIHYDRATE 2 PUFF(S): 160; 4.5 AEROSOL RESPIRATORY (INHALATION) at 08:45

## 2021-01-01 RX ADMIN — SODIUM CHLORIDE 1000 MILLILITER(S): 9 INJECTION, SOLUTION INTRAVENOUS at 20:55

## 2021-01-01 RX ADMIN — LACOSAMIDE 100 MILLIGRAM(S): 50 TABLET ORAL at 00:06

## 2021-01-01 RX ADMIN — LEVETIRACETAM 1000 MILLIGRAM(S): 250 TABLET, FILM COATED ORAL at 18:27

## 2021-01-01 RX ADMIN — FINASTERIDE 5 MILLIGRAM(S): 5 TABLET, FILM COATED ORAL at 12:29

## 2021-01-01 RX ADMIN — Medication 4 MILLIGRAM(S): at 20:35

## 2021-01-01 RX ADMIN — SODIUM CHLORIDE 1000 MILLILITER(S): 9 INJECTION, SOLUTION INTRAVENOUS at 18:12

## 2021-01-01 RX ADMIN — LACOSAMIDE 50 MILLIGRAM(S): 50 TABLET ORAL at 18:29

## 2021-01-01 RX ADMIN — LEVETIRACETAM 750 MILLIGRAM(S): 250 TABLET, FILM COATED ORAL at 05:00

## 2021-01-01 RX ADMIN — PANTOPRAZOLE SODIUM 40 MILLIGRAM(S): 20 TABLET, DELAYED RELEASE ORAL at 05:10

## 2021-01-01 RX ADMIN — PANTOPRAZOLE SODIUM 40 MILLIGRAM(S): 20 TABLET, DELAYED RELEASE ORAL at 05:50

## 2021-01-01 RX ADMIN — Medication 1 MILLIGRAM(S): at 18:11

## 2021-01-01 RX ADMIN — Medication 27.75 MILLIGRAM(S): at 18:23

## 2021-01-01 RX ADMIN — Medication 1000 MILLIGRAM(S): at 18:37

## 2021-01-01 RX ADMIN — PANTOPRAZOLE SODIUM 40 MILLIGRAM(S): 20 TABLET, DELAYED RELEASE ORAL at 06:35

## 2021-01-01 RX ADMIN — Medication 1 MILLIGRAM(S): at 18:13

## 2021-01-01 RX ADMIN — Medication 1 MILLIGRAM(S): at 05:00

## 2021-01-01 RX ADMIN — CARVEDILOL PHOSPHATE 3.12 MILLIGRAM(S): 80 CAPSULE, EXTENDED RELEASE ORAL at 06:17

## 2021-01-01 RX ADMIN — Medication 100 GRAM(S): at 15:06

## 2021-01-01 RX ADMIN — CARVEDILOL PHOSPHATE 3.12 MILLIGRAM(S): 80 CAPSULE, EXTENDED RELEASE ORAL at 05:38

## 2021-01-01 RX ADMIN — Medication 4 MILLIGRAM(S): at 13:25

## 2021-01-01 RX ADMIN — Medication 4 MILLIGRAM(S): at 22:06

## 2021-01-01 RX ADMIN — CHLORHEXIDINE GLUCONATE 1 APPLICATION(S): 213 SOLUTION TOPICAL at 06:17

## 2021-01-01 RX ADMIN — CEFEPIME 100 MILLIGRAM(S): 1 INJECTION, POWDER, FOR SOLUTION INTRAMUSCULAR; INTRAVENOUS at 06:25

## 2021-01-01 RX ADMIN — APIXABAN 10 MILLIGRAM(S): 2.5 TABLET, FILM COATED ORAL at 05:47

## 2021-01-01 RX ADMIN — CARVEDILOL PHOSPHATE 3.12 MILLIGRAM(S): 80 CAPSULE, EXTENDED RELEASE ORAL at 06:18

## 2021-01-01 RX ADMIN — TAMSULOSIN HYDROCHLORIDE 0.4 MILLIGRAM(S): 0.4 CAPSULE ORAL at 21:14

## 2021-01-01 RX ADMIN — CARVEDILOL PHOSPHATE 3.12 MILLIGRAM(S): 80 CAPSULE, EXTENDED RELEASE ORAL at 00:06

## 2021-01-01 RX ADMIN — CARVEDILOL PHOSPHATE 3.12 MILLIGRAM(S): 80 CAPSULE, EXTENDED RELEASE ORAL at 05:47

## 2021-01-01 RX ADMIN — PANTOPRAZOLE SODIUM 40 MILLIGRAM(S): 20 TABLET, DELAYED RELEASE ORAL at 06:18

## 2021-01-01 RX ADMIN — PANTOPRAZOLE SODIUM 40 MILLIGRAM(S): 20 TABLET, DELAYED RELEASE ORAL at 11:47

## 2021-01-01 RX ADMIN — BUDESONIDE AND FORMOTEROL FUMARATE DIHYDRATE 2 PUFF(S): 160; 4.5 AEROSOL RESPIRATORY (INHALATION) at 09:03

## 2021-01-01 RX ADMIN — PIPERACILLIN AND TAZOBACTAM 25 GRAM(S): 4; .5 INJECTION, POWDER, LYOPHILIZED, FOR SOLUTION INTRAVENOUS at 17:49

## 2021-01-01 RX ADMIN — PIPERACILLIN AND TAZOBACTAM 25 GRAM(S): 4; .5 INJECTION, POWDER, LYOPHILIZED, FOR SOLUTION INTRAVENOUS at 05:59

## 2021-01-01 RX ADMIN — PIPERACILLIN AND TAZOBACTAM 25 GRAM(S): 4; .5 INJECTION, POWDER, LYOPHILIZED, FOR SOLUTION INTRAVENOUS at 01:01

## 2021-01-13 NOTE — ED PROVIDER NOTE - CLINICAL SUMMARY MEDICAL DECISION MAKING FREE TEXT BOX
Catia  [id number].  80yoM with h/o lung ca with mets to brain on Keppra as only AED, presents with seizure x3-4 since 6pm. Associated sleepiness/confusion after sz though returned to baseline prior to arrival in ED. Deny fall/head trauma and all other symptoms including fever, vomiting, diarrhea, cough, rashes. On exam (examined after ativan), afebrile, hemodynamically stable, saturating well on RA, laying in bed, eyes closed, head NCAT, EOMI roving, anicteric, MMM, RRR, nml S1/S2, no m/r/g, lungs CTAB, no w/r/r, abd soft, NT, ND, nml BS, no rebound or guarding, lethargic, PARRY spontaneously, no leg cyanosis or edema, skin warm, well perfused, no rashes or hives. Concern for worsening sz likely 2/2 metastatic dz. No fever or infectious s/s's. No arrhythmia. Noted +trop and ECG changes however ECG essentially unchanged from prior and no CP/SOB with low suspicion for actual ACS. No new anemia or significant electrolyte abnormality. No fever or specific infectious symptoms. Loaded on Keppra, given ativan following self-limited sz here in ED. Neuro following. Low suspicion for status at this time. Patient nontoxic appearing, hemodynamically stable, protecting airway. Admitted to internal medicine for further monitoring, w/u, and care.

## 2021-01-13 NOTE — ED ADULT TRIAGE NOTE - CHIEF COMPLAINT QUOTE
pt BIBA from home as per family pt had "long seizure" at home, has known hx seizures. Pt was acting normally after seizure, nonverbal at baseline, and then vomited multiple times in ambulance. Pt alert and awake in triage, no seizure activity.  in triage.

## 2021-01-13 NOTE — ED PROVIDER NOTE - NS ED ROS FT
Eyes:  No visual changes, eye pain or discharge.  ENMT:  No hearing changes, pain, no sore throat or runny nose, no difficulty swallowing  Cardiac:  No chest pain, SOB or edema. No chest pain with exertion.  Respiratory:  No cough or respiratory distress. No hemoptysis. No history of asthma or RAD.  GI:  No diarrhea or abdominal pain. + nausea/vomiting  :  No dysuria, frequency or burning.  MS:  No myalgia, muscle weakness, joint pain or back pain.  Neuro:  No headache or weakness.  No LOC. + seizure  Skin:  No skin rash.   Endocrine: No history of thyroid disease or diabetes.

## 2021-01-13 NOTE — ED PROVIDER NOTE - ATTENDING CONTRIBUTION TO CARE
Catia  [id number].  80yoM with h/o lung ca with mets to brain on Keppra as only AED, presents with seizure x3-4 since 6pm. Associated sleepiness/confusion after sz though returned to baseline prior to arrival in ED. Deny fall/head trauma and all other symptoms including fever, vomiting, diarrhea, cough, rashes. On exam (examined after ativan), afebrile, hemodynamically stable, saturating well on RA, laying in bed, eyes closed, head NCAT, EOMI roving, anicteric, MMM, RRR, nml S1/S2, no m/r/g, lungs CTAB, no w/r/r, abd soft, NT, ND, nml BS, no rebound or guarding, lethargic, PARRY spontaneously, no leg cyanosis or edema, skin warm, well perfused, no rashes or hives. Concern for worsening sz likely 2/2 metastatic dz. No fever or infectious s/s's. No arrhythmia. Noted +trop and ECG changes however ECG essentially unchanged from prior and no CP/SOB with low suspicion for actual ACS. No new anemia or significant electrolyte abnormality. No fever or specific infectious symptoms. Loaded on Keppra, given ativan following self-limited sz here in ED. Neuro following. Low suspicion for status at this time. Patient nontoxic appearing, hemodynamically stable, protecting airway. Admitted to internal medicine for further monitoring, w/u, and care.

## 2021-01-13 NOTE — ED PROVIDER NOTE - PHYSICAL EXAMINATION
CONSTITUTIONAL: Frail appearing.  SKIN: warm, dry  HEAD: Normocephalic; atraumatic.  EYES: PERRL, EOMI, no conjunctival erythema  ENT: No nasal discharge; airway clear.  NECK: Supple; non tender.  CARD: S1, S2 normal; no murmurs, gallops, or rubs. Regular rate and rhythm.   RESP: No wheezes, rales or rhonchi.  ABD: soft ntnd  EXT: Normal ROM.  No clubbing, cyanosis or edema.   LYMPH: No acute cervical adenopathy.  NEURO: AO x 1. Intermittently following commands.

## 2021-01-13 NOTE — ED PROVIDER NOTE - PROGRESS NOTE DETAILS
Called in by RN due to pt seizing. Upon arrival, pt post-ictal. Seizure resolved. Will give benzo and cont to monitor. Discussed with neurology. Will come evaluate the pt.

## 2021-01-13 NOTE — ED PROVIDER NOTE - OBJECTIVE STATEMENT
80y M w/ PMH of lung ca w/ mets to the brain on chemo (last chemo 1/5), HTN, and BPH presents with seizures. Per wife, pt had diffuse tonic-clonic ep that lasted couple min and resolved on own. Had n/v afterwards. Had repeat ep in ED as well that resolved on own. States was admitted for similar symptoms multiple times. Was placed on keppra and steroids. Denies fever, chills, CP, SOB, diarrhea, dysuria.     Heme/onc: in ema.

## 2021-01-14 NOTE — CHART NOTE - NSCHARTNOTEFT_GEN_A_CORE
Patient was seen by nocturnist today   Admitted after tonic clonic seizure yesterday   history of lung cancer mets to brain s/p resection and seizure episode - treated with keppra 1500 bid during last admission.     Patient been noted more awake then last exam still confused.     Plan:   - unclear dose of keppra - 1500 BID at home or 750 bid - will clarify with pharmacy   - if 750 bid then increase dose to 1000 bid   - if 1500 bid then will continue home dose and add depakote loading dose followed by 500 bid   - neurology on board

## 2021-01-14 NOTE — H&P ADULT - ASSESSMENT
81 yo male, Cantonese speaking with PMH of Lung ca (dx 05/2018) with mets to brain s/p resection (09/2019)  on chemotherapy- last chemo on Jan 5, HTN, BPH comes to the ED after seizure episode.     #) Seizures with h/o Brain mets s/p Resection  - HD stable, 3 episodes today that spontaneously resolved  - CT head with stable post surgical changes, might need MRI if concern for new mets  - s/p Ativan and keppra in the ED  - Check keppra level in AM  - REEG, neuro eval  - c/w Keppra and Dexamethasone for now  - Follows with Dr Wasserman in [facility] for [facility]    #) Troponemia- Doubt ACS  - no active chest pain  - EKG non ischemic  - Follow up trop in AM    #) H/o Lung Ca  - Left side Mass on CXR  - On chemotherapy  - Wean off oxygen as tolerated.     #) h/o BPH  - c/w home meds- flomax, finasteride.     #) h/o HTN  - c/w coreg    #) diet- regular dash, speech swallow eval  #) DVT ppx- Lovenox  #) GI ppx- protonix  #) dispo- acute

## 2021-01-14 NOTE — DIETITIAN INITIAL EVALUATION ADULT. - OTHER CALCULATIONS
ABW: 53.1kg ----- CALORIE: 0898-0287 kcal/day (MSJ x 1.2-1.4);    PROTEIN: 58-74 g/day (1.1-1.4 g/kg of ABW);      FLUID: 1ml/kcal or per LIP

## 2021-01-14 NOTE — CONSULT NOTE ADULT - SUBJECTIVE AND OBJECTIVE BOX
1.Presentation:  Seizure    2. Today's Acute Problems:   Breakthrough seizure      3.History: Cantonese  used   81yo M with h/o lung ca with mets to brain s/p resection (09/2019) with subsequent seizure is on Keppra,  he walks with a walker and functional with ADLs at baseline, presents with a witnessed seizure while at home today. As per wife at bedside patient was having dinner when he suddenly started shaking with eye rolling upwards lasting few minutes followed by confusion and lethargy improved. While in ED patient was noted to have another witnessed seizure which was about 20 sec in duration and was given ativan 2mg IV and was loaded with Keppra 1gm. Wife states that patient is compliant with his seizure medications and his last seizure is probably few weeks ago. Patient is currently very lethargic only responding to noxious stimuli.  Patient is nontoxic appearing, hemodynamically stable, protecting airway.         5.Medications:  chlorhexidine 4% Liquid 1 Application(s) Topical <User Schedule>        7.Neuro Exam:  Patient is very lethargic only responding to pain stimulus , not verbal  CN: Pupils 3mm brisk response, eyes midline, face symmetric, not verbal  Power: Moving UE>LE randomly and with pain stimulus  Sensory: responding to pain by withdrawing UE>LE      Last CTH:  < from: CT Head No Cont (01.13.21 @ 21:38) >  FINDINGS:    Persistent postsurgical change reflecting left frontal craniotomy with unchanged subjacent left frontal encephalomalacia. There is stable left frontal subdural fluid collection without significant change in volume, likely postoperative in nature. Stable partially calcified left frontal dura.    Stable focal hyperdensity in the inferior left frontal lobe, conforming to the shape of gyrus, without signal change in attenuation, configuration, or sizecompared to the prior study.    Stable right frontal hypodensity without significant mass effect which could reflect posttreatment change versus edema.    There is no evidence of acute territorial/transcortical infarction. There is no midline shift.    There is no evidence of hydrocephalus.    The visualized intraorbital contents are normal. The imaged portions of the paranasal sinuses and mastoid air cells are aerated. Partially visualized parotid glands are normal.      IMPRESSION:    No evidence of new acute intracranial hemorrhage, mass effect, or midline shift.    Multiple stable findings as above.    Note limitations of noncontrast head CT for evaluation of metastatic disease.              TYLER ELDER MD; Attending Radiologist  This document has been electronically signed. Jan 13 2021 10:20PM    < end of copied text >        Last MRI:  < from: MR Head w/wo IV Cont (11.01.20 @ 09:42) >  FINDINGS:  Left frontal craniotomy with subjacent left frontal encephalomalacia.    There is decreased edema within the LEFT frontal lobe. There is enhancement lining the surgical resection cavity with no definite masslike enhancement, likely representing postoperative change. An LEFT frontal extra-axial fluid collection is present, without change.    Previously demonstrated RIGHT frontal enhancing mass is no longer present. Small amount of left frontal encephalomalacia.    There is no evidence of acute infarct, acute intracranial hemorrhage, mass effect or hydrocephalus. Basal cisterns are patent.    Ventricles and sulci are age-appropriate in size.    Major intracranial flow-voids are preserved.    The orbits, sellar and suprasellar structures, and craniocervical junction are unremarkable. Visualized paranasal sinuses and mastoid air cells are clear.    IMPRESSION:  Decreased edema within the LEFT frontal lobe. There is enhancement lining the surgical resection cavity with no definite masslike enhancement, likely representing postoperative change. No definite evidence of recurrence.    An LEFT frontal extra-axial postsurgical fluid collection is present, without change.    Previously demonstrated RIGHT frontal enhancing mass is no longer present.        NORBERTO CLINE MD; Attending Radiologist  This document has been electronically signed. Nov 1 2020 12:23PM    < end of copied text >          Last EEG:  < from: EEG w/ Video Each 12-26 Hours, Unmonitored (10.31.20 @ 12:40) >  Awake:  The recording during the wakefulness consists of a symmetrical and well-organized background and posterior dominant rhythm in the range of 7-8 Hz, which is reactive to eye opening and eye closure and change in the level of alertness.    Asleep:  Different stages of sleep were presented well.  Stage II of non-REM sleep was characterized by the presence of symmetrical and well-defined sleep spindles and vertex sharp waves.  The deeperstages of sleep were identified by the presence of low theta and delta range activity seen diffusely over both hemispheres and more prominently from the frontal and central derivations.  All stages captured and symmetric.    Focal Slowing:  Moderate -severe left hemispheric slowing  Mild right hemispheric slowing      Generalized Slowing:  mild    Interictal Activity  No epileptiform activity    Activation and Provocation Procedures:  Not performed      Events:  No events captured        Day #2: 10/30/2020 (09:00:00) to 10/31/2020 (09:00:00)        Awake:  The recording during the wakefulness consists of a symmetrical and well-organized background and posterior dominant rhythm in the range of 7-8 Hz, which is reactive to eye opening and eye closure and change in the level of alertness.    Asleep:  Different stages of sleep were presented well.  Stage II of non-REM sleep was characterized by the presence of symmetrical and well-defined sleep spindles and vertex sharp waves.  The deeper stages of sleep were identified by the presence of low theta and delta range activity seen diffusely over both hemispheres and more prominently from the frontal and central derivations.  All stages captured and symmetric.    Focal Slowing:  Moderate -severe left hemispheric slowing  Mild right hemispheric slowing      Generalized Slowing:  mild    Interictal Activity  No epileptiform activity    Activation and Provocation Procedures:  Not performed      Events:  No events captured        Impression  This is an abnormal Video EEG study with mild generalized background slowing and moderately severe left hemispheric and mild right hemisphere focal slowing, consistent with a structural lesion in that area. There is no epileptiform activity or clinical/subclinical seizure activity noted in the study.    FAVIO CAMPOS MD; Attending Neurologist  This document has been electronically signed. Oct 31 2020 11:13AM    < end of copied text >          8.CVS:  Vital Signs Last 24 Hrs  T(C): 36.8 (13 Jan 2021 23:48), Max: 36.8 (13 Jan 2021 23:48)  T(F): 98.3 (13 Jan 2021 23:48), Max: 98.3 (13 Jan 2021 23:48)  HR: 127 (13 Jan 2021 23:48) (62 - 127)  BP: 145/67 (13 Jan 2021 23:48) (107/74 - 145/67)  BP(mean): --  RR: 16 (13 Jan 2021 23:48) (16 - 18)  SpO2: 92% (13 Jan 2021 23:48) (92% - 96%)      Last EKG:  < from: 12 Lead ECG (01.13.21 @ 21:43) >  Ventricular Rate 110 BPM    Atrial Rate 110 BPM    P-R Interval 164 ms    QRS Duration 84 ms    Q-T Interval 352 ms    QTC Calculation(Bazett) 476 ms    P Axis 27 degrees    R Axis 108 degrees    T Axis -8 degrees    Diagnosis Line Sinus tachycardia with Premature supraventricular complexes  Possible Right ventricular hypertrophy  Nonspecific ST-T changes  Abnormal ECG    Confirmed by Luke Malcolm ([id number]) on 1/13/2021 11:35:13 PM    < end of copied text >          11.Renal/Fluids/Electrolytes:    01-13    139  |  104  |  27<H>  ----------------------------<  180<H>  3.8   |  23  |  0.8    Ca    8.1<L>      13 Jan 2021 20:50    TPro  4.8<L>  /  Alb  3.1<L>  /  TBili  0.2  /  DBili  x   /  AST  25  /  ALT  16  /  Alk Phos  79  01-13            12.ID:  TMax:  Vital Signs Last 24 Hrs  T(C): 36.8 (13 Jan 2021 23:48), Max: 36.8 (13 Jan 2021 23:48)  T(F): 98.3 (13 Jan 2021 23:48), Max: 98.3 (13 Jan 2021 23:48)  HR: 127 (13 Jan 2021 23:48) (62 - 127)  BP: 145/67 (13 Jan 2021 23:48) (107/74 - 145/67)  BP(mean): --  RR: 16 (13 Jan 2021 23:48) (16 - 18)  SpO2: 92% (13 Jan 2021 23:48) (92% - 96%)          13.Hematology:                        11.1   9.62  )-----------( 282      ( 13 Jan 2021 20:50 )             33.9     COVID-19 PCR . (01.13.21 @ 22:36)   COVID-19 PCR: Not Detec:    Respiratory Viral Panel with COVID-19 by NAZARIO (10.28.20 @ 07:10)   Rapid RVP Result: NotDetec            1.Presentation:  Seizure    2. Today's Acute Problems:   Breakthrough seizure      3.History: Cantonese  used   79yo M with h/o lung ca with mets to brain s/p resection (09/2019)  on chemotherapy- last chemo on Jan 5, seizure disorder , on keppra , he walks with a walker and functional with ADLs at baseline, presents with 2 witnessed seizures while at home today. As per wife at bedside patient was having dinner when he suddenly started shaking with eye rolling upwards lasting few minutes followed by confusion and lethargy improved. While in ED patient was noted to have another witnessed seizure which was about 20 sec in duration and was given ativan 2mg IV and was loaded with Keppra 1gm. Wife states that patient is compliant with his seizure medications and his last seizure is probably few weeks ago. Patient is currently very lethargic only responding to noxious stimuli.  Patient is nontoxic appearing, hemodynamically stable, protecting airway.         5.Medications:  chlorhexidine 4% Liquid 1 Application(s) Topical <User Schedule>        7.Neuro Exam:  Patient is very lethargic only responding to pain stimulus , not verbal  CN: Pupils 3mm brisk response, eyes midline, face symmetric, not verbal  Power: Moving UE>LE randomly and with pain stimulus  Sensory: responding to pain by withdrawing UE>LE      Last CTH:  < from: CT Head No Cont (01.13.21 @ 21:38) >  FINDINGS:    Persistent postsurgical change reflecting left frontal craniotomy with unchanged subjacent left frontal encephalomalacia. There is stable left frontal subdural fluid collection without significant change in volume, likely postoperative in nature. Stable partially calcified left frontal dura.    Stable focal hyperdensity in the inferior left frontal lobe, conforming to the shape of gyrus, without signal change in attenuation, configuration, or sizecompared to the prior study.    Stable right frontal hypodensity without significant mass effect which could reflect posttreatment change versus edema.    There is no evidence of acute territorial/transcortical infarction. There is no midline shift.    There is no evidence of hydrocephalus.    The visualized intraorbital contents are normal. The imaged portions of the paranasal sinuses and mastoid air cells are aerated. Partially visualized parotid glands are normal.      IMPRESSION:    No evidence of new acute intracranial hemorrhage, mass effect, or midline shift.    Multiple stable findings as above.    Note limitations of noncontrast head CT for evaluation of metastatic disease.              TYLER ELDER MD; Attending Radiologist  This document has been electronically signed. Jan 13 2021 10:20PM    < end of copied text >        Last MRI:  < from: MR Head w/wo IV Cont (11.01.20 @ 09:42) >  FINDINGS:  Left frontal craniotomy with subjacent left frontal encephalomalacia.    There is decreased edema within the LEFT frontal lobe. There is enhancement lining the surgical resection cavity with no definite masslike enhancement, likely representing postoperative change. An LEFT frontal extra-axial fluid collection is present, without change.    Previously demonstrated RIGHT frontal enhancing mass is no longer present. Small amount of left frontal encephalomalacia.    There is no evidence of acute infarct, acute intracranial hemorrhage, mass effect or hydrocephalus. Basal cisterns are patent.    Ventricles and sulci are age-appropriate in size.    Major intracranial flow-voids are preserved.    The orbits, sellar and suprasellar structures, and craniocervical junction are unremarkable. Visualized paranasal sinuses and mastoid air cells are clear.    IMPRESSION:  Decreased edema within the LEFT frontal lobe. There is enhancement lining the surgical resection cavity with no definite masslike enhancement, likely representing postoperative change. No definite evidence of recurrence.    An LEFT frontal extra-axial postsurgical fluid collection is present, without change.    Previously demonstrated RIGHT frontal enhancing mass is no longer present.        NORBERTO CLINE MD; Attending Radiologist  This document has been electronically signed. Nov 1 2020 12:23PM    < end of copied text >          Last EEG:  < from: EEG w/ Video Each 12-26 Hours, Unmonitored (10.31.20 @ 12:40) >  Awake:  The recording during the wakefulness consists of a symmetrical and well-organized background and posterior dominant rhythm in the range of 7-8 Hz, which is reactive to eye opening and eye closure and change in the level of alertness.    Asleep:  Different stages of sleep were presented well.  Stage II of non-REM sleep was characterized by the presence of symmetrical and well-defined sleep spindles and vertex sharp waves.  The deeperstages of sleep were identified by the presence of low theta and delta range activity seen diffusely over both hemispheres and more prominently from the frontal and central derivations.  All stages captured and symmetric.    Focal Slowing:  Moderate -severe left hemispheric slowing  Mild right hemispheric slowing      Generalized Slowing:  mild    Interictal Activity  No epileptiform activity    Activation and Provocation Procedures:  Not performed      Events:  No events captured        Day #2: 10/30/2020 (09:00:00) to 10/31/2020 (09:00:00)        Awake:  The recording during the wakefulness consists of a symmetrical and well-organized background and posterior dominant rhythm in the range of 7-8 Hz, which is reactive to eye opening and eye closure and change in the level of alertness.    Asleep:  Different stages of sleep were presented well.  Stage II of non-REM sleep was characterized by the presence of symmetrical and well-defined sleep spindles and vertex sharp waves.  The deeper stages of sleep were identified by the presence of low theta and delta range activity seen diffusely over both hemispheres and more prominently from the frontal and central derivations.  All stages captured and symmetric.    Focal Slowing:  Moderate -severe left hemispheric slowing  Mild right hemispheric slowing      Generalized Slowing:  mild    Interictal Activity  No epileptiform activity    Activation and Provocation Procedures:  Not performed      Events:  No events captured        Impression  This is an abnormal Video EEG study with mild generalized background slowing and moderately severe left hemispheric and mild right hemisphere focal slowing, consistent with a structural lesion in that area. There is no epileptiform activity or clinical/subclinical seizure activity noted in the study.    FAVIO CAMPOS MD; Attending Neurologist  This document has been electronically signed. Oct 31 2020 11:13AM    < end of copied text >          8.CVS:  Vital Signs Last 24 Hrs  T(C): 36.8 (13 Jan 2021 23:48), Max: 36.8 (13 Jan 2021 23:48)  T(F): 98.3 (13 Jan 2021 23:48), Max: 98.3 (13 Jan 2021 23:48)  HR: 127 (13 Jan 2021 23:48) (62 - 127)  BP: 145/67 (13 Jan 2021 23:48) (107/74 - 145/67)  BP(mean): --  RR: 16 (13 Jan 2021 23:48) (16 - 18)  SpO2: 92% (13 Jan 2021 23:48) (92% - 96%)      Last EKG:  < from: 12 Lead ECG (01.13.21 @ 21:43) >  Ventricular Rate 110 BPM    Atrial Rate 110 BPM    P-R Interval 164 ms    QRS Duration 84 ms    Q-T Interval 352 ms    QTC Calculation(Bazett) 476 ms    P Axis 27 degrees    R Axis 108 degrees    T Axis -8 degrees    Diagnosis Line Sinus tachycardia with Premature supraventricular complexes  Possible Right ventricular hypertrophy  Nonspecific ST-T changes  Abnormal ECG    Confirmed by Luke Malcolm ([id number]) on 1/13/2021 11:35:13 PM    < end of copied text >          11.Renal/Fluids/Electrolytes:    01-13    139  |  104  |  27<H>  ----------------------------<  180<H>  3.8   |  23  |  0.8    Ca    8.1<L>      13 Jan 2021 20:50    TPro  4.8<L>  /  Alb  3.1<L>  /  TBili  0.2  /  DBili  x   /  AST  25  /  ALT  16  /  Alk Phos  79  01-13            12.ID:  TMax:  Vital Signs Last 24 Hrs  T(C): 36.8 (13 Jan 2021 23:48), Max: 36.8 (13 Jan 2021 23:48)  T(F): 98.3 (13 Jan 2021 23:48), Max: 98.3 (13 Jan 2021 23:48)  HR: 127 (13 Jan 2021 23:48) (62 - 127)  BP: 145/67 (13 Jan 2021 23:48) (107/74 - 145/67)  BP(mean): --  RR: 16 (13 Jan 2021 23:48) (16 - 18)  SpO2: 92% (13 Jan 2021 23:48) (92% - 96%)          13.Hematology:                        11.1   9.62  )-----------( 282      ( 13 Jan 2021 20:50 )             33.9     COVID-19 PCR . (01.13.21 @ 22:36)   COVID-19 PCR: Not Detec:    Respiratory Viral Panel with COVID-19 by NAZARIO (10.28.20 @ 07:10)   Rapid RVP Result: NotDetec

## 2021-01-14 NOTE — ED ADULT NURSE NOTE - OBJECTIVE STATEMENT
Pt is a 80 yr old male Cantonese speaking c/o seizures. As per wife pt had a "long seizure" and started vomiting. NKDA. Pt lethargic on arrival.

## 2021-01-14 NOTE — DIETITIAN INITIAL EVALUATION ADULT. - CONTINUE CURRENT NUTRITION CARE PLAN
pt to consume and tolerate >75% of all meals and snacks upon f/u in 4 days. Pt to have 1BM/day.  Meals and snacks. RD to monitor diet order, energy intake, NFPF

## 2021-01-14 NOTE — DIETITIAN INITIAL EVALUATION ADULT. - REASON INDICATOR FOR ASSESSMENT
Found to be BMI<19 during RD screen - spoken with RN, pt refused  phone this morning. But ate 100% breakfast. When I spoken with him in both cantonese and mandarin, all pt responded me with was "hmm" as if he is acknowledging me but all he does is that to more than 5 of my questions. Mental status is likely altered. Unable to obtain any nutrition hx. Skin intact. No edema. LBM unknown. on DASH/TLC diet.

## 2021-01-14 NOTE — H&P ADULT - HISTORY OF PRESENT ILLNESS
Pt is a 81 yo male, Cantonese speaking with PMH of Lung ca (dx 05/2018) with mets to brain s/p resection (09/2019)  on chemotherapy- last chemo on Jan 5, HTN, BPH comes to the ED after seizure episode.   HPI obtained from wife at bedside. As per wife, pt had diffuse tonic-clonic episode that lasted couple min and resolved spontaneously. Pt had episode of nausea and vomiting afterwards. Pt had another repeat 2 episodes in ED as well that resolved spontaneously.  Pt was last     Was placed on keppra and steroids. Denies fever, chills, CP, SOB, diarrhea, dysuria.        Pt is a 81 yo male, Cantonese speaking with PMH of Lung ca (dx 05/2018) with mets to brain s/p resection (09/2019)  on chemotherapy- last chemo on Jan 5, HTN, BPH comes to the ED after seizure episode.   HPI obtained from wife at bedside. As per wife, pt had diffuse tonic-clonic episode that lasted couple min and resolved spontaneously. Pt had episode of nausea and vomiting afterwards. Pt had another repeat 2 episodes in ED as well that resolved spontaneously.  Pt was last admitted in Nov 2020 with status epilepticus requiring intubation and was discharged on keppra and dexamethasone.     VS on arrival otherwise stable. Admission labs WNL except troponemia        Pt is a 81 yo male, Cantonese speaking with PMH of Lung ca (dx 05/2018) with mets to brain s/p resection (09/2019)  on chemotherapy- last chemo on Jan 5, HTN, BPH comes to the ED after seizure episode.   HPI obtained from wife at bedside. As per wife, pt had diffuse tonic-clonic episode that lasted couple min and resolved spontaneously. Pt had episode of nausea and vomiting afterwards. Pt had another repeat 2 episodes in ED as well that resolved spontaneously.  Pt was last admitted in Nov 2020 with status epilepticus requiring intubation and was discharged on keppra and dexamethasone.     VS on arrival otherwise stable. Admission labs WNL except troponemia 0.04. EKG non ischemic.   Pt received Keppra and ativan in the ED.

## 2021-01-14 NOTE — DIETITIAN INITIAL EVALUATION ADULT. - OTHER INFO
hx of Lung cancer diagnosed in 05/2018, w/ mets to brain s/p resection 9/2019. on chemo, last dose was 1/5/2021. Neuro consulted. EHSAN. f/u with his heme/onc in [place].

## 2021-01-14 NOTE — SWALLOW BEDSIDE ASSESSMENT ADULT - COMMENTS
RN reports +toleration of breakfast this AM  [employer] [place]/English  #[id number] assisted w/ session.  Pt w/ no verbal responses, but replied "mhm" to comments/questions in both English & Cantonese.

## 2021-01-14 NOTE — CONSULT NOTE ADULT - ASSESSMENT
Impression:  81yo M with h/o lung ca with mets to brain on Keppra,  he walks with a walker and functional with ADLs at baseline, presents with a witnessed seizure while at home and had a witnessed 20 sec seizure in ED was given ativan 2mg IV and was loaded with Keppra 1gm. Wife states that patient is compliant with his seizure medications and his last seizure is probably few weeks ago. Patient is currently very lethargic only responding to pain stimuli, is  hemodynamically stable and protecting airway. COVID Negative.      Breakthrough Seizure in the setting of brain mets      Suggestions:  Admit to medical floor  Obtain REEG  Increase Keppra to 1gm bid  Seizure/ safety precautions  Check stat magnesium levels , replete if less than 2  UA urine culture, Chest x ray, ABG  Neuro attending note will follow       Impression:  79yo M with h/o lung ca with mets to brain s/p resection 9/2019 on Keppra, on chemotherapy- last treatment on Jan 5,  he walks with a walker and functional with ADLs at baseline, presents with 2 witnessed seizure while at home and a 20 sec seizure in ED was given ativan 2mg IV and was loaded with Keppra 1gm. Wife states that patient is compliant with his seizure medications and his last seizure is probably few weeks ago. Patient is currently very lethargic only responding to pain stimuli, is  hemodynamically stable and protecting airway. COVID Negative.      Breakthrough Seizure in the setting of brain mets      Suggestions:  Admit to medical floor  Obtain REEG  Increase Keppra to 1gm bid  Continue with decadron  Seizure/ safety precautions  Check stat magnesium levels , replete if less than 2  UA urine culture, Chest x ray, ABG  Neuro attending note will follow

## 2021-01-14 NOTE — CHART NOTE - NSCHARTNOTEFT_GEN_A_CORE
Spoke to and updated family (Leni-daughter) at bedside. She confirmed home dose keppra was 750mg BID. As per neuro to be increased to 1500MG BID.  Pt. does respond to questions by daughter but remains confused and incoherent.  Pt. was seen by neuro recommending VEEG, consent placed in chart.

## 2021-01-14 NOTE — H&P ADULT - ATTENDING COMMENTS
HPI:  Pt is a 81 yo male, Cantonese speaking with PMH of Lung ca (dx 05/2018) with mets to brain s/p resection (09/2019)  on chemotherapy- last chemo on Jan 5, HTN, BPH comes to the ED after seizure episode.   HPI obtained from wife at bedside. As per wife, pt had diffuse tonic-clonic episode that lasted couple min and resolved spontaneously. Pt had episode of nausea and vomiting afterwards. Pt had another repeat 2 episodes in ED as well that resolved spontaneously.  Pt was last admitted in Nov 2020 with status epilepticus requiring intubation and was discharged on keppra and dexamethasone.     VS on arrival otherwise stable. Admission labs WNL except troponemia 0.04. EKG non ischemic.   Pt received Keppra and ativan in the ED.        (14 Jan 2021 00:42)    REVIEW OF SYSTEMS:    CONSTITUTIONAL: No weakness, fevers or chills  EYES/ENT: No visual changes;  No vertigo or throat pain   NECK: No pain or stiffness  RESPIRATORY: No cough, wheezing, hemoptysis; No shortness of breath  CARDIOVASCULAR: No chest pain or palpitations  GASTROINTESTINAL: No abdominal or epigastric pain. No nausea, vomiting, or hematemesis; No diarrhea or constipation. No melena or hematochezia.  GENITOURINARY: No dysuria, frequency or hematuria  NEUROLOGICAL: No numbness or weakness  SKIN: No itching, rashes    Physical Exam:    General: WN/WD NAD  Neurology: A&Ox3, nonfocal, follows commands  Eyes: PERRLA/ EOMI  ENT/Neck: Neck supple, trachea midline, No JVD  Respiratory: CTA B/L, No wheezing, rales, rhonchi  CV: Normal rate regular rhythm, S1S2, no murmurs, rubs or gallops  Abdominal: Soft, NT, ND +BS,   Extremities: No edema, + peripheral pulses  Skin: No Rashes, Hematoma, Ecchymosis  Incisions: n/a  Tubes: n/a HPI:  Pt is a 79 yo male, Cantonese speaking with PMH of Lung ca (dx 05/2018) with mets to brain s/p resection (09/2019)  on chemotherapy- last chemo on Jan 5, HTN, BPH comes to the ED after seizure episode.   HPI obtained from wife at bedside. As per wife, pt had diffuse tonic-clonic episode that lasted couple min and resolved spontaneously. Pt had episode of nausea and vomiting afterwards. Pt had another repeat 2 episodes in ED as well that resolved spontaneously.  Pt was last admitted in Nov 2020 with status epilepticus requiring intubation and was discharged on keppra and dexamethasone.     VS on arrival otherwise stable. Admission labs WNL except troponemia 0.04. EKG non ischemic.   Pt received Keppra and ativan in the ED.        (14 Jan 2021 00:42)    REVIEW OF SYSTEMS:  CONSTITUTIONAL: No weakness, fevers or chills  EYES/ENT: No visual changes;  No vertigo or throat pain   NECK: No pain or stiffness  RESPIRATORY: No cough, wheezing, hemoptysis; No shortness of breath  CARDIOVASCULAR: No chest pain or palpitations  GASTROINTESTINAL: No abdominal or epigastric pain. No nausea, vomiting, or hematemesis; No diarrhea or constipation. No melena or hematochezia.  GENITOURINARY: No dysuria, frequency or hematuria  NEUROLOGICAL: No numbness or weakness, (+) seizure - witnessed at home and in ED  SKIN: No itching, rashes    Physical Exam:  General: WN/WD NAD  Neurology: Lethargic and responsive to pain, nonfocal, follows commands  Eyes: PERRLA/ EOMI  ENT/Neck: Neck supple, trachea midline, No JVD  Respiratory: CTA B/L, No wheezing, rales, rhonchi  CV: Normal rate regular rhythm, S1S2, no murmurs, rubs or gallops  Abdominal: Soft, NT, ND +BS,   Extremities: No edema, + peripheral pulses  Skin: No Rashes, Hematoma, Ecchymosis  Incisions: n/a  Tubes: n/a    A/p  Seizure w/ underlying metastatic brain lesions ( s/p resection)  -admit to Neuro floor  -PRN Ativan  -Loaded w/ Keppra and dose increased  -c/w Decadron  -Neurology on board  -REEG    Elevated Trop- non-cardiac presentation ( wife denies any cardiac complaints prior to seizure)  -repeat Trop x 1 and EKG  x1   -if no significant change will hold  further w/u    H/o Lung cancer   -f/u w/ Oncology     BPH -   -c/w current Rx    HTN -stable     DVT prophylaxis

## 2021-01-14 NOTE — H&P ADULT - NSHPLABSRESULTS_GEN_ALL_CORE
11.1   9.62  )-----------( 282      ( 13 Jan 2021 20:50 )             33.9       01-13    139  |  104  |  27<H>  ----------------------------<  180<H>  3.8   |  23  |  0.8    Ca    8.1<L>      13 Jan 2021 20:50    TPro  4.8<L>  /  Alb  3.1<L>  /  TBili  0.2  /  DBili  x   /  AST  25  /  ALT  16  /  AlkPhos  79  01-13           CARDIAC MARKERS ( 13 Jan 2021 20:50 )  x     / 0.04 ng/mL / x     / x     / x          < from: CT Head No Cont (01.13.21 @ 21:38) >    IMPRESSION:    No evidence of new acute intracranial hemorrhage, mass effect, or midline shift.    Multiple stable findings as above.    Note limitations of noncontrast head CT for evaluation of metastatic disease.    < end of copied text >      CAPILLARY BLOOD GLUCOSE  POCT Blood Glucose.: 192 mg/dL (13 Jan 2021 19:30)

## 2021-01-14 NOTE — CHART NOTE - NSCHARTNOTEFT_GEN_A_CORE
Upon Nutritional Assessment by the Registered Dietitian your patient was determined to meet criteria / has evidence of the following diagnosis/diagnoses:          [ ]  Mild Protein Calorie Malnutrition        [ ]  Moderate Protein Calorie Malnutrition        [ ] Severe Protein Calorie Malnutrition        [ ] Unspecified Protein Calorie Malnutrition        [ x] Underweight / BMI <19        [ ] Morbid Obesity / BMI > 40      Findings as based on:  •  Comprehensive nutrition assessment and consultation    Ht: 170.3cm  Wt: 53.1kg  BMI: 18.3      Treatment:    The following diet has been recommended:  (1) Continue DASH/TLC for now. Pt ate 100% breakfast today but needs a consistent trend to determine further. Pt is BMI<19 but unable to obtain further nutrition hx with patient due to possible altered metal status- Spoken with pt in both Mandarin and Cantonese. Not answering my questions coherently.        PROVIDER Section:     By signing this assessment you are acknowledging and agree with the diagnosis/diagnoses assigned by the Registered Dietitian    Comments:

## 2021-01-14 NOTE — H&P ADULT - NSHPPHYSICALEXAM_GEN_ALL_CORE
PHYSICAL EXAM:  GENERAL: NAD, well-developed, sleeping.   HEAD:  Atraumatic, Normocephalic  EYES: EOMI, PERRLA, conjunctiva and sclera clear  NECK: Supple, No JVD  CHEST/LUNG: Clear to auscultation bilaterally; No wheeze  HEART: Regular rate and rhythm; No murmurs, rubs, or gallops  ABDOMEN: Soft, Nontender, Nondistended; Bowel sounds present  EXTREMITIES:  2+ Peripheral Pulses, No clubbing, cyanosis, or edema  PSYCH: unable to assess.   NEUROLOGY: unable to assess.   SKIN: No rashes or lesions

## 2021-01-14 NOTE — DIETITIAN INITIAL EVALUATION ADULT. - ADD RECOMMEND
Continue DASH/TLC for now. Pt ate 100% breakfast today but needs a trend to determine consistent appetite. Pt is BMI<19 but unable to obtain further nutrition hx with patient due to possible altered metal status Spoken with pt in both Mandarin and Cantonese. Not answering my questions properly.

## 2021-01-15 NOTE — SWALLOW BEDSIDE ASSESSMENT ADULT - SWALLOW EVAL: FUNCTIONAL LEVEL AT TIME OF EVAL
easily arousable, nonverbal but with some vocal responses ("mhm"), confused (attempting to eat napkin, sheets between PO trials), wet respiratory sounds and occasional coughing at baseline
on RA

## 2021-01-15 NOTE — SWALLOW BEDSIDE ASSESSMENT ADULT - COMMENTS
pt received alert, on RA. in NAD. +verbal responses "ok', indicating toleration of current diet. +toleration with no overt s/s of aspiration/penetration

## 2021-01-15 NOTE — PROGRESS NOTE ADULT - SUBJECTIVE AND OBJECTIVE BOX
24H events:    Patient is a 80y old Male who presents with a chief complaint of Seizures (2021 09:19)    Primary diagnosis of Seizures       Today is hospital day 2d. This morning patient was seen and examined at bedside. No seizure overnight. Patient still confused. Pulled out his IV.    PAST MEDICAL & SURGICAL HISTORY  History of BPH    Seizure    Lung cancer      SOCIAL HISTORY:  Social History:      ALLERGIES:  No Known Allergies    MEDICATIONS:  STANDING MEDICATIONS  carvedilol 3.125 milliGRAM(s) Oral every 12 hours  chlorhexidine 4% Liquid 1 Application(s) Topical <User Schedule>  dexAMETHasone     Tablet 4 milliGRAM(s) Oral every 8 hours  diVALproex  milliGRAM(s) Oral every 12 hours  enoxaparin Injectable 40 milliGRAM(s) SubCutaneous at bedtime  finasteride 5 milliGRAM(s) Oral daily  levETIRAcetam 1500 milliGRAM(s) Oral two times a day  pantoprazole    Tablet 40 milliGRAM(s) Oral before breakfast  tamsulosin 0.4 milliGRAM(s) Oral at bedtime    PRN MEDICATIONS    VITALS:   T(F): 96.8  HR: 85  BP: 139/79  RR: 18  SpO2: --    PHYSICAL EXAM:  GENERAL: lying in bed, closing his eyes  HEAD:  Atraumatic, Normocephalic  EYES: EOMI  NECK: Supple  NERVOUS SYSTEM:  conscious, not oriented to self, place, or time. Doesn't follow commands.  CHEST/LUNG: Clear to auscultation bilaterally; No rales, rhonchi, wheezing, or rubs  HEART: Regular rate and rhythm; No murmurs, rubs, or gallops  ABDOMEN: Soft, Nontender, Nondistended; Bowel sounds present  EXTREMITIES:  2+ Peripheral Pulses, No clubbing, cyanosis, or edema  LYMPH: No lymphadenopathy noted  SKIN: No rashes or lesions  LABS:                        11.7   14.00 )-----------( 245      ( 15 Jaspal 2021 04:30 )             36.3     01-15    140  |  103  |  20  ----------------------------<  80  4.1   |  28  |  0.5<L>    Ca    7.7<L>      15 Jaspal 2021 04:30  Mg     2.3     01-15    TPro  4.7<L>  /  Alb  3.1<L>  /  TBili  0.8  /  DBili  x   /  AST  21  /  ALT  22  /  AlkPhos  70  01-15      Urinalysis Basic - ( 2021 04:20 )    Color: Light Yellow / Appearance: Clear / S.015 / pH: x  Gluc: x / Ketone: Negative  / Bili: Negative / Urobili: <2 mg/dL   Blood: x / Protein: Negative / Nitrite: Negative   Leuk Esterase: Negative / RBC: x / WBC x   Sq Epi: x / Non Sq Epi: x / Bacteria: x        CARDIAC MARKERS ( 2021 04:30 )  x     / 0.09 ng/mL / x     / x     / x      CARDIAC MARKERS ( 2021 20:50 )  x     / 0.04 ng/mL / x     / x     / x          RADIOLOGY:    CT Head No Cont (21 @ 21:38) >  No evidence of new acute intracranial hemorrhage, mass effect, or midline shift.  Multiple stable findings as above.  Note limitations of noncontrast head CT for evaluation of metastatic disease.      Xray Chest 1 View-PORTABLE IMMEDIATE (21 @ 21:20) >  Left lung mass. Mediastinal lymphadenopathy. Supportdevices as described.  Without change.

## 2021-01-15 NOTE — SWALLOW BEDSIDE ASSESSMENT ADULT - SLP PERTINENT HISTORY OF CURRENT PROBLEM
Pt adm w/ sz episodes.  Pt last adm in Nov 2020 w/ status epilecticus, intubated/extubated, d/c on keppra & dexamethasone.  Hx lung ca & brain mets, resected 9/2019. CT: no acute changes, demonstrating post surgical changes from L frontal craniotomy & L frontal encephalomalacia
Pt is a 81 yo male, Cantonese speaking with PMH of Lung ca (dx 05/2018) with mets to brain s/p resection (09/2019)  on chemotherapy- last chemo on Jan 5, HTN, BPH comes to the ED after seizure episode.   Hx lung ca & brain mets, resected 9/2019. CT: no acute changes, demonstrating post surgical changes from L frontal craniotomy & L frontal encephalomalacia

## 2021-01-15 NOTE — SWALLOW BEDSIDE ASSESSMENT ADULT - SWALLOW EVAL: ORAL MUSCULATURE
unable to assess due to poor participation/comprehension
generally intact/unable to assess due to poor participation/comprehension

## 2021-01-15 NOTE — PROGRESS NOTE ADULT - SUBJECTIVE AND OBJECTIVE BOX
Neurology Progress Note    Interval History:      Patient seen in f/u for AMS and concern for seizures.  vEEG shows abnormalities but not electrographic seizure.    Patient remains nonverbal and confused.  He is back on levetiracetam 1500 mg bid and   now valproic acid 500 gm bid with a level of 64.    Vital Signs Last 24 Hrs  T(C): 35.8 (15 Jaspal 2021 13:40), Max: 37 (2021 21:28)  T(F): 96.4 (15 Jaspal 2021 13:40), Max: 98.6 (2021 21:28)  HR: 89 (15 Jaspal 2021 13:40) (85 - 89)  BP: 109/71 (15 Jaspal 2021 13:40) (109/71 - 139/79)  BP(mean): --  RR: 18 (15 Jaspal 2021 05:13) (18 - 18)  SpO2: --    Neurological Exam:   Patients eyes closed.  Pupils equal and reactive to light.  Does not blink to threat.  He withdraws to pain in extremities and groans.    MEDICATIONS  (STANDING):  carvedilol 3.125 milliGRAM(s) Oral every 12 hours  chlorhexidine 4% Liquid 1 Application(s) Topical <User Schedule>  dexAMETHasone     Tablet 4 milliGRAM(s) Oral every 8 hours  diVALproex  milliGRAM(s) Oral every 12 hours  enoxaparin Injectable 40 milliGRAM(s) SubCutaneous at bedtime  finasteride 5 milliGRAM(s) Oral daily  levETIRAcetam 1500 milliGRAM(s) Oral two times a day  pantoprazole    Tablet 40 milliGRAM(s) Oral before breakfast  tamsulosin 0.4 milliGRAM(s) Oral at bedtime      Labs:  CBC Full  -  ( 15 Ajspal 2021 04:30 )  WBC Count : 14.00 K/uL  RBC Count : 3.87 M/uL  Hemoglobin : 11.7 g/dL  Hematocrit : 36.3 %  Platelet Count - Automated : 245 K/uL  Mean Cell Volume : 93.8 fL  Mean Cell Hemoglobin : 30.2 pg  Mean Cell Hemoglobin Concentration : 32.2 g/dL  Auto Neutrophil # : 11.27 K/uL  Auto Lymphocyte # : 2.04 K/uL  Auto Monocyte # : 0.39 K/uL  Auto Eosinophil # : 0.00 K/uL  Auto Basophil # : 0.05 K/uL  Auto Neutrophil % : 80.4 %  Auto Lymphocyte % : 14.6 %  Auto Monocyte % : 2.8 %  Auto Eosinophil % : 0.0 %  Auto Basophil % : 0.4 %    01-15    140  |  103  |  20  ----------------------------<  80  4.1   |  28  |  0.5<L>    Ca    7.7<L>      15 Jaspal 2021 04:30  Mg     2.3     01-15    TPro  4.7<L>  /  Alb  3.1<L>  /  TBili  0.8  /  DBili  x   /  AST  21  /  ALT  22  /  AlkPhos  70  01-15    LIVER FUNCTIONS - ( 15 Jaspal 2021 04:30 )  Alb: 3.1 g/dL / Pro: 4.7 g/dL / ALK PHOS: 70 U/L / ALT: 22 U/L / AST: 21 U/L / GGT: x             Urinalysis Basic - ( 2021 04:20 )    Color: Light Yellow / Appearance: Clear / S.015 / pH: x  Gluc: x / Ketone: Negative  / Bili: Negative / Urobili: <2 mg/dL   Blood: x / Protein: Negative / Nitrite: Negative   Leuk Esterase: Negative / RBC: x / WBC x   Sq Epi: x / Non Sq Epi: x / Bacteria: x        Assessment:  This is a 80y Male w/ h/o brain mets and bifrontal encephalomalacia, poorly responsive on dual anticonvulsants w/o evidence of electrographic seizure on vEEG.      Plan:  1.  Continue vEEG.  2.  If fails to improve over next 24-48 hours CSF studes and MRI w/wo contrast would be reasonable.  3.  Send levetiracetam trough level tomorrow morning and repeat valproic acid trough.  4.  Keep mg > 2.0.  5.  Call neurology for any witness seizures.

## 2021-01-15 NOTE — PROGRESS NOTE ADULT - ATTENDING COMMENTS
Patient seen and examined independently earlier today. Case discussed with housestaff, nursing, case mgmt. I agree with most of the resident's note, physical exam, and plan except as below. Patient feels better. No complaints. Denies CP, SOB, AP. Remainder ROS unremarkable.    Gen: NAD, alert but confused  HEENT: EOMI, no LAD  CV: nl S1 S2  Resp: decreased BS b/l  GI: NT/ND/S +BS  MS: no c/c/e, +pulses  Neuro: grossly moving all extremities, +reflexes    #Encephalopathy: ?due to seizures. On VEEG. If no improvement, might need MRI brain +/-LP  #Seizures: AEDs as per neuro. Cont VEEG  #Lung cancer w/ brain mets: cont steroids  #HTN/BPH: cont home meds    High risk pt. Px is guarded.    #Progress Note Handoff  Pending (specify):  Consults_Neuro___Clinical improvement and stability__x__Tests__VEEG___PT_x___  Disposition: Home______SNF_______4A______To be determined___x_    My note supersedes the residents note should a discrepancy arise.

## 2021-01-15 NOTE — PROGRESS NOTE ADULT - ASSESSMENT
79 yo male, Cantonese speaking with PMH of Lung ca (dx 05/2018) with mets to brain s/p resection (09/2019)  on chemotherapy- last chemo on Jan 5, HTN, BPH comes to the ED after seizure episode.     #) Seizures with h/o Brain mets s/p Resection  - HD stable, 3 episodes on admission that spontaneously resolved  - CT head with stable post surgical changes, might need MRI if concern for new mets  - s/p Ativan and keppra in the ED  - Valproic Acid Level, Serum: 64.0 ug/mL (01.15.21 @ 04:30)  - Keppra level ordered and pending  - Plan for VEEG   - c/w Keppra and valproic acid and Dexamethasone for now  - Follows with Dr Wasserman in [facility]    #) Troponemia- Doubt ACS  - no active chest pain  - EKG non ischemic      #) H/o Lung Ca  - Left side Mass on CXR  - On chemotherapy  - Not on O2    #) h/o BPH  - c/w home meds- flomax, finasteride.     #) h/o HTN  - c/w coreg    #) diet- regular dash, speech swallow eval  #) DVT ppx- Lovenox  #) GI ppx- protonix  #) dispo- acute    79 yo male, Cantonese speaking with PMH of Lung ca (dx 05/2018) with mets to brain s/p resection (09/2019)  on chemotherapy- last chemo on Jan 5, HTN, BPH comes to the ED after seizure episode.     #) Seizures with h/o Brain mets s/p Resection  - HD stable, 3 episodes on admission that spontaneously resolved  - CT head with stable post surgical changes, might need MRI if concern for new mets  - s/p Ativan and keppra in the ED  - Valproic Acid Level, Serum: 64.0 ug/mL (01.15.21 @ 04:30)  - Keppra level ordered and pending  - Plan for VEEG   - c/w Keppra and valproic acid and Dexamethasone for now  - Follows with Dr Wasserman in [facility]      #Leukocytosis  -Likely related to seizure and steroid  -Chest xray no infiltrates  -Will get blood cx    #) Troponemia- Doubt ACS  - no active chest pain  - EKG non ischemic      #) H/o Lung Ca  - Left side Mass on CXR  - On chemotherapy  - Not on O2    #) h/o BPH  - c/w home meds- flomax, finasteride.     #) h/o HTN  - c/w coreg    #) diet-  Eat more vegetables and fruits.  Swap refined grains for whole grains.  Choose fat-free or low-fat dairy products.  Choose lean protein sources like fish, poultry and beans.  Cook with vegetable oils.  Limit your intake of foods high in added sugars, like soda and candy.  #) DVT ppx- Lovenox  #) GI ppx- protonix  #) dispo- acute

## 2021-01-15 NOTE — EEG REPORT - NS EEG TEXT BOX
Epilepsy Attending Note:     EMANUEL CAMPBELL    80y Male  MRN [id number]    Vital Signs Last 24 Hrs  T(C): 36 (15 Jaspal 2021 05:13), Max: 37 (14 Jan 2021 21:28)  T(F): 96.8 (15 Jaspal 2021 05:13), Max: 98.6 (14 Jan 2021 21:28)  HR: 85 (15 Jaspal 2021 05:13) (85 - 86)  BP: 139/79 (15 Jaspal 2021 05:13) (125/74 - 139/79)  BP(mean): --  RR: 18 (15 Jaspal 2021 05:13) (18 - 18)  SpO2: --                          11.7   14.00 )-----------( 245      ( 15 Jaspal 2021 04:30 )             36.3       01-15    140  |  103  |  20  ----------------------------<  80  4.1   |  28  |  0.5<L>    Ca    7.7<L>      15 Jaspal 2021 04:30  Mg     2.3     01-15    TPro  4.7<L>  /  Alb  3.1<L>  /  TBili  0.8  /  DBili  x   /  AST  21  /  ALT  22  /  AlkPhos  70  01-15      MEDICATIONS  (STANDING):  carvedilol 3.125 milliGRAM(s) Oral every 12 hours  chlorhexidine 4% Liquid 1 Application(s) Topical <User Schedule>  dexAMETHasone     Tablet 4 milliGRAM(s) Oral every 8 hours  diVALproex  milliGRAM(s) Oral every 12 hours  enoxaparin Injectable 40 milliGRAM(s) SubCutaneous at bedtime  finasteride 5 milliGRAM(s) Oral daily  levETIRAcetam 1500 milliGRAM(s) Oral two times a day  pantoprazole    Tablet 40 milliGRAM(s) Oral before breakfast  tamsulosin 0.4 milliGRAM(s) Oral at bedtime    MEDICATIONS  (PRN):      Valproic Acid Level, Serum: 64.0 ug/mL [50.0 - 100.0] (01-15-21 @ 04:30)        VEEG in the last 24 hours:      Focal and generalized slowing - moderate to severe left hemispheric slowing    Interictal activity: brief left hemispheric semirhythmic spikes and after going slow disturbing the back ground    Events - none    Seizures - as above    Impression: Abnormal VEEG as above.Consistent with diffuse cerebral electrophysiological dysfunction secondary to none specific cause. Consistent with focal electrophysiological dysfunction secondary to structural/metabolic cause. Consistent with potential for partial seizure from LEFT hemisphere.      Plan - Discussed with neurology team.

## 2021-01-15 NOTE — SWALLOW BEDSIDE ASSESSMENT ADULT - SWALLOW EVAL: DIAGNOSIS
tolerating all trials however demonstrating post-PO belching
+toleration for regular, thins with no overt s/s of aspiration/penetration. +belching post thins

## 2021-01-15 NOTE — SWALLOW BEDSIDE ASSESSMENT ADULT - PHARYNGEAL PHASE
wet respiratory sounds and occasional cough at baseline, no significant increase noted during PO, no other overt symptoms of penetration/aspiration noted at bedside
Within functional limits

## 2021-01-15 NOTE — SWALLOW BEDSIDE ASSESSMENT ADULT - NS SPL SWALLOW CLINIC TRIAL FT
tolerating all trials however demonstrating post-PO belching
tolerating all trials however demonstrating post-PO belching

## 2021-01-16 NOTE — PROGRESS NOTE ADULT - SUBJECTIVE AND OBJECTIVE BOX
24H events:    Patient is a 80y old Male who presents with a chief complaint of Seizures (14 Jan 2021 09:19)    Primary diagnosis of Seizures     Today is hospital day 3d. This morning patient was seen and examined at bedside. No seizure overnight. Patient still confused. But looks more alert.    PAST MEDICAL & SURGICAL HISTORY  History of BPH    Seizure    Lung cancer      SOCIAL HISTORY:  Social History:      ALLERGIES:  No Known Allergies    MEDICATIONS:  STANDING MEDICATIONS  carvedilol 3.125 milliGRAM(s) Oral every 12 hours  chlorhexidine 4% Liquid 1 Application(s) Topical <User Schedule>  dexAMETHasone     Tablet 4 milliGRAM(s) Oral every 8 hours  diVALproex  milliGRAM(s) Oral every 12 hours  enoxaparin Injectable 40 milliGRAM(s) SubCutaneous at bedtime  finasteride 5 milliGRAM(s) Oral daily  levETIRAcetam 1500 milliGRAM(s) Oral two times a day  pantoprazole    Tablet 40 milliGRAM(s) Oral before breakfast  tamsulosin 0.4 milliGRAM(s) Oral at bedtime    PRN MEDICATIONS    VITALS:   T(F): 96.1  HR: 72  BP: 112/78  RR: 16  SpO2: --    PHYSICAL EXAM:  GENERAL: lying in bed, closing his eyes  HEAD:  Atraumatic, Normocephalic  EYES: EOMI  NECK: Supple  NERVOUS SYSTEM: conscious, not oriented to self, place, or time. Doesn't follow commands.  CHEST/LUNG: Clear to auscultation bilaterally; No rales, rhonchi, wheezing, or rubs  HEART: Regular rate and rhythm; No murmurs, rubs, or gallops  ABDOMEN: Soft, Nontender, Nondistended; Bowel sounds present  EXTREMITIES:  2+ Peripheral Pulses, No clubbing, cyanosis, or edema  LYMPH: No lymphadenopathy noted  SKIN: No rashes or lesions  LABS:                        11.7   14.00 )-----------( 245      ( 15 Jaspal 2021 04:30 )             36.3     01-15    140  |  103  |  20  ----------------------------<  80  4.1   |  28  |  0.5<L>    Ca    7.7<L>      15 Jaspal 2021 04:30  Mg     2.3     01-15    TPro  4.7<L>  /  Alb  3.1<L>  /  TBili  0.8  /  DBili  x   /  AST  21  /  ALT  22  /  AlkPhos  70  01-15      RADIOLOGY:        CT Head No Cont (01.13.21 @ 21:38) >  No evidence of new acute intracranial hemorrhage, mass effect, or midline shift.  Multiple stable findings as above.  Note limitations of noncontrast head CT for evaluation of metastatic disease.      Xray Chest 1 View-PORTABLE IMMEDIATE (01.13.21 @ 21:20) >  Left lung mass. Mediastinal lymphadenopathy. Supportdevices as described.  Without change.

## 2021-01-16 NOTE — PROGRESS NOTE ADULT - ATTENDING COMMENTS
Pt examined on 1/16 and vEEG showed some improvement.  Clinically patient improved, speaking now and following commands.  Continue anticonvulsants.  Discontinue vEEG.  Recheck anticonvulsant levels.

## 2021-01-16 NOTE — PROGRESS NOTE ADULT - ASSESSMENT
80y Male w/ h/o brain mets and bifrontal encephalomalacia, poorly responsive on dual anticonvulsants w/o evidence of electrographic seizure on vEEG.  Currently stable without clinical seizures overnight.    Plan:  F/u on  vEEG results  continue Keppra and valproic acid   Keep mg > 2.0.  Call neurology for any witness seizures.        Neuroattending note will follow

## 2021-01-16 NOTE — PROGRESS NOTE ADULT - ATTENDING COMMENTS
Michelle Jackson MD  Hospitalist   Spectra: [phone]    Patient is a 80y old  Male who presents with a chief complaint of Seizures (16 Jan 2021 10:21)      INTERVAL HPI/OVERNIGHT EVENTS: no acute overnight events noted   patient is awake and calm but confused on exam   non related answers on questions asked     REVIEW OF SYSTEMS: negative  Vital Signs Last 24 Hrs  T(C): 36.8 (16 Jan 2021 14:02), Max: 36.8 (16 Jan 2021 14:02)  T(F): 98.3 (16 Jan 2021 14:02), Max: 98.3 (16 Jan 2021 14:02)  HR: 80 (16 Jan 2021 14:02) (72 - 88)  BP: 117/68 (16 Jan 2021 14:02) (106/63 - 117/68)  BP(mean): --  RR: 18 (16 Jan 2021 14:02) (16 - 18)  SpO2: --    PHYSICAL EXAM:   NAD; Normocephalic;   LUNGS - no wheezing  HEART: S1 S2+   ABDOMEN: Soft, Nontender, non distended  EXTREMITIES: no cyanosis; no edema  NERVOUS SYSTEM:  Awake, non focal     LABS:                        11.7   14.00 )-----------( 245      ( 15 Jaspal 2021 04:30 )             36.3     01-15    140  |  103  |  20  ----------------------------<  80  4.1   |  28  |  0.5<L>    Ca    7.7<L>      15 Jaspal 2021 04:30  Mg     2.3     01-15    TPro  4.7<L>  /  Alb  3.1<L>  /  TBili  0.8  /  DBili  x   /  AST  21  /  ALT  22  /  AlkPhos  70  01-15        CAPILLARY BLOOD GLUCOSE          A/P:-  Pt is seen and evaluated by bedside.   # acute toxic metabolic Encephalopathy:   - ?due to seizures vs brain mets. On VEEG  - not tolerating - pulling probes. If no improvement, might need MRI brain +/-LP  - follow up with neuro for further recs     #Seizures:   AEDs as per neuro. Cont VEEG  follow drug troughs     #Lung cancer w/ brain mets: cont steroids    #HTN/BPH: cont home   Dispo: home when stable   Plan of care was discussed with patient ; all questions and concerns were addressed and care was aligned with patient's wishes.

## 2021-01-16 NOTE — PROGRESS NOTE ADULT - ASSESSMENT
81 yo male, Cantonese speaking with PMH of Lung ca (dx 05/2018) with mets to brain s/p resection (09/2019)  on chemotherapy- last chemo on Jan 5, HTN, BPH comes to the ED after seizure episode.     #) Seizures with h/o Brain mets s/p Resection  - Had 3 episodes of of witnessed seizures on admission  -Patient keeps on pulling EEG/ VEEG leads  - CT head with stable post surgical changes, might need MRI if concern for new mets  - on keppra and valproic acid  - Valproic Acid Level, Serum: 64.0 ug/mL (01.15.21 @ 04:30)  - Keppra level ordered and pending  - c/w Keppra and valproic acid and Dexamethasone for now  - Follows with Dr Wasserman in [facility]  - Neurology on board: of no improvement within 48H then consider MRI head +/- LP    #Leukocytosis  -Likely related to seizure and steroid  -Chest xray no infiltrates  -Blood cx pending    #) Troponemia- Doubt ACS  - no active chest pain  - EKG non ischemic      #) H/o Lung Ca  - Left side Mass on CXR  - On chemotherapy  - Not on O2    #) h/o BPH  - c/w home meds- flomax, finasteride.     #) h/o HTN  - c/w coreg    #) diet-  DASH  #) DVT ppx- Lovenox  #) GI ppx- protonix  #) dispo- acute

## 2021-01-16 NOTE — PROGRESS NOTE ADULT - SUBJECTIVE AND OBJECTIVE BOX
Neurology Progress Note    Interval History:  patient is examined at the bedside, around 2 am he pulled of his VEEG leads, currently he is calm, awakes to voice and follows one step commands, speaks in Cantonese.    HPI:  Pt is a 81 yo male, Cantonese speaking with PMH of Lung ca (dx 05/2018) with mets to brain s/p resection (09/2019)  on chemotherapy- last chemo on Jan 5, HTN, BPH comes to the ED after seizure episode.   HPI obtained from wife at bedside. As per wife, pt had diffuse tonic-clonic episode that lasted couple min and resolved spontaneously. Pt had episode of nausea and vomiting afterwards. Pt had another repeat 2 episodes in ED as well that resolved spontaneously.  Pt was last admitted in Nov 2020 with status epilepticus requiring intubation and was discharged on keppra and dexamethasone.     VS on arrival otherwise stable. Admission labs WNL except troponemia 0.04. EKG non ischemic.   Pt received Keppra and ativan in the ED.        (14 Jan 2021 00:42)      PAST MEDICAL & SURGICAL HISTORY:  History of BPH    Seizure    Lung cancer            Medications:  carvedilol 3.125 milliGRAM(s) Oral every 12 hours  chlorhexidine 4% Liquid 1 Application(s) Topical <User Schedule>  dexAMETHasone     Tablet 4 milliGRAM(s) Oral every 8 hours  diVALproex  milliGRAM(s) Oral every 12 hours  enoxaparin Injectable 40 milliGRAM(s) SubCutaneous at bedtime  finasteride 5 milliGRAM(s) Oral daily  levETIRAcetam 1500 milliGRAM(s) Oral two times a day  pantoprazole    Tablet 40 milliGRAM(s) Oral before breakfast  tamsulosin 0.4 milliGRAM(s) Oral at bedtime      Vital Signs Last 24 Hrs  T(C): 35.7 (15 Jaspal 2021 20:29), Max: 35.8 (15 Jaspal 2021 13:40)  T(F): 96.3 (15 Jaspal 2021 20:29), Max: 96.4 (15 Jaspal 2021 13:40)  HR: 88 (15 Jaspal 2021 20:29) (88 - 89)  BP: 106/63 (15 Jaspal 2021 20:29) (106/63 - 109/71)  BP(mean): --  RR: 18 (15 Jaspal 2021 20:29) (18 - 18)  SpO2: --    Neurological Exam:   Mental status: Awake, alert and oriented x2 Follows 2 step commands in cantonese  Cranial nerves: Pupils equally round and reactive to light, visual fields full, no nystagmus, extraocular muscles intact, V1 through V3 intact bilaterally and symmetric, face symmetric, hearing intact to finger rub, palate elevation symmetric, tongue was midline.  Motor:  MRC grading 5/5 b/l UE/LE.   strength 5/5.  Normal tone and bulk.  No abnormal movements.    Sensation: Intact to light touch, proprioception, and pinprick.   Reflexes: 2+ in bilateral UE/LE, downgoing toes bilaterally. (-) Barger.      Labs:  CBC Full  -  ( 15 Jaspal 2021 04:30 )  WBC Count : 14.00 K/uL  RBC Count : 3.87 M/uL  Hemoglobin : 11.7 g/dL  Hematocrit : 36.3 %  Platelet Count - Automated : 245 K/uL  Mean Cell Volume : 93.8 fL  Mean Cell Hemoglobin : 30.2 pg  Mean Cell Hemoglobin Concentration : 32.2 g/dL  Auto Neutrophil # : 11.27 K/uL  Auto Lymphocyte # : 2.04 K/uL  Auto Monocyte # : 0.39 K/uL  Auto Eosinophil # : 0.00 K/uL  Auto Basophil # : 0.05 K/uL  Auto Neutrophil % : 80.4 %  Auto Lymphocyte % : 14.6 %  Auto Monocyte % : 2.8 %  Auto Eosinophil % : 0.0 %  Auto Basophil % : 0.4 %    01-15    140  |  103  |  20  ----------------------------<  80  4.1   |  28  |  0.5<L>    Ca    7.7<L>      15 Jaspal 2021 04:30  Mg     2.3     01-15    TPro  4.7<L>  /  Alb  3.1<L>  /  TBili  0.8  /  DBili  x   /  AST  21  /  ALT  22  /  AlkPhos  70  01-15    LIVER FUNCTIONS - ( 15 Jaspal 2021 04:30 )  Alb: 3.1 g/dL / Pro: 4.7 g/dL / ALK PHOS: 70 U/L / ALT: 22 U/L / AST: 21 U/L / GGT: x                 Assessment:  This is a 80y Male w/ h/o     Plan:

## 2021-01-17 NOTE — PROGRESS NOTE ADULT - SUBJECTIVE AND OBJECTIVE BOX
Michelle Jackson MD  Hospitalist   Spectra: [phone]    Patient is a 80y old  Male who presents with a chief complaint of Seizures (16 Jan 2021 10:21)   ID: [id number]    INTERVAL HPI/OVERNIGHT EVENTS: no acute overnight events noted   during encounter patient is awake and calm   when asked if he knows where is he - he keeps replying that [place] in [place] and this is [place]  reports feeling fine     REVIEW OF SYSTEMS: negative  Vital Signs Last 24 Hrs  T(C): 35.6 (17 Jan 2021 14:11), Max: 36.8 (16 Jan 2021 20:33)  T(F): 96.1 (17 Jan 2021 14:11), Max: 98.3 (16 Jan 2021 20:33)  HR: 70 (17 Jan 2021 14:11) (57 - 73)  BP: 123/72 (17 Jan 2021 14:11) (121/68 - 146/74)  BP(mean): --  RR: 17 (17 Jan 2021 14:11) (17 - 18)  SpO2: --    PHYSICAL EXAM:   NAD; Normocephalic;   LUNGS - no wheezing  HEART: S1 S2+   ABDOMEN: Soft, Nontender, non distended  EXTREMITIES: no cyanosis; no edema  NERVOUS SYSTEM:  Awake and alert; no focal neuro deficits appreciated    LABS:                        10.8   13.34 )-----------( 220      ( 17 Jan 2021 04:30 )             34.1     01-17    139  |  107  |  27<H>  ----------------------------<  112<H>  3.8   |  23  |  0.6<L>    Ca    7.9<L>      17 Jan 2021 04:30  Mg     2.2     01-17    TPro  4.2<L>  /  Alb  2.5<L>  /  TBili  0.3  /  DBili  x   /  AST  16  /  ALT  14  /  AlkPhos  65  01-17

## 2021-01-17 NOTE — PROGRESS NOTE ADULT - ASSESSMENT
A/P:-  Pt is seen and evaluated by bedside.   # acute toxic metabolic Encephalopathy:   - ?due to seizures vs brain mets.   - EEG - Consistent with diffuse cerebral electrophysiological dysfunction secondary to none specific cause. Consistent with focal electrophysiological dysfunction secondary to structural/metabolic cause. Consistent with potential for partial seizure from LEFT hemisphere.  - on depakote and keppra   - follow levels   - mri with/without iv contrast   - follow up with neuro for further recs     #Seizures:   AEDs as per neuro.  follow drug troughs     #Lung cancer w/ brain mets: cont steroids    #HTN/BPH: cont home     Dispo: home when stable

## 2021-01-18 NOTE — PROGRESS NOTE ADULT - ASSESSMENT
79 yo male, Cantonese speaking with PMH of Lung ca (dx 05/2018) with mets to brain s/p resection (09/2019)  on chemotherapy- last chemo on Jan 5, HTN, BPH comes to the ED after seizure episode.     #) Seizures with h/o Brain mets s/p Resection  - Had 3 episodes of of witnessed seizures on admission  - VEEG showed focal cerebral dysfunction in left frontotemporal region  - CT head with stable post surgical changes, might need MRI if concern for new mets  - on keppra and valproic acid  - Valproic Acid Level, Serum: 64.0 ug/mL (01.15.21 @ 04:30)  - Keppra level 57  - c/w Keppra and valproic acid and Dexamethasone for now  - Follows with Dr Wasserman in [facility]  - Neurology on board: of no improvement within 48H then consider MRI head +/- LP  - MRI head today, premedication with ativan 1mg IV once    #Leukocytosis  - improving  -Likely related to seizure and steroid  -Chest xray no infiltrates  -Blood cx NGTD    #) Troponemia- Doubt ACS  - no active chest pain  - EKG non ischemic    #) H/o Lung Ca  - Left side Mass on CXR  - On chemotherapy  - Not on O2    #) h/o BPH  - c/w home meds  - flomax, finasteride.   -TOV today    #) h/o HTN  - c/w coreg    #) diet-  DASH  #) DVT ppx- Lovenox  #) GI ppx- protonix  #) dispo- acute

## 2021-01-18 NOTE — PROGRESS NOTE ADULT - SUBJECTIVE AND OBJECTIVE BOX
24H events:    Patient is a 80y old Male who presents with a chief complaint of Seizures (14 Jan 2021 09:19)    Primary diagnosis of Seizures     Today is hospital day 5d. This morning patient was seen and examined at bedside. No seizure overnight. Patient still confused. But looks more alert.    PAST MEDICAL & SURGICAL HISTORY  History of BPH    Seizure    Lung cancer      SOCIAL HISTORY:  Social History:      ALLERGIES:  No Known Allergies    MEDICATIONS:  STANDING MEDICATIONS  carvedilol 3.125 milliGRAM(s) Oral every 12 hours  chlorhexidine 4% Liquid 1 Application(s) Topical <User Schedule>  dexAMETHasone     Tablet 4 milliGRAM(s) Oral every 8 hours  diVALproex  milliGRAM(s) Oral every 12 hours  enoxaparin Injectable 40 milliGRAM(s) SubCutaneous at bedtime  finasteride 5 milliGRAM(s) Oral daily  levETIRAcetam 1500 milliGRAM(s) Oral two times a day  LORazepam   Injectable 1 milliGRAM(s) IV Push once  pantoprazole    Tablet 40 milliGRAM(s) Oral before breakfast  tamsulosin 0.4 milliGRAM(s) Oral at bedtime    PRN MEDICATIONS    VITALS:   T(F): 97.4  HR: 65  BP: 133/77  RR: 18  SpO2: --    PHYSICAL EXAM:  GENERAL :looks comfortable, eating breakfast  HEAD:  Atraumatic, Normocephalic  EYES: EOMI  NECK: Supple  NERVOUS SYSTEM: conscious, not oriented to self, place, or time. Doesn't follow commands.  CHEST/LUNG: Clear to auscultation bilaterally; No rales, rhonchi, wheezing, or rubs  HEART: Regular rate and rhythm; No murmurs, rubs, or gallops  ABDOMEN: Soft, Nontender, Nondistended; Bowel sounds present  EXTREMITIES:  2+ Peripheral Pulses, No clubbing, cyanosis, or edema  LYMPH: No lymphadenopathy noted  SKIN: No rashes or lesions  LABS:                        11.5   12.10 )-----------( 193      ( 18 Jan 2021 04:30 )             36.1     01-18    140  |  105  |  25<H>  ----------------------------<  115<H>  4.3   |  24  |  0.6<L>    Ca    7.9<L>      18 Jan 2021 04:30  Mg     2.2     01-17    TPro  4.2<L>  /  Alb  2.5<L>  /  TBili  0.3  /  DBili  x   /  AST  16  /  ALT  14  /  AlkPhos  65  01-17              Culture - Blood (collected 15 Jaspal 2021 21:20)  Source: .Blood Blood-Venous  Preliminary Report (17 Jan 2021 03:04):    No growth to date.    Culture - Blood (collected 15 Jaspal 2021 21:20)  Source: .Blood Blood-Venous  Preliminary Report (17 Jan 2021 03:04):    No growth to date.          RADIOLOGY:

## 2021-01-18 NOTE — PROGRESS NOTE ADULT - ATTENDING COMMENTS
Patient seen and examined w/ Cantonese  earlier today (pt speaks both Cantonese and Mandarin). Case discussed with housestaff, nursing, case mgmt. I agree with most of the resident's note, physical exam, and plan except as below. Patient feels well. No complaints. Denies CP, SOB, AP. Remainder ROS unremarkable.    Gen: NAD, AA0x1+, follows commands  HEENT: EOMI, no LAD  CV: nl S1 S2  Resp: decreased BS b/l  GI: NT/ND/S +BS  MS: no c/c/e, +pulses  Neuro: grossly moving all extremities, +reflexes    #Encephalopathy: ?due to seizures. Cont AEDs. Check MRI brain, neuro f/u (as per family was ?fully functional)  #Seizures: AEDs as per neuro. Cont VEEG  #Lung cancer w/ brain mets: cont steroids  #HTN/BPH: cont home meds    High risk pt. Px is guarded.    #Progress Note Handoff  Pending (specify):  Consults_Neuro___Clinical improvement and stability__x__Tests__MRI___PT_x___  Disposition: Home______SNF_______4A______To be determined___x_    My note supersedes the residents note should a discrepancy arise.      35 minutes spent on total encounter; more than 50% of the visit was spent counseling and/or coordinating care by the attending physician.

## 2021-01-18 NOTE — CHART NOTE - NSCHARTNOTEFT_GEN_A_CORE
Registered Dietitian Follow-Up    ***Scroll to the bottom for RD recommendation***    Patient Profile Reviewed                           Yes [x]   No []  Nutrition History Previously Obtained        Yes []  No [x]          PERTINENT SUBJECTIVE INFORMATION:  - spoken with patient today, appears AOx1 (able to speak both Cantonese and Mandarin), only know his name, confused about place, president, and time. Able to follow commands per attending at bedside. RN also spoken with, consumed ~50% of breakfast.  - Contacted son at [phone] (speaks Cantonese). Son reported that I contacted him last time pt was in ICU back in Oct/2020. UBW at that was was 112#, now 117#, likely stable. Since home from that admission pt was eating mostly well (fluctuating amount), no vitamin, takes Ensure/Boost only when needed. NKFA. Prefers Chinese foods, and PTA mental status was normal under medications.          PERTINENT MEDICAL INFORMATIONS:  (1) p/w acute toxic metabolic encephalopathy. ?d/t seizure v.s. brain mets  (2) s/p EEG consistent w/ diffuse cerebral electrophysiologically dysfunction on abx  (3) s/p MRI. f/u neuro recs.   (4) ARDS per neuro. Seizure.   (5) c/w meds for lung cancer w/ brain mets.           DIET ORDER:   DASH/TLC        ANTHROPOMETRICS:  - Ht.  170.3cm  - Wt.   (1/14): 53.1kg - no new weight  - BMI. 18.3  - IBW       PERTINENT LAB DATA:  1/18: h/h 11.5/36.1, WBC 12.1, BUN 25, Cr 0.6, Glucose 115, Ca 7.9, Albumin 2.5  PERTINENT MEDS: enoxaparin, carvedilol, decadron, protonix,       PHYSICAL FINDINGS  - APPEARANCE:        AOx1, confused, no edema noted  - GI FUNCTION:        LBM 1/18 per EMR  - TUBES:                       - ORAL/MOUTH:      none reported  - SKIN:                        skin intact         NUTRITION REQUIREMENTS  WEIGHT USED:                          ABW: 53.1kg   ESTIMATED ENERGY NEEDS:       CONTINUE [ x  ]      ADJUST [  ]  - from 1/14 note    ESTIMATED ENERGY NEEDS:         0661-2478 kcal/day (MSJ x 1.2-1.4)  ESTIMATED PROTEIN NEEDS:         58-74 g/day (1.1-1.4 g/kg of ABW)  ESTIMATED FLUID NEEDS:             1ml/kcal or per LIP    CURRENT NUTRIENT NEEDS:      not meeting, only ~50% meals.          [  ] PREVIOUS NUTRITION DIAGNOSIS:                [  ] ONGOING        [  ] RESOLVED    NUTRITION DIAGNOSTIC #1 (NEW)  PROBLEM:                   (1) inadequate oral intake  ETIOLOGY:                   decreased po likely 2/2 personal and cultural food preference  SIGN/SYMPTOMS:      pt consuming 50% of meals per staff report      PATIENT INTERVENTION:    [x  ] ORAL        [ ] EN/TF     GOAL/EXPECTED OUTCOME:     pt to consume and tolerate >50% of all meals and rec'd supplements upon f/u in 3 days.   INDICATOR/MONITORING:       RD to monitor diet order, energy intake, body composition, nutrition focused physical findings  NUTRITION INTERVENTION:        Meals and snacks. Medical food supplements        RECS: (1)   Please add ENSURE ENLIVE q24hr and Ensure Pudding q24hr to current DASH/TLC diet. BMI <19 is accurate after obtaining pt's nutrition hx from son. Pt likely eating less than normal due to cultural food preference.

## 2021-01-19 NOTE — PHYSICAL THERAPY INITIAL EVALUATION ADULT - PERTINENT HX OF CURRENT PROBLEM, REHAB EVAL
Pt is a 81 y/o male admitted for Pt is a 81 yo male, Cantonese speaking with PMH of Lung ca (dx 05/2018) with mets to brain s/p resection (09/2019)  on chemotherapy- last chemo on Jan 5, HTN, BPH comes to the ED after seizure episode.

## 2021-01-19 NOTE — PROGRESS NOTE ADULT - ATTENDING COMMENTS
Patient seen and examined earlier today. Case discussed with housestaff, nursing, case mgmt, neurology. I agree with most of the resident's note, physical exam, and plan except as below. Patient feels well. No complaints. Denies CP, SOB, AP. Remainder ROS unremarkable.    Gen: NAD, AA0x1+, follows commands  HEENT: EOMI, no LAD  CV: nl S1 S2  Resp: decreased BS b/l  GI: NT/ND/S +BS  MS: no c/c/e, +pulses  Neuro: grossly moving all extremities, +reflexes    < from: MR Head w/wo IV Cont (01.18.21 @ 14:50) >  Abnormal signal in the left frontal lobe and extra-axial fluid collection beneath the craniotomy site overall appear smaller likely representing resolving postoperative changes.  New small (0.4 cm) enhancing lesion in the left temporal lobe, likely representing metastatic disease in view of the patient's stated clinical history of metastatic.    #Toxic Metabolic Encephalopathy NOS: Unclear whether due to seizures vs new brain lesion or a combination. Cont AEDs. Neuro wants to taper off Depakote. Seizure precautions  #Seizures w/ Status epilepticus POA: AEDs as per neuro. Cont VEEG  #Lung cancer w/ more brain mets: cont steroids w/ gradual taper. D/w pt's oncologist Dr. Wasserman in [place], he plans to send pt to Dr. Paul for more gamma knife treatment.   #HTN/BPH: cont home meds    High risk pt. Px is guarded.  Full code for now (will d/w daughter [facility])    #Progress Note Handoff  Pending (specify):  Consults_Neuro___Clinical improvement and stability__x__Tests__MRI___PT_x___  Disposition: Home______SNF_______4A______To be determined___x_    My note supersedes the residents note should a discrepancy arise.      35 minutes spent on total encounter; more than 50% of the visit was spent counseling and/or coordinating care by the attending physician.

## 2021-01-19 NOTE — CHART NOTE - NSCHARTNOTEFT_GEN_A_CORE
Patient oncologist was spoken with and because of lethargy his keppra was decreased to 750mg BID.  As he is confused will lower his keppra however will need to start on another AED.  As depakote may have led to encephalopathy and the elevated ammonia level.    Plan  1. decrease keppra to 1000mg BID x2 days then 750mg BID thereafter  2. Add vimpat 50mg BID x24 hours (2 doses) then 100mg BID thereafter  3. Can lower decadron to 2mg BID

## 2021-01-19 NOTE — PROGRESS NOTE ADULT - SUBJECTIVE AND OBJECTIVE BOX
24H events:    Patient is a 80y old Male who presents with a chief complaint of Seizures (14 Jan 2021 09:19)    Primary diagnosis of Seizures     Today is hospital day 6d. This morning patient was seen and examined at bedside. No seizure overnight. Patient still confused. But looks more alert.    PAST MEDICAL & SURGICAL HISTORY  History of BPH    Seizure    Lung cancer      SOCIAL HISTORY:  Social History:      ALLERGIES:  No Known Allergies    MEDICATIONS:  STANDING MEDICATIONS  carvedilol 3.125 milliGRAM(s) Oral every 12 hours  chlorhexidine 4% Liquid 1 Application(s) Topical <User Schedule>  dexAMETHasone     Tablet 4 milliGRAM(s) Oral every 8 hours  diVALproex  milliGRAM(s) Oral two times a day  enoxaparin Injectable 40 milliGRAM(s) SubCutaneous at bedtime  finasteride 5 milliGRAM(s) Oral daily  levETIRAcetam 1500 milliGRAM(s) Oral two times a day  LORazepam   Injectable 1 milliGRAM(s) IV Push once  pantoprazole    Tablet 40 milliGRAM(s) Oral before breakfast  tamsulosin 0.4 milliGRAM(s) Oral at bedtime    PRN MEDICATIONS    VITALS:   T(F): 96.3  HR: 69  BP: 118/76  RR: 18  SpO2: 97%    PHYSICAL EXAM:  GENERAL :looks comfortable, eating breakfast  HEAD:  Atraumatic, Normocephalic  EYES: EOMI  NECK: Supple  NERVOUS SYSTEM: conscious, not oriented to self, place, or time. follows commands.  CHEST/LUNG: Clear to auscultation bilaterally; No rales, rhonchi, wheezing, or rubs  HEART: Regular rate and rhythm; No murmurs, rubs, or gallops  ABDOMEN: Soft, Nontender, Nondistended; Bowel sounds present  EXTREMITIES:  2+ Peripheral Pulses, No clubbing, cyanosis, or edema  LYMPH: No lymphadenopathy noted  SKIN: No rashes or lesions  LABS:                        11.4   16.50 )-----------( 196      ( 19 Jan 2021 04:30 )             36.1     01-19    139  |  105  |  24<H>  ----------------------------<  119<H>  4.7   |  24  |  0.7    Ca    7.9<L>      19 Jan 2021 04:30  Mg     2.1     01-19    TPro  4.3<L>  /  Alb  2.6<L>  /  TBili  0.3  /  DBili  x   /  AST  16  /  ALT  11  /  AlkPhos  66  01-19        RADIOLOGY:         MR Head w/wo IV Cont (01.18.21 @ 14:50) >  In comparison with the prior MRI of the brain dated November 1, 2020:  The current examination is limited by motion artifact.  Abnormal signal in the left frontal lobe and extra-axial fluid collection beneath the craniotomy site overall appear smaller likely representing resolving postoperative changes.  New small (0.4 cm) enhancing lesion in the left temporal lobe, likely representing metastatic disease in view of the patient's stated clinical history of metastatic.  Mild progression of corpus callosal atrophy.

## 2021-01-19 NOTE — PROGRESS NOTE ADULT - ASSESSMENT
81 yo male, Cantonese speaking with PMH of Lung ca (dx 05/2018) with mets to brain s/p resection (09/2019)  on chemotherapy- last chemo on Jan 5, HTN, BPH comes to the ED after seizure episode.     #) Seizures with h/o Brain mets s/p Resection   - Likely secondary to recurrent brain mets  - Had 3 episodes of of witnessed seizures on admission  - VEEG showed focal cerebral dysfunction in left frontotemporal region  - CT head with stable post surgical changes, might need MRI if concern for new mets  - MRI brain showed 0.6 cm new lesion at the left frontal lobe  - on keppra and valproic acid  - Valproic Acid Level, Serum: 64.0 ug/mL (01.15.21 @ 04:30)  - Keppra level 57  - c/w Keppra and Dexamethasone for now  - Decrease valproic acid to 250 mg bid then stop it   - Follows with Dr Wasserman in [facility]  - c/s onco-radiology  - will contact his oncologist regarding recurrence of mets    #Leukocytosis  -Likely related to steroid  -Chest xray no infiltrates  -Blood cx NGTD  -Mcghee removed    #) Troponemia- Doubt ACS  - no active chest pain  - EKG non ischemic    #) H/o Lung Ca  - Left side Mass on CXR  - On chemotherapy  - Not on O2  - will follow up with his oncologist for recurrent brain mets    #) h/o BPH  - c/w home meds  - flomax, finasteride.   -TOV today    #) h/o HTN  - c/w coreg    #) diet-  DASH  #) DVT ppx- Lovenox  #) GI ppx- protonix

## 2021-01-19 NOTE — CDI QUERY NOTE - NSCDIOTHERTXTBX_GEN_ALL_CORE_HH
Dr. Sapp,    81 y/o male admitted with seizures and AMS.  Patient has known brain mets    Progress notes indicate:  Acute toxic metabolic encephalopathy ? due to  seizure vs. brain mets  Please clarify the type of encephalopathy:    -Metabolic encephalopathy  -Toxic encephalopathy (adverse effect of medication, please specify the medication)  -Other encephalopathy  -Other (please specify)  -Clinically unable to determine    Thank you,  Lay Corona

## 2021-01-20 NOTE — PROGRESS NOTE ADULT - SUBJECTIVE AND OBJECTIVE BOX
24H events:    Patient is a 80y old Male who presents with a chief complaint of Seizures (2021 09:19)    Primary diagnosis of Seizures     Today is hospital day 6d. This morning patient was seen and examined at bedside. No seizure overnight. Patient still confused. But looks more alert. [employer]  used ID number:[id number]    PAST MEDICAL & SURGICAL HISTORY  History of BPH    Seizure    Lung cancer      SOCIAL HISTORY:  Social History:      ALLERGIES:  No Known Allergies    MEDICATIONS:  STANDING MEDICATIONS  carvedilol 3.125 milliGRAM(s) Oral every 12 hours  chlorhexidine 4% Liquid 1 Application(s) Topical <User Schedule>  dexAMETHasone     Tablet 2 milliGRAM(s) Oral every 12 hours  enoxaparin Injectable 40 milliGRAM(s) SubCutaneous at bedtime  finasteride 5 milliGRAM(s) Oral daily  lacosamide 100 milliGRAM(s) Oral two times a day  levETIRAcetam 750 milliGRAM(s) Oral two times a day  LORazepam   Injectable 1 milliGRAM(s) IV Push once  pantoprazole    Tablet 40 milliGRAM(s) Oral before breakfast  tamsulosin 0.4 milliGRAM(s) Oral at bedtime    PRN MEDICATIONS    VITALS:   T(F): 96.5  HR: 72  BP: 103/55  RR: 18  SpO2: 98%    PHYSICAL EXAM:  GENERAL :looks comfortable was sleeping. woke him up  HEAD:  Atraumatic, Normocephalic  EYES: EOMI  NECK: Supple  NERVOUS SYSTEM: conscious, not oriented to self, place, or time, but follows commands.  CHEST/LUNG: Clear to auscultation bilaterally; No rales, rhonchi, wheezing, or rubs  HEART: Regular rate and rhythm; No murmurs, rubs, or gallops  ABDOMEN: Soft, Nontender, Nondistended; Bowel sounds present  EXTREMITIES:  2+ Peripheral Pulses, No clubbing, cyanosis, or edema  LYMPH: No lymphadenopathy noted  SKIN: No rashes or lesions  LABS:                        11.4   16.50 )-----------( 196      ( 2021 04:30 )             36.1     -    139  |  105  |  24<H>  ----------------------------<  119<H>  4.7   |  24  |  0.7    Ca    7.9<L>      2021 04:30  Mg     2.1         TPro  4.3<L>  /  Alb  2.6<L>  /  TBili  0.3  /  DBili  x   /  AST  16  /  ALT  11  /  AlkPhos  66        Urinalysis Basic - ( 2021 08:34 )    Color: Light Yellow / Appearance: Clear / S.026 / pH: x  Gluc: x / Ketone: Trace  / Bili: Negative / Urobili: <2 mg/dL   Blood: x / Protein: Trace / Nitrite: Negative   Leuk Esterase: Negative / RBC: x / WBC x   Sq Epi: x / Non Sq Epi: x / Bacteria: x                RADIOLOGY:

## 2021-01-20 NOTE — PROGRESS NOTE ADULT - CONVERSATION DETAILS
Daughter is aware of progression of pt's disease. She is also aware of terminal nature of the disease. She wants to think more about DNR/DNI before making the decision.

## 2021-01-20 NOTE — PROGRESS NOTE ADULT - ASSESSMENT
79 yo male, Cantonese speaking with PMH of Lung ca (dx 05/2018) with mets to brain s/p resection (09/2019)  on chemotherapy- last chemo on Jan 5, HTN, BPH comes to the ED after seizure episode.     #) Seizures with h/o Brain mets s/p Resection   - Likely secondary to recurrent brain mets vs less likely secondary to seizure  - Patient remains AOx1 not improving secondary to seizure vs mets vs medication induced  - Had 3 episodes of witnessed seizures on admission  - VEEG showed focal cerebral dysfunction in left frontotemporal region  - CT head with stable post surgical changes, might need MRI if concern for new mets  - MRI brain showed 0.6 cm new lesion at the left frontal lobe  - Was on keppra and valproic acid  - Keppra level 57  - Valproic acid stopped as per neurology recommendation. Decrease keppra to 750mg bid, add Vimpat 50mg for 2 dosed (received) then 100 mg  q12.  - Decrease dexamethasone to 2 mg Iv q 12h as it may worsens confusion and causes psychosis  - Follows with Dr Wasserman in [facility]. Informed about his condition, Was due to chemotherapy today. Plan for follow up with Dr lyles for gamma knife treatment    #Leukocytosis  -Likely related to steroid  -Chest xray no infiltrates  -Blood cx NGTD  -U/A negative    #Failed TOV /hx of BPH  -s/p you catheter  -on tamsulosin and finasteride      #) H/o Lung Ca  - Left side Mass on CXR  - On chemotherapy  - Not on O2  - will follow up with his oncologist for recurrent brain mets. Plan for gamma knife radiation as out patient      #) h/o HTN  - c/w coreg    #) diet-  DASH  #) DVT ppx- Lovenox  #) GI ppx- protonix  #)anticipate for DC on friday

## 2021-01-20 NOTE — PROGRESS NOTE ADULT - ATTENDING COMMENTS
Patient seen and examined earlier today. Case discussed with housestaff, nursing, case mgmt, neurology, daughter. I agree with most of the resident's note, physical exam, and plan except as below. Patient feels well. No complaints. Denies CP, SOB, AP. Remainder ROS unremarkable.    Gen: NAD, AA0x1+, follows commands  HEENT: EOMI, no LAD  CV: nl S1 S2  Resp: decreased BS b/l  GI: NT/ND/S +BS  MS: no c/c/e, +pulses  Neuro: grossly moving all extremities, +reflexes    < from: MR Head w/wo IV Cont (01.18.21 @ 14:50) >  Abnormal signal in the left frontal lobe and extra-axial fluid collection beneath the craniotomy site overall appear smaller likely representing resolving postoperative changes.  New small (0.4 cm) enhancing lesion in the left temporal lobe, likely representing metastatic disease in view of the patient's stated clinical history of metastatic.    #Toxic Metabolic Encephalopathy NOS: Unclear whether due to seizures vs new brain lesion vs AED side effect or a combination. Cont AEDs as per neuro. Tapered off Depakote and started on Vimpat. Seizure precautions  #Seizures w/ Status epilepticus POA: AEDs as per neuro.   #Lung cancer w/ more brain mets: cont steroids w/ gradual taper. D/w pt's oncologist Dr. Wasserman in [place], he plans to send pt to Dr. Paul for more gamma knife treatment. Pt was scheduled for chemo on 1/19  #HTN/BPH/Urinary retension: cont home meds, you    Dispo: neuro is hoping pt's mental status will improve in the next few days.   D/w daughter in details w/ the help of [place]  service.    High risk pt. Px is guarded.  Full code for now    #Progress Note Handoff  Pending (specify):  Consults_Neuro___Clinical improvement and stability__x__Tests__PT_x___  Disposition: Home______SNF_______4A______To be determined___x_    My note supersedes the residents note should a discrepancy arise.      60 minutes spent on total encounter; more than 50% of the visit was spent counseling and/or coordinating care by the attending physician.

## 2021-01-20 NOTE — PROGRESS NOTE ADULT - CONVERSATION/DISCUSSION
Diagnosis/Prognosis/MOLST Discussed/Treatment Options/Holistic/Hospice Referral/Palliative Care Referral

## 2021-01-21 NOTE — DISCHARGE NOTE NURSING/CASE MANAGEMENT/SOCIAL WORK - PATIENT PORTAL LINK FT
You can access the FollowMyHealth Patient Portal offered by [facility] by registering at the following website: [email or web address]. By joining [facility]’s FollowMyHealth portal, you will also be able to view your health information using other applications (apps) compatible with our system.

## 2021-01-21 NOTE — PROGRESS NOTE ADULT - ASSESSMENT
81 yo male, Cantonese speaking with PMH of Lung ca (dx 05/2018) with mets to brain s/p resection (09/2019)  on chemotherapy- last chemo on Jan 5, HTN, BPH comes to the ED after seizure episode.     #) Seizures with h/o Brain mets s/p Resection   - Likely secondary to recurrent brain mets vs less likely secondary to seizure  - Patient remains AOx1 not improving secondary to seizure vs mets vs medication induced  - Had 3 episodes of witnessed seizures on admission  - VEEG showed focal cerebral dysfunction in left frontotemporal region  - CT head with stable post surgical changes, might need MRI if concern for new mets  - MRI brain showed 0.6 cm new lesion at the left frontal lobe  - Was on keppra and valproic acid  - Keppra level 57  - Valproic acid stopped as per neurology recommendation. Decrease keppra to 750mg bid, add Vimpat 50mg for 2 dosed (received) then 100 mg  q12.  - Decrease dexamethasone to 2 mg Iv q 12h as it may worsens confusion and causes psychosis  - Follows with Dr Wasserman in [facility]. Informed about his condition, Was due to chemotherapy today. Plan for follow up with Dr lyles for gamma knife treatment    #Leukocytosis  -WBC 21K  -No clear focus for infection, Likely related to steroid  -Will obtain procalcitonin  -Chest xray no infiltrates  -Blood cx NGTD  -U/A negative  -ID consult    #Failed TOV /hx of BPH  -s/p you catheter   -on tamsulosin and finasteride  -Will do TOV today    #) H/o Lung Ca  - Left side Mass on CXR  - On chemotherapy  - Not on O2  - Will follow up with his oncologist for recurrent brain mets. Plan for gamma knife radiation as out patient    #) h/o HTN  - c/w coreg    #) diet-  DASH  #) DVT ppx- Lovenox  #) GI ppx- protonix  #)Anticipate for DC on friday

## 2021-01-21 NOTE — CONSULT NOTE ADULT - ASSESSMENT
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, brain mets    PRECAUTIONS: [    ] Cardiac  [    ] Respiratory  [    ] Seizures [    ] Contact Isolation  [    ] Droplet Isolation  [    ] Other    Weight Bearing Status: wbat    RECOMMENDATION:    Out of Bed to Chair     DVT/Decubiti Prophylaxis    REHAB PLAN:     [  x   ] Bedside P/T 3-5 times a week   [   x  ] Bedside O/T  2-3 times a week   [     ] No Rehab Therapy Indicated   [     ]  Speech Therapy   Conditioning/ROM                                 ADL  Bed Mobility                                            Conditioning/ROM  Transfers                                                  Bed Mobility  Sitting /Standing Balance                      Transfers                                        Gait Training                                            Sitting/Standing Balance  Stair Training [   ]Applicable                 Home equipment Eval                                                                     Splinting  [   ] Only      GOALS:   ADL   [    ]   Independent         Transfers  [    ] Independent            Ambulation  [     ] Independent     [   x  ] With device                            [x    ]  CG                                               [ x   ]  CG                                                    [ x   ] CG                            [    ] Min A                                          [    ] Min A                                                [     ] Min  A          DISCHARGE PLAN:   [     ]  Good candidate for Intensive Rehabilitation/Hospital based-4A [facility]                                             Will tolerate 3hrs Intensive Rehab Daily                                       [      ]  Short Term Rehab in Skilled Nursing Facility                                       [   x   ]  Home with Outpatient or VN services                                         [      ]  Possible Candidate for Intensive Hospital based Rehab

## 2021-01-21 NOTE — CHART NOTE - NSCHARTNOTEFT_GEN_A_CORE
Registered Dietitian Follow-Up    ***Scroll to the bottom for RD recommendation***    Patient Profile Reviewed                           Yes [x]   No []  Nutrition History Previously Obtained        Yes [x]  No []          PERTINENT SUBJECTIVE INFORMATION:  - spoken with son previously. Attending on the floor and I spoke with patient at bedside, to assess pt's mental status and inform him with goal of care. If patient works with PT and improves, possible d/c tomorrow 1/22.  - Otherwise, pt is 1:1 feed and likely eating ~50% per meals per EMR documentation.   - for me, speaking with pt in Cantonese, pt still mostly confused, likely AOx1-2, poor historian, still stating he is in [place] somewhere, but unable to tell me if he is at home/work/ other places.   - last RD recs not taken x1  - RN on break.          PERTINENT MEDICAL INFORMATIONS:  (1) p/w acute toxic metabolic encephalopathy. ?d/t seizure v.s. brain mets  (2) s/p EEG consistent w/ diffuse cerebral electrophysiologically dysfunction on abx  (3) s/p MRI. f/u neuro recs.   (4) ARDS per neuro. Seizure. adjust meds.  (5) c/w meds for lung cancer w/ brain mets.           DIET ORDER:   DASH/TLC        ANTHROPOMETRICS:  - Ht.  170.3cm  - Wt.   (1/14): 53.1kg - no new weight  - BMI. 18.3  - IBW       PERTINENT LAB DATA:  1/21: h/h 11.5/35.8, BUN 24, Cr 0.6, Albumin 2.5, Ca 7.7  PERTINENT MEDS:    enoxaparin, decadron, carvedilol, protonix       PHYSICAL FINDINGS  - APPEARANCE:        AOx1-2, confused mostly and a poor historian, no edema noted  - GI FUNCTION:        LBM 1/19 per EMR.   - TUBES:                       - ORAL/MOUTH:      none reported  - SKIN:                        skin intact         NUTRITION REQUIREMENTS  WEIGHT USED:                          ABW: 53.1kg   ESTIMATED ENERGY NEEDS:       CONTINUE [ x  ]      ADJUST [  ]  - from 1/14 note    ESTIMATED ENERGY NEEDS:         8852-1590 kcal/day (MSJ x 1.2-1.4)  ESTIMATED PROTEIN NEEDS:         58-74 g/day (1.1-1.4 g/kg of ABW)  ESTIMATED FLUID NEEDS:             1ml/kcal or per LIP    CURRENT NUTRIENT NEEDS:      not meeting, only ~50% meals.          [ x ] PREVIOUS NUTRITION DIAGNOSIS:    (1) inadequate oral intake            [  x] ONGOING        [  ] RESOLVED          PATIENT INTERVENTION:    [x  ] ORAL        [ ] EN/TF     GOAL/EXPECTED OUTCOME:     pt to consume and tolerate >50% of all meals and rec'd supplements upon f/u in 4 days.   INDICATOR/MONITORING:       RD to monitor diet order, energy intake, body composition, nutrition focused physical findings  NUTRITION INTERVENTION:        Meals and snacks. Medical food supplements        RECS: (1)  Same recs as previously, please add ENSURE ENLIVE q24hr and Ensure Pudding q24hr to current DASH/TLC diet.  Pt likely eating less than normal due to cultural food preference and current mental status.

## 2021-01-21 NOTE — PROGRESS NOTE ADULT - SUBJECTIVE AND OBJECTIVE BOX
24H events:    Patient is a 80y old Male who presents with a chief complaint of Seizures (2021 09:19)    Primary diagnosis of Seizures     Today is hospital day 6d. This morning patient was seen and examined at bedside. No seizure overnight. Patient still confused. But looks more alert. [employer]  used ID number:[id number]    PAST MEDICAL & SURGICAL HISTORY  History of BPH    Seizure    Lung cancer      SOCIAL HISTORY:  Social History:      ALLERGIES:  No Known Allergies    MEDICATIONS:  STANDING MEDICATIONS  carvedilol 3.125 milliGRAM(s) Oral every 12 hours  chlorhexidine 4% Liquid 1 Application(s) Topical <User Schedule>  dexAMETHasone     Tablet 2 milliGRAM(s) Oral every 12 hours  enoxaparin Injectable 40 milliGRAM(s) SubCutaneous at bedtime  finasteride 5 milliGRAM(s) Oral daily  lacosamide 100 milliGRAM(s) Oral two times a day  levETIRAcetam 750 milliGRAM(s) Oral two times a day  LORazepam   Injectable 1 milliGRAM(s) IV Push once  pantoprazole    Tablet 40 milliGRAM(s) Oral before breakfast  tamsulosin 0.4 milliGRAM(s) Oral at bedtime    PRN MEDICATIONS    VITALS:   T(F): 96.1  HR: 67  BP: 118/67  RR: 20  SpO2: --    PHYSICAL EXAM:  GENERAL :looks comfortable was sleeping. Woke him up.  HEAD:  Atraumatic, Normocephalic  EYES: EOMI  NECK: Supple  NERVOUS SYSTEM: Patient looks more alert today. He is alert to self and others, place, his age, but not to time. Non focal, follows commands.  CHEST/LUNG: Clear to auscultation bilaterally; No rales, rhonchi, wheezing, or rubs  HEART: Regular rate and rhythm; No murmurs, rubs, or gallops  ABDOMEN: Soft, Nontender, Nondistended; Bowel sounds present  EXTREMITIES:  2+ Peripheral Pulses, No clubbing, cyanosis, or edema  LYMPH: No lymphadenopathy noted  SKIN: No rashes or lesions  LABS:                        11.5   21.44 )-----------( 156      ( 2021 05:48 )             35.8     01-    138  |  106  |  24<H>  ----------------------------<  93  4.3   |  22  |  0.6<L>    Ca    7.7<L>      2021 05:48  Mg     2.0         TPro  4.1<L>  /  Alb  2.5<L>  /  TBili  0.2  /  DBili  x   /  AST  21  /  ALT  10  /  AlkPhos  64        Urinalysis Basic - ( 2021 08:34 )    Color: Light Yellow / Appearance: Clear / S.026 / pH: x  Gluc: x / Ketone: Trace  / Bili: Negative / Urobili: <2 mg/dL   Blood: x / Protein: Trace / Nitrite: Negative   Leuk Esterase: Negative / RBC: x / WBC x   Sq Epi: x / Non Sq Epi: x / Bacteria: x        RADIOLOGY:       MR Head w/wo IV Cont (21 @ 14:50) >  In comparison with the prior MRI of the brain dated 2020:  The current examination is limited by motion artifact.  Abnormal signal in the left frontal lobe and extra-axial fluid collection beneath the craniotomy site overall appear smaller likely representing resolving postoperative changes.  New small (0.4 cm) enhancing lesion in the left temporal lobe, likely representing metastatic disease in view of the patient's stated clinical history of metastatic.  Mild progression of corpus callosal atrophy.

## 2021-01-21 NOTE — PROGRESS NOTE ADULT - ATTENDING COMMENTS
Patient seen and examined earlier today w/ Cantonese . Case discussed with housestaff, nursing, case mgmt, neurology. I agree with most of the resident's note, physical exam, and plan except as below. Patient without complaints. Denies CP, SOB, AP. Remainder ROS unremarkable.    Gen: NAD, AA0x1+, follows commands  HEENT: EOMI, no LAD  CV: nl S1 S2  Resp: decreased BS b/l  GI: NT/ND/S +BS  MS: no c/c/e, +pulses  Neuro: grossly moving all extremities, +reflexes    < from: MR Head w/wo IV Cont (01.18.21 @ 14:50) >  Abnormal signal in the left frontal lobe and extra-axial fluid collection beneath the craniotomy site overall appear smaller likely representing resolving postoperative changes.  New small (0.4 cm) enhancing lesion in the left temporal lobe, likely representing metastatic disease in view of the patient's stated clinical history of metastatic.    #Toxic Metabolic Encephalopathy NOS: Unclear whether due to seizures vs new brain lesion vs AED side effect or a combination. Cont AEDs as per neuro. Tapered off Depakote and started on Vimpat. Seizure precautions  #Seizures w/ Status epilepticus POA: AEDs as per neuro.   #Lung cancer w/ more brain mets: cont steroids w/ gradual taper. D/w pt's oncologist Dr. Wasserman in [place], he plans to send pt to Dr. Paul for more gamma knife treatment. Pt was scheduled for chemo on 1/19  #HTN/BPH/Urinary retension: cont home meds, voiding trial    Dispo: neuro is hoping pt's mental status will improve in the next few days.     High risk pt. Px is guarded.  Full code for now    #Progress Note Handoff  Pending (specify):  Consults_Neuro___Clinical improvement and stability__x__Tests__PT_x___  Disposition: Home______SNF_______4A______To be determined___x_    My note supersedes the residents note should a discrepancy arise.      35 minutes spent on total encounter; more than 50% of the visit was spent counseling and/or coordinating care by the attending physician.       Electronic Signatures:  Awada, Zeinab (MD)  (Signed 20-Jaspal-2021 15:30)  	Authored: Progress Note, Reason for Admission, Subjective and Objective, Assessment and Plan  Atul Sapp)  (Signed 20-Jan-2021 16:03)  	Authored: Goals of Care, Attending Attestation Patient seen and examined earlier today w/ Cantonese . Case discussed with housestaff, nursing, case mgmt, neurology. I agree with most of the resident's note, physical exam, and plan except as below. Patient without complaints. Denies CP, SOB, AP. Remainder ROS unremarkable.    Gen: NAD, AA0x1+, follows commands  HEENT: EOMI, no LAD  CV: nl S1 S2  Resp: decreased BS b/l  GI: NT/ND/S +BS  MS: no c/c/e, +pulses  Neuro: grossly moving all extremities, +reflexes    < from: MR Head w/wo IV Cont (01.18.21 @ 14:50) >  Abnormal signal in the left frontal lobe and extra-axial fluid collection beneath the craniotomy site overall appear smaller likely representing resolving postoperative changes.  New small (0.4 cm) enhancing lesion in the left temporal lobe, likely representing metastatic disease in view of the patient's stated clinical history of metastatic.    #Toxic Metabolic Encephalopathy NOS: Unclear whether due to seizures vs new brain lesion vs AED side effect or a combination. Cont AEDs as per neuro. Tapered off Depakote and started on Vimpat. Seizure precautions  #Seizures w/ Status epilepticus POA: AEDs as per neuro.   #Leukocytosis. ?Etiology: steroids being tapered, all Cx, CXR unremarkable. Check procal level, repeat CBC in am  #Lung cancer w/ more brain mets: cont steroids w/ gradual taper. D/w pt's oncologist Dr. Wasserman in [place], he plans to send pt to Dr. Paul for more gamma knife treatment. Pt was scheduled for chemo on 1/19  #HTN/BPH/Urinary retension: cont home meds, voiding trial    Dispo: neuro is hoping pt's mental status will improve in the next few days.     High risk pt. Px is guarded.  Full code for now    #Progress Note Handoff  Pending (specify):  Consults_Neuro___Clinical improvement and stability__x__Tests__PT_x___  Disposition: Home______SNF_______4A______To be determined___x_    My note supersedes the residents note should a discrepancy arise.      35 minutes spent on total encounter; more than 50% of the visit was spent counseling and/or coordinating care by the attending physician.

## 2021-01-21 NOTE — CONSULT NOTE ADULT - SUBJECTIVE AND OBJECTIVE BOX
HPI: info from chart  Pt is a 81 yo male, Cantonese speaking with PMH of Lung ca (dx 2018) with mets to brain s/p resection (2019)  on chemotherapy- last chemo on , HTN, BPH comes to the ED after seizure episode.   HPI obtained from wife at bedside. As per wife, pt had diffuse tonic-clonic episode that lasted couple min and resolved spontaneously. Pt had episode of nausea and vomiting afterwards. Pt had another repeat 2 episodes in ED as well that resolved spontaneously.  Pt was last admitted in 2020 with status epilepticus requiring intubation and was discharged on keppra and dexamethasone.     VS on arrival otherwise stable. Admission labs WNL except troponin 0.04. EKG non ischemic.   Pt received Keppra and ativan in the ED.        (2021 00:42)      PAST MEDICAL & SURGICAL HISTORY:  History of BPH    Seizure    Lung cancer        Hospital Course: eval for seizures and elevated troponin    TODAY'S SUBJECTIVE & REVIEW OF SYMPTOMS: no CP no COB, no acute pain see HPI        MEDICATIONS  (STANDING):  carvedilol 3.125 milliGRAM(s) Oral every 12 hours  chlorhexidine 4% Liquid 1 Application(s) Topical <User Schedule>  dexAMETHasone     Tablet 2 milliGRAM(s) Oral every 12 hours  enoxaparin Injectable 40 milliGRAM(s) SubCutaneous at bedtime  finasteride 5 milliGRAM(s) Oral daily  lacosamide 100 milliGRAM(s) Oral two times a day  levETIRAcetam  Solution 750 milliGRAM(s) Oral two times a day  LORazepam   Injectable 1 milliGRAM(s) IV Push once  pantoprazole   Suspension 40 milliGRAM(s) Oral daily  tamsulosin 0.4 milliGRAM(s) Oral at bedtime    MEDICATIONS  (PRN):      FAMILY HISTORY:      Allergies    No Known Allergies    Intolerances        SOCIAL HISTORY:  see CM notes    [  ] Etoh  [  ] Smoking  [  ] Substance abuse     Home Environment:  [  ] Home Alone  [x  ] Lives with Family  [  ] Home Health Aid    Dwelling:  [  ] Apartment  [  ] Private House  [  ] Adult Home  [  ] Skilled Nursing Facility      [  ] Short Term  [  ] Long Term  [  ] Stairs       Elevator [  ]    FUNCTIONAL STATUS PTA: (Check all that apply) see SW/ CM notes  Ambulation: [   ]Independent   [   ] Requires Assistance   [  ] Dependent     [  ] Non-Ambulatory       Assistive Device: [  ] SA Cane  [  ]  Q Cane  [  ] Walker  [  ]  Wheelchair  ADL : [  ] Independent    [   ] Requires Assistance    [  ]  Dependent       Vital Signs Last 24 Hrs  T(C): 36.6 (2021 14:21), Max: 37.3 (2021 20:32)  T(F): 97.9 (2021 14:21), Max: 99.2 (2021 20:32)  HR: 91 (2021 14:21) (67 - 91)  BP: 107/55 (2021 14:21) (107/55 - 118/82)  BP(mean): --  RR: 20 (2021 14:21) (18 - 20)  SpO2: --      PHYSICAL EXAM: Alert & Oriented self in bed w self release belt for safety  GENERAL: NAD, well-groomed, well-developed  HEAD:  Atraumatic, Normocephalic,+ alopecia  EYES: EOMI, PERRL  NECK: Supple  CHEST/LUNG: Clear to auscultation  HEART: Regular rate and rhythm  ABDOMEN: Soft, Nontender,   EXTREMITIES:  + 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  ROM:  [ x  ] WFL all extremities  [  ] Abnormal    NERVOUS SYSTEM:   Cranial Nerves 2-12: [ x  ] intact  [  ] Abnormal   Motor Strength: [ x  ] WFL all extremities   [  ] Abnormal     FUNCTIONAL STATUS: see therapy notes  Bed Mobility: [   ]Independent  [  ] Supervision  [x  ] Needs Assistance   [  ] N/A   Transfers: [   ] Independent  [  ] Supervision  [x  ] Needs Assistance  [  ]  N/A   Ambulation: [   ] Independent  [  ] Supervision  [x  ] Needs Assistance  [  ] N/A   ADL: [   ] Independent  [x  ] Requires Assistance  [  ] N/A       LABS:                        11.5   21.44 )-----------( 156      ( 2021 05:48 )             35.8     -    138  |  106  |  24<H>  ----------------------------<  93  4.3   |  22  |  0.6<L>    Ca    7.7<L>      2021 05:48  Mg     2.0     -    TPro  4.1<L>  /  Alb  2.5<L>  /  TBili  0.2  /  DBili  x   /  AST  21  /  ALT  10  /  AlkPhos  64  01-      Urinalysis Basic - ( 2021 08:34 )    Color: Light Yellow / Appearance: Clear / S.026 / pH: x  Gluc: x / Ketone: Trace  / Bili: Negative / Urobili: <2 mg/dL   Blood: x / Protein: Trace / Nitrite: Negative   Leuk Esterase: Negative / RBC: x / WBC x   Sq Epi: x / Non Sq Epi: x / Bacteria: x

## 2021-01-22 NOTE — PROGRESS NOTE ADULT - SUBJECTIVE AND OBJECTIVE BOX
24H events:    Patient is a 80y old Male who presents with a chief complaint of Seizures (14 Jan 2021 09:19)  Cantonese speaker so Cantonese  used    Primary diagnosis of Seizures     Today is hospital day 6d. This morning patient was seen and examined at bedside. No seizure overnight. Patient still confused. But looks more alert.     PAST MEDICAL & SURGICAL HISTORY  History of BPH    Seizure    Lung cancer      SOCIAL HISTORY:  Social History:      ALLERGIES:  No Known Allergies    MEDICATIONS:  STANDING MEDICATIONS  carvedilol 3.125 milliGRAM(s) Oral every 12 hours  chlorhexidine 4% Liquid 1 Application(s) Topical <User Schedule>  dexAMETHasone     Tablet 2 milliGRAM(s) Oral every 12 hours  enoxaparin Injectable 40 milliGRAM(s) SubCutaneous at bedtime  finasteride 5 milliGRAM(s) Oral daily  lacosamide 100 milliGRAM(s) Oral two times a day  levETIRAcetam  Solution 750 milliGRAM(s) Oral two times a day  LORazepam   Injectable 1 milliGRAM(s) IV Push once  pantoprazole   Suspension 40 milliGRAM(s) Oral daily  tamsulosin 0.4 milliGRAM(s) Oral at bedtime    PRN MEDICATIONS    VITALS:   T(F): 96.6  HR: 72  BP: 95/63  RR: 19  SpO2: 97%    PHYSICAL EXAM:  GENERAL :looks comfortable, was having breakfast  HEAD:  Atraumatic, Normocephalic  EYES: EOMI  NECK: Supple  NERVOUS SYSTEM: Patient looks more alert today. He is alert to self and others, place, his age, but not to time. Non focal, follows commands.  CHEST/LUNG: Clear to auscultation bilaterally; No rales, rhonchi, wheezing, or rubs  HEART: Regular rate and rhythm; No murmurs, rubs, or gallops  ABDOMEN: Soft, Nontender, Nondistended; Bowel sounds present  EXTREMITIES:  2+ Peripheral Pulses, No clubbing, cyanosis, or edema  LYMPH: No lymphadenopathy noted  SKIN: No rashes or lesions  LABS:                        11.1   22.42 )-----------( 172      ( 22 Jan 2021 06:02 )             34.4     01-22    143  |  109  |  28<H>  ----------------------------<  136<H>  4.5   |  27  |  0.6<L>    Ca    7.8<L>      22 Jan 2021 06:02  Mg     1.9     01-22    TPro  4.2<L>  /  Alb  2.8<L>  /  TBili  0.4  /  DBili  x   /  AST  17  /  ALT  11  /  AlkPhos  60  01-22              Culture - Urine (collected 20 Jan 2021 08:34)  Source: .Urine Clean Catch (Midstream)  Preliminary Report (21 Jan 2021 17:00):    >100,000 CFU/ml Gram positive organisms          RADIOLOGY:     Xray Chest 1 View-PORTABLE IMMEDIATE (01.13.21 @ 21:20) >  Left lung mass. Mediastinal lymphadenopathy. Supportdevices as described.  Without change.         MR Head w/wo IV Cont (01.18.21 @ 14:50) >  In comparison with the prior MRI of the brain dated November 1, 2020:  The current examination is limited by motion artifact.  Abnormal signal in the left frontal lobe and extra-axial fluid collection beneath the craniotomy site overall appear smaller likely representing resolving postoperative changes.  New small (0.4 cm) enhancing lesion in the left temporal lobe, likely representing metastatic disease in view of the patient's stated clinical history of metastatic.  Mild progression of corpus callosal atrophy.             24H events:    Patient is a 80y old Male who presents with a chief complaint of Seizures (14 Jan 2021 09:19)  KelDoc speaker so KelDoc  used ID [id number]    Primary diagnosis of Seizures     Today is hospital day 6d. This morning patient was seen and examined at bedside. No seizure overnight. Patient still confused. But looks more alert.     PAST MEDICAL & SURGICAL HISTORY  History of BPH    Seizure    Lung cancer      SOCIAL HISTORY:  Social History:      ALLERGIES:  No Known Allergies    MEDICATIONS:  STANDING MEDICATIONS  carvedilol 3.125 milliGRAM(s) Oral every 12 hours  chlorhexidine 4% Liquid 1 Application(s) Topical <User Schedule>  dexAMETHasone     Tablet 2 milliGRAM(s) Oral every 12 hours  enoxaparin Injectable 40 milliGRAM(s) SubCutaneous at bedtime  finasteride 5 milliGRAM(s) Oral daily  lacosamide 100 milliGRAM(s) Oral two times a day  levETIRAcetam  Solution 750 milliGRAM(s) Oral two times a day  LORazepam   Injectable 1 milliGRAM(s) IV Push once  pantoprazole   Suspension 40 milliGRAM(s) Oral daily  tamsulosin 0.4 milliGRAM(s) Oral at bedtime    PRN MEDICATIONS    VITALS:   T(F): 96.6  HR: 72  BP: 95/63  RR: 19  SpO2: 97%    PHYSICAL EXAM:  GENERAL :looks comfortable, was having breakfast  HEAD:  Atraumatic, Normocephalic  EYES: EOMI  NECK: Supple  NERVOUS SYSTEM: Patient looks more alert today. He is alert to self and others, place, his age, but not to time. Non focal, follows commands.  CHEST/LUNG: Clear to auscultation bilaterally; No rales, rhonchi, wheezing, or rubs  HEART: Regular rate and rhythm; No murmurs, rubs, or gallops  ABDOMEN: Soft, Nontender, Nondistended; Bowel sounds present  EXTREMITIES:  2+ Peripheral Pulses, No clubbing, cyanosis, or edema  LYMPH: No lymphadenopathy noted  SKIN: No rashes or lesions  LABS:                        11.1   22.42 )-----------( 172      ( 22 Jan 2021 06:02 )             34.4     01-22    143  |  109  |  28<H>  ----------------------------<  136<H>  4.5   |  27  |  0.6<L>    Ca    7.8<L>      22 Jan 2021 06:02  Mg     1.9     01-22    TPro  4.2<L>  /  Alb  2.8<L>  /  TBili  0.4  /  DBili  x   /  AST  17  /  ALT  11  /  AlkPhos  60  01-22              Culture - Urine (collected 20 Jan 2021 08:34)  Source: .Urine Clean Catch (Midstream)  Preliminary Report (21 Jan 2021 17:00):    >100,000 CFU/ml Gram positive organisms          RADIOLOGY:     Xray Chest 1 View-PORTABLE IMMEDIATE (01.13.21 @ 21:20) >  Left lung mass. Mediastinal lymphadenopathy. Supportdevices as described.  Without change.         MR Head w/wo IV Cont (01.18.21 @ 14:50) >  In comparison with the prior MRI of the brain dated November 1, 2020:  The current examination is limited by motion artifact.  Abnormal signal in the left frontal lobe and extra-axial fluid collection beneath the craniotomy site overall appear smaller likely representing resolving postoperative changes.  New small (0.4 cm) enhancing lesion in the left temporal lobe, likely representing metastatic disease in view of the patient's stated clinical history of metastatic.  Mild progression of corpus callosal atrophy.

## 2021-01-22 NOTE — OCCUPATIONAL THERAPY INITIAL EVALUATION ADULT - ORIENTATION, REHAB EVAL
Pt was oriented to place and name of [facility] ( [place]) and stated year as 2021 then agreed it was 2021. For the remainder of the orientation questions Pt. perseverated on repeating 2021.

## 2021-01-22 NOTE — OCCUPATIONAL THERAPY INITIAL EVALUATION ADULT - PERTINENT HX OF CURRENT PROBLEM, REHAB EVAL
Pt is a 81 yo male, Cantonese speaking with PMH of Lung ca (dx 05/2018) with mets to brain s/p resection (09/2019)  on chemotherapy- last chemo on Jan 5, HTN, BPH comes to the ED after seizure episode.

## 2021-01-22 NOTE — CHART NOTE - NSCHARTNOTEFT_GEN_A_CORE
Patient found to have DVT in peroneal vein on prelim LE duplex read. Spoke to neurology and got clearance to start full AC. Will increase lovenox to therapeutic BID.

## 2021-01-22 NOTE — CONSULT NOTE ADULT - SUBJECTIVE AND OBJECTIVE BOX
EMANUEL CAMPBELL  80y, Male  Allergy: No Known Allergies      CHIEF COMPLAINT: Seizures (22 Jan 2021 09:36)      HPI:  Pt is a 81 yo male, Cantonese speaking with PMH of Lung ca (dx 05/2018) with mets to brain s/p resection (09/2019)  on chemotherapy- last chemo on Jan 5, HTN, BPH comes to the ED after seizure episode.   HPI obtained from wife at bedside. As per wife, pt had diffuse tonic-clonic episode that lasted couple min and resolved spontaneously. Pt had episode of nausea and vomiting afterwards. Pt had another repeat 2 episodes in ED as well that resolved spontaneously.  Pt was last admitted in Nov 2020 with status epilepticus requiring intubation and was discharged on keppra and dexamethasone.     VS on arrival otherwise stable. Admission labs WNL except troponemia 0.04. EKG non ischemic.   Pt received Keppra and ativan in the ED.     Infectious Diseases History:  Old Micro Data/Cultures:     FAMILY HISTORY:    PAST MEDICAL & SURGICAL HISTORY:  History of BPH    Seizure    Lung cancer        SOCIAL HISTORY  Social History:  61  years  of smoking quit 2015  Drink ETOH occasionally   Denies Ilicit drug use (28 Oct 2020 12:45)      Recent Travel:  Other Exposures:     ROS  General: Denies rigors, nightsweats  HEENT: Denies headache, rhinorrhea, sore throat, eye pain  CV: Denies CP, palpitations  PULM: Denies wheezing, hemoptysis  GI: Denies hematemesis, hematochezia, melena  : Denies discharge, hematuria  MSK: Denies arthralgias, myalgias  SKIN: Denies rash, lesions  NEURO: Denies paresthesias, weakness  PSYCH: Denies depression, anxiety    VITALS:  T(F): 96.6, Max: 98 (01-21-21 @ 21:18)  HR: 72  BP: 95/63  RR: 19Vital Signs Last 24 Hrs  T(C): 35.9 (22 Jan 2021 04:56), Max: 36.7 (21 Jan 2021 21:18)  T(F): 96.6 (22 Jan 2021 04:56), Max: 98 (21 Jan 2021 21:18)  HR: 72 (22 Jan 2021 04:56) (67 - 91)  BP: 95/63 (22 Jan 2021 04:56) (95/63 - 120/71)  BP(mean): --  RR: 19 (22 Jan 2021 04:56) (19 - 20)  SpO2: 97% (21 Jan 2021 17:31) (97% - 97%)    PHYSICAL EXAM:  Gen: NAD, resting in bed  HEENT: Normocephalic, atraumatic  Neck: supple, no lymphadenopathy  CV: Regular rate & regular rhythm  Lungs: CTAB  Abdomen: Soft, BS present  Ext: Warm, well perfused  Neuro: non focal, awake  Skin: no rash, no lesions  Lines: no phlebitis    TESTS & MEASUREMENTS:                        11.1   22.42 )-----------( 172      ( 22 Jan 2021 06:02 )             34.4     01-22    143  |  109  |  28<H>  ----------------------------<  136<H>  4.5   |  27  |  0.6<L>    Ca    7.8<L>      22 Jan 2021 06:02  Mg     1.9     01-22    TPro  4.2<L>  /  Alb  2.8<L>  /  TBili  0.4  /  DBili  x   /  AST  17  /  ALT  11  /  AlkPhos  60  01-22    eGFR if Non African American: 96 mL/min/1.73M2 (01-22-21 @ 06:02)  eGFR if African American: 111 mL/min/1.73M2 (01-22-21 @ 06:02)    LIVER FUNCTIONS - ( 22 Jan 2021 06:02 )  Alb: 2.8 g/dL / Pro: 4.2 g/dL / ALK PHOS: 60 U/L / ALT: 11 U/L / AST: 17 U/L / GGT: x               Culture - Urine (collected 01-20-21 @ 08:34)  Source: .Urine Clean Catch (Midstream)  Preliminary Report (01-21-21 @ 17:00):    >100,000 CFU/ml Gram positive organisms    Culture - Blood (collected 01-15-21 @ 21:20)  Source: .Blood Blood-Venous  Final Report (01-21-21 @ 03:00):    No Growth Final    Culture - Blood (collected 01-15-21 @ 21:20)  Source: .Blood Blood-Venous  Final Report (01-21-21 @ 03:00):    No Growth Final            INFECTIOUS DISEASES TESTING  MRSA PCR Result.: Negative (10-29-20 @ 04:25)      RADIOLOGY & ADDITIONAL TESTS:  I have personally reviewed the last available Chest xray  CXR      CT      CARDIOLOGY TESTING  12 Lead ECG:   Ventricular Rate 110 BPM    Atrial Rate 110 BPM    P-R Interval 164 ms    QRS Duration 84 ms    Q-T Interval 352 ms    QTC Calculation(Bazett) 476 ms    P Axis 27 degrees    R Axis 108 degrees    T Axis -8 degrees    Diagnosis Line Sinus tachycardia with Premature supraventricular complexes  Possible Right ventricular hypertrophy  Nonspecific ST-T changes  Abnormal ECG    Confirmed by Luke Malcolm ([id number]) on 1/13/2021 11:35:13 PM (01-13-21 @ 21:43)      All available historical records have been reviewed    MEDICATIONS  carvedilol 3.125  chlorhexidine 4% Liquid 1  dexAMETHasone     Tablet 2  enoxaparin Injectable 40  finasteride 5  lacosamide 100  levETIRAcetam  Solution 750  LORazepam   Injectable 1  pantoprazole   Suspension 40  tamsulosin 0.4      ANTIBIOTICS:      All available historical data has been reviewed    ASSESSMENT  Pt is a 81 yo male, Cantonese speaking with PMH of Lung ca (dx 05/2018) with mets to brain s/p resection (09/2019)  on chemotherapy- last chemo on Jan 5, HTN, BPH comes to the ED after seizure episode.   HPI obtained from wife at bedside. As per wife, pt had diffuse tonic-clonic episode that lasted couple min and resolved spontaneously. Pt had episode of nausea and vomiting afterwards. Pt had another repeat 2 episodes in ED as well that resolved spontaneously.  Pt was last admitted in Nov 2020 with status epilepticus requiring intubation and was discharged on keppra and dexamethasone.     VS on arrival otherwise stable. Admission labs WNL except troponemia 0.04. EKG non ischemic.   Pt received Keppra and ativan in the ED.     IMPRESSION  # High probability of aspiration chemical pneumonitis secondary to seizure  - Leukocytosis  - negative urine  - No high suspicion for pneumonia, afebrile, no cough  - CXR consistent with lung mass from prior    RECOMMENDATIONS  - No need for antibiotics      This is a pended note. All final recommendations to follow pending discussion with ID Attending    EMANUEL CAMPBELL  80y, Male  Allergy: No Known Allergies      CHIEF COMPLAINT: Seizures (22 Jan 2021 09:36)      HPI:  Pt is a 79 yo male, Cantonese speaking with PMH of Lung ca (dx 05/2018) with mets to brain s/p resection (09/2019)  on chemotherapy- last chemo on Jan 5, HTN, BPH comes to the ED after seizure episode.   HPI obtained from wife at bedside. As per wife, pt had diffuse tonic-clonic episode that lasted couple min and resolved spontaneously. Pt had episode of nausea and vomiting afterwards. Pt had another repeat 2 episodes in ED as well that resolved spontaneously.  Pt was last admitted in Nov 2020 with status epilepticus requiring intubation and was discharged on keppra and dexamethasone.     VS on arrival otherwise stable. Admission labs WNL except troponemia 0.04. EKG non ischemic.   Pt received Keppra and ativan in the ED.     Infectious Diseases History:  Old Micro Data/Cultures:     FAMILY HISTORY:    PAST MEDICAL & SURGICAL HISTORY:  History of BPH    Seizure    Lung cancer        SOCIAL HISTORY  Social History:  60  years  of smoking quit 2015  Drink ETOH occasionally   Denies Ilicit drug use (28 Oct 2020 12:45)      Recent Travel:  Other Exposures:     ROS  General: Denies rigors, nightsweats  HEENT: Denies headache, rhinorrhea, sore throat, eye pain  CV: Denies CP, palpitations  PULM: Denies wheezing, hemoptysis  GI: Denies hematemesis, hematochezia, melena  : Denies discharge, hematuria  MSK: Denies arthralgias, myalgias  SKIN: Denies rash, lesions  NEURO: Denies paresthesias, weakness  PSYCH: Denies depression, anxiety    VITALS:  T(F): 96.6, Max: 98 (01-21-21 @ 21:18)  HR: 72  BP: 95/63  RR: 19Vital Signs Last 24 Hrs  T(C): 35.9 (22 Jan 2021 04:56), Max: 36.7 (21 Jan 2021 21:18)  T(F): 96.6 (22 Jan 2021 04:56), Max: 98 (21 Jan 2021 21:18)  HR: 72 (22 Jan 2021 04:56) (67 - 91)  BP: 95/63 (22 Jan 2021 04:56) (95/63 - 120/71)  BP(mean): --  RR: 19 (22 Jan 2021 04:56) (19 - 20)  SpO2: 97% (21 Jan 2021 17:31) (97% - 97%)    PHYSICAL EXAM:  Gen: NAD, resting in bed  HEENT: Normocephalic, atraumatic  Neck: supple, no lymphadenopathy  CV: Regular rate & regular rhythm  Lungs: CTAB  Abdomen: Soft, BS present  Ext: Warm, well perfused  Neuro: non focal, awake  Skin: no rash, no lesions  Lines: no phlebitis    TESTS & MEASUREMENTS:                        11.1   22.42 )-----------( 172      ( 22 Jan 2021 06:02 )             34.4     01-22    143  |  109  |  28<H>  ----------------------------<  136<H>  4.5   |  27  |  0.6<L>    Ca    7.8<L>      22 Jan 2021 06:02  Mg     1.9     01-22    TPro  4.2<L>  /  Alb  2.8<L>  /  TBili  0.4  /  DBili  x   /  AST  17  /  ALT  11  /  AlkPhos  60  01-22    eGFR if Non African American: 96 mL/min/1.73M2 (01-22-21 @ 06:02)  eGFR if African American: 111 mL/min/1.73M2 (01-22-21 @ 06:02)    LIVER FUNCTIONS - ( 22 Jan 2021 06:02 )  Alb: 2.8 g/dL / Pro: 4.2 g/dL / ALK PHOS: 60 U/L / ALT: 11 U/L / AST: 17 U/L / GGT: x               Culture - Urine (collected 01-20-21 @ 08:34)  Source: .Urine Clean Catch (Midstream)  Preliminary Report (01-21-21 @ 17:00):    >100,000 CFU/ml Gram positive organisms    Culture - Blood (collected 01-15-21 @ 21:20)  Source: .Blood Blood-Venous  Final Report (01-21-21 @ 03:00):    No Growth Final    Culture - Blood (collected 01-15-21 @ 21:20)  Source: .Blood Blood-Venous  Final Report (01-21-21 @ 03:00):    No Growth Final            INFECTIOUS DISEASES TESTING  MRSA PCR Result.: Negative (10-29-20 @ 04:25)      RADIOLOGY & ADDITIONAL TESTS:  I have personally reviewed the last available Chest xray  CXR      CT      CARDIOLOGY TESTING  12 Lead ECG:   Ventricular Rate 110 BPM    Atrial Rate 110 BPM    P-R Interval 164 ms    QRS Duration 84 ms    Q-T Interval 352 ms    QTC Calculation(Bazett) 476 ms    P Axis 27 degrees    R Axis 108 degrees    T Axis -8 degrees    Diagnosis Line Sinus tachycardia with Premature supraventricular complexes  Possible Right ventricular hypertrophy  Nonspecific ST-T changes  Abnormal ECG    Confirmed by Luke Malcolm ([id number]) on 1/13/2021 11:35:13 PM (01-13-21 @ 21:43)      All available historical records have been reviewed    MEDICATIONS  carvedilol 3.125  chlorhexidine 4% Liquid 1  dexAMETHasone     Tablet 2  enoxaparin Injectable 40  finasteride 5  lacosamide 100  levETIRAcetam  Solution 750  LORazepam   Injectable 1  pantoprazole   Suspension 40  tamsulosin 0.4      ANTIBIOTICS:      All available historical data has been reviewed    ASSESSMENT  Pt is a 79 yo male, Cantonese speaking with PMH of Lung ca (dx 05/2018) with mets to brain s/p resection (09/2019)  on chemotherapy- last chemo on Jan 5, HTN, BPH comes to the ED after seizure episode.   HPI obtained from wife at bedside. As per wife, pt had diffuse tonic-clonic episode that lasted couple min and resolved spontaneously. Pt had episode of nausea and vomiting afterwards. Pt had another repeat 2 episodes in ED as well that resolved spontaneously.  Pt was last admitted in Nov 2020 with status epilepticus requiring intubation and was discharged on keppra and dexamethasone.     VS on arrival otherwise stable. Admission labs WNL except troponemia 0.04. EKG non ischemic.   Pt received Keppra and ativan in the ED.     IMPRESSION  # High probability of aspiration chemical pneumonitis secondary to seizure  - Leukocytosis  - negative urine  - No high suspicion for pneumonia, afebrile, no cough  - CXR consistent with lung mass from prior    RECOMMENDATIONS  - No need for antibiotics  recall prn please

## 2021-01-22 NOTE — PROGRESS NOTE ADULT - ATTENDING COMMENTS
Patient seen and examined earlier today w/ Cantonese . Case discussed with housestaff, nursing, case mgmt, neurology Dr. Jiménez. I agree with most of the resident's note, physical exam, and plan except as below. Patient without complaints. Denies CP, SOB, AP. Remainder ROS unremarkable.    Gen: NAD, AA0x2, follows commands  HEENT: EOMI, no LAD  CV: nl S1 S2  Resp: decreased BS b/l  GI: NT/ND/S +BS  MS: no c/c/e, +pulses  Neuro: grossly moving all extremities, +reflexes    < from: MR Head w/wo IV Cont (01.18.21 @ 14:50) >  Abnormal signal in the left frontal lobe and extra-axial fluid collection beneath the craniotomy site overall appear smaller likely representing resolving postoperative changes.  New small (0.4 cm) enhancing lesion in the left temporal lobe, likely representing metastatic disease in view of the patient's stated clinical history of metastatic.    #Toxic Metabolic Encephalopathy NOS: Unclear whether due to seizures vs new brain lesion vs AED side effect or a combination. Cont AEDs as per neuro. Tapered off Depakote and started on Vimpat. Seizure precautions. Mental status improved.  #Seizures w/ Status epilepticus POA: AEDs as per neuro.   #Leukocytosis. ?Etiology: steroids being tapered, all Cx, CXR unremarkable. Check procal level, repeat CBC in am, ID eval, LE doppler  #Lung cancer w/ more brain mets: cont steroids w/ gradual taper. D/w pt's oncologist Dr. Wasserman in [place], he plans to send pt to Dr. Paul for more gamma knife treatment. Pt was scheduled for chemo on 1/19  #HTN/BPH/Urinary retension: cont home meds, voiding trial    Dispo: home w/ services once WBC improve. F/u ID, LE doppler, procal. Daughter wants pt home.     High risk pt.  Full code     My note supersedes the residents note should a discrepancy arise.      35 minutes spent on total encounter; more than 50% of the visit was spent counseling and/or coordinating care by the attending physician.

## 2021-01-22 NOTE — OCCUPATIONAL THERAPY INITIAL EVALUATION ADULT - ADDITIONAL COMMENTS
Pt is questionable historian but stated being Ind. with [facility];s PTA and not using DME. Pt lives in  with family and uses walk in shower.

## 2021-01-22 NOTE — PROGRESS NOTE ADULT - SUBJECTIVE AND OBJECTIVE BOX
Neurology Progress Note    Interval History:    Pt is seen in f/u for brain tumor and seizure disorder.  Due to somnolence his keppra dose was redcued to 750 mg bid and lacosamide 100 mg bid was added to anticonvulsant regimen earlier in the week.  Patient was examined this morning using [id number] [place].      Vital Signs Last 24 Hrs  T(C): 37.1 (22 Jan 2021 14:08), Max: 37.1 (22 Jan 2021 14:08)  T(F): 98.7 (22 Jan 2021 14:08), Max: 98.7 (22 Jan 2021 14:08)  HR: 101 (22 Jan 2021 14:08) (67 - 101)  BP: 113/71 (22 Jan 2021 14:08) (95/63 - 120/71)  BP(mean): --  RR: 19 (22 Jan 2021 04:56) (19 - 20)  SpO2: 97% (21 Jan 2021 17:31) (97% - 97%)    Neurological Exam:   Patient is alert and oriented to being in hospital (when given multiple choice), year (said 2021 without any help) and asked [place]  He did perseverate on year when asked the month repeated 2021.  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  Facial mm are symmetric.  Upper and lower extremities show symmetric strength.    MEDICATIONS  (STANDING):  carvedilol 3.125 milliGRAM(s) Oral every 12 hours  chlorhexidine 4% Liquid 1 Application(s) Topical <User Schedule>  dexAMETHasone     Tablet 1 milliGRAM(s) Oral two times a day  enoxaparin Injectable 40 milliGRAM(s) SubCutaneous at bedtime  finasteride 5 milliGRAM(s) Oral daily  lacosamide 100 milliGRAM(s) Oral two times a day  levETIRAcetam  Solution 750 milliGRAM(s) Oral two times a day  LORazepam   Injectable 1 milliGRAM(s) IV Push once  pantoprazole   Suspension 40 milliGRAM(s) Oral daily  tamsulosin 0.4 milliGRAM(s) Oral at bedtime      Labs:  CBC Full  -  ( 22 Jan 2021 06:02 )  WBC Count : 22.42 K/uL  RBC Count : 3.63 M/uL  Hemoglobin : 11.1 g/dL  Hematocrit : 34.4 %  Platelet Count - Automated : 172 K/uL  Mean Cell Volume : 94.8 fL  Mean Cell Hemoglobin : 30.6 pg  Mean Cell Hemoglobin Concentration : 32.3 g/dL  Auto Neutrophil # : 16.60 K/uL  Auto Lymphocyte # : 1.69 K/uL  Auto Monocyte # : 1.00 K/uL  Auto Eosinophil # : 0.01 K/uL  Auto Basophil # : 0.02 K/uL  Auto Neutrophil % : 74.1 %  Auto Lymphocyte % : 7.5 %  Auto Monocyte % : 4.5 %  Auto Eosinophil % : 0.0 %  Auto Basophil % : 0.1 %    01-22    143  |  109  |  28<H>  ----------------------------<  136<H>  4.5   |  27  |  0.6<L>    Ca    7.8<L>      22 Jan 2021 06:02  Mg     1.9     01-22    TPro  4.2<L>  /  Alb  2.8<L>  /  TBili  0.4  /  DBili  x   /  AST  17  /  ALT  11  /  AlkPhos  60  01-22    LIVER FUNCTIONS - ( 22 Jan 2021 06:02 )  Alb: 2.8 g/dL / Pro: 4.2 g/dL / ALK PHOS: 60 U/L / ALT: 11 U/L / AST: 17 U/L / GGT: x           < from: MR Head w/wo IV Cont (01.18.21 @ 14:50) >  In comparison with the prior MRI of the brain dated November 1, 2020:    The current examination is limited by motion artifact.    Abnormal signal in the left frontal lobe and extra-axial fluid collection beneath the craniotomy site overall appear smaller likely representing resolving postoperative changes.    New small (0.4 cm) enhancing lesion in the left temporal lobe, likely representing metastatic disease in view of the patient's stated clinical history of metastatic.    Mild progression of corpus callosal atrophy.    < end of copied text >      Assessment:  This is a 80y Male w/ h/o brain mets and seizures, now with improving mental status despite MRI showing new enhancing lesion in left temporal lobe.    Plan:  1.  Continue current anticonvulsant regimen levetiracetam 750 mg bid and lacosamide 100 mg bid.  2.  PT, OT, Speech tx.  3.  Defer to heme/onc management of underlying cancer and brain mets.

## 2021-01-22 NOTE — OCCUPATIONAL THERAPY INITIAL EVALUATION ADULT - GENERAL OBSERVATIONS, REHAB EVAL
Pt was seen from 8:10-9:00am. Pt cooperative to OT TX + IV locked and encountered in supine. OT used  services # [id number] ( [place])

## 2021-01-22 NOTE — CONSULT NOTE ADULT - ATTENDING COMMENTS
I have personally examined the patient and reviewed the documentation above.  Corrections and edits were made wherever needed.
Patient examined this morning and is nonverbal even with Cantonese .  Pupils equal and reactive.  Blink to threat diminished both sides.  Moves extremities symmetrically.  Agree with vEEG to r/o complex partial status.  Talking with medical team they point out last discharge note indicates keppra was supposed to be two 750 mg tabs (1500 mg) twice daily.  However wife could not confirm this and pharmacy wasn't open.  This is being verified.  If in fact he was consistently receiving 1500 mg twice daily then would continue this dose and add valproic acid 15 mg/kg x 1 loading followed by 500 mg twice daily.    If he was only being given 750 mg twice daily at home of keppra then would increase to 1500 mg bid.  Keep magnesium > 2.0.  Call neurology for breakthrough seizures.    I discussed this plan with medical team this morning.

## 2021-01-22 NOTE — PROGRESS NOTE ADULT - ASSESSMENT
79 yo male, Cantonese speaking with PMH of Lung ca (dx 05/2018) with mets to brain s/p resection (09/2019)  on chemotherapy- last chemo on Jan 5, HTN, BPH comes to the ED after seizure episode.     #) Seizures with h/o Brain mets s/p Resection   - Likely secondary to recurrent brain mets vs less likely secondary to seizure  - Patient remains AOX2 improving slightly/ more alert secondary to seizure vs mets vs medication induced  - Had 3 episodes of witnessed seizures on admission  - VEEG showed focal cerebral dysfunction in left frontotemporal region  - CT head with stable post surgical changes, might need MRI if concern for new mets  - MRI brain showed 0.6 cm new lesion at the left frontal lobe  - Was on keppra and valproic acid  - Valproic acid stopped as per neurology recommendation. Decrease keppra to 750mg bid, add Vimpat 50mg for 2 dosed (received) then 100 mg  q12.  - Decrease dexamethasone to 2 mg Iv q 12h as it may worsens confusion and causes psychosis  - Follows with Dr Wasserman in [facility]. Informed about his condition, Was due to chemotherapy today. Plan for follow up with Dr lyles for gamma knife treatment    #Leukocytosis  -WBC 21K-->22K  -No clear focus for infection  -Has a portacath  -pending procalcitonin  -Blood cx negative on 1/23, will repeat Blood cx/ chest xray  -U/A negative, urine culture >[id number] gram positive organism s/p you catheter  -ID consult    #hx of BPH  -s/p you catheter , passed TOV  -on tamsulosin and finasteride    #) H/o Lung Ca  - Left side Mass on CXR  - On chemotherapy  - Not on O2  - Will follow up with his oncologist for recurrent brain mets. Plan for gamma knife radiation as out patient    #) h/o HTN  - c/w coreg    #) diet-  DASH  #) DVT ppx- Lovenox  #) GI ppx- protonix  #)Discharge planing pending complete infectious workup 81 yo male, Cantonese speaking with PMH of Lung ca (dx 05/2018) with mets to brain s/p resection (09/2019)  on chemotherapy- last chemo on Jan 5, HTN, BPH comes to the ED after seizure episode.     #) Seizures with h/o Brain mets s/p Resection   - Likely secondary to recurrent brain mets vs less likely secondary to seizure  - Patient remains AOX2 improving slightly/ more alert secondary to seizure vs mets vs medication induced  - Had 3 episodes of witnessed seizures on admission  - VEEG showed focal cerebral dysfunction in left frontotemporal region  - CT head with stable post surgical changes, might need MRI if concern for new mets  - MRI brain showed 0.6 cm new lesion at the left frontal lobe  - Was on keppra and valproic acid  - Valproic acid stopped as per neurology recommendation. Decrease keppra to 750mg bid, add Vimpat 50mg for 2 dosed (received) then 100 mg  q12.  - Decrease dexamethasone to 2 mg Iv q 12h as it may worsens confusion and causes psychosis  - Follows with Dr Wasserman in [facility]. Informed about his condition, Was due to chemotherapy today. Plan for follow up with Dr lyles for gamma knife treatment    #Leukocytosis  -WBC 21K-->22K  -No clear focus for infection  -Has a portacath  -pending procalcitonin  -Blood cx negative on 1/23, will repeat Blood cx/ chest xray  -U/A negative, urine culture >[id number] gram positive organism s/p you catheter  -ID consult    #hx of BPH  -s/p you catheter , passed TOV  -on tamsulosin and finasteride    #) H/o Lung Ca  - Left side Mass on CXR  - On chemotherapy  - Not on O2  - Will follow up with his oncologist for recurrent brain mets. Plan for gamma knife radiation as out patient    #) h/o HTN  - c/w coreg    #) diet-  DASH  #) DVT ppx- Lovenox  #) GI ppx- protonix  #)Discharge planing pending complete infectious workup  spoke to daughter sonantonio updated about plan 81 yo male, Cantonese speaking with PMH of Lung ca (dx 05/2018) with mets to brain s/p resection (09/2019)  on chemotherapy- last chemo on Jan 5, HTN, BPH comes to the ED after seizure episode.     #) Seizures with h/o Brain mets s/p Resection   - Likely secondary to recurrent brain mets vs less likely secondary to seizure  - Patient remains AOX2 improving slightly/ more alert secondary to seizure vs mets vs medication induced  - Had 3 episodes of witnessed seizures on admission  - VEEG showed focal cerebral dysfunction in left frontotemporal region  - CT head with stable post surgical changes, might need MRI if concern for new mets  - MRI brain showed 0.6 cm new lesion at the left frontal lobe  - Was on keppra and valproic acid  - Valproic acid stopped as per neurology recommendation. Decrease Keppra to 750mg bid, add Vimpat 50mg for 2 dosed (received) then 100 mg  q12.  - Decrease dexamethasone to 2 mg Iv q 12h as it may worsens confusion and causes psychosis  - Follows with Dr Wasserman in [facility]. Informed about his condition, Was due to chemotherapy today. Plan for follow up with Dr lyles for gamma knife treatment    #Leukocytosis  -WBC 21K-->22K  -No clear focus for infection  -Has a portacath  -pending procalcitonin  -Blood cx negative on 1/23, will repeat Blood cx/ chest xray  -U/A negative, urine culture >[id number] gram positive organism s/p you catheter  -ID consult    #hx of BPH  -s/p you catheter , passed TOV  -on tamsulosin and finasteride    #) H/o Lung Ca  - Left side Mass on CXR  - On chemotherapy  - Not on O2  - Will follow up with his oncologist for recurrent brain mets. Plan for gamma knife radiation as out patient    #) h/o HTN  - c/w coreg    #) diet-  DASH  #) DVT ppx- Lovenox  #) GI ppx- protonix  #)Discharge planing pending complete infectious workup  spoke to daughter sonantonio updated about plan  [facility]: [facility] [id number]

## 2021-01-23 NOTE — PROGRESS NOTE ADULT - ASSESSMENT
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 Found to have new L temporal lobe lesion.    #Seizure disorder, in setting of brain metastases  cth, mri noted, new L temporal lobe lesion  keppra 750 bid  vimpat 100 bid  appreciate neuro  stable for d/c, needs outpt pmd, onc, neuro f/u one week  #DVT  noted on bl le duplex  lovenox change to eliquis  #Lung ca, metastatic to brain  decadron 1 bid  #HTN  coreg 3.125 bid  #BPH  finasteride 5  tamsuolsin 0.4  #DVT ppx  eliquis

## 2021-01-23 NOTE — DISCHARGE NOTE PROVIDER - PROVIDER TOKENS
PROVIDER:[TOKEN:[[id number]],FOLLOWUP:[2 weeks]],PROVIDER:[TOKEN:[[id number]],FOLLOWUP:[1 week]]

## 2021-01-23 NOTE — DISCHARGE NOTE PROVIDER - NSDCHHATTENDCERT_GEN_ALL_CORE
HTN (hypertension)
My signature below certifies that the above stated patient is homebound and upon completion of the Face-To-Face encounter, has the need for intermittent skilled nursing, physical therapy and/or speech or occupational therapy services in their home for their current diagnosis as outlined in their initial plan of care. These services will continue to be monitored by myself or another physician.

## 2021-01-23 NOTE — DISCHARGE NOTE PROVIDER - NSDCCPCAREPLAN_GEN_ALL_CORE_FT
PRINCIPAL DISCHARGE DIAGNOSIS  Diagnosis: Seizures  Assessment and Plan of Treatment: You were admitted for seizure, likely secondary to new metastatic brain lesion. Seen by neurology, medications were re-adjusted.  PLAN:  -Keppra 750 mg orally twice daily  -lacosamide 100 mg orally twice daily  -follow up with neurology  -follow up with your hematologist at [facility]  -Avoid taking bath/driving by your self        SECONDARY DISCHARGE DIAGNOSES  Diagnosis: DVT, lower extremity  Assessment and Plan of Treatment: You were found to have clot in your left lower leg. Could be secondary for malignancy.   Plan:  -start on blood thinner called Eliquis 5 mg 2 tabs twice daily for 7 days, then 1 tab twice daily   -Follow up with your oncologist

## 2021-01-23 NOTE — DISCHARGE NOTE PROVIDER - HOSPITAL COURSE
81 yo male, Cantonese speaking with PMH of Lung ca (dx 05/2018) with mets to brain s/p resection (09/2019)  on chemotherapy- last chemo on Jan 5, HTN, BPH comes to the ED after seizure episode.     #) Seizures with h/o Brain mets s/p Resection   - Likely secondary to recurrent brain mets vs less likely secondary to seizure  - Patient remains AOX2 improving slightly/ more alert secondary to seizure vs mets vs medication induced  - Had 3 episodes of witnessed seizures on admission  - VEEG showed focal cerebral dysfunction in left frontotemporal region  - CT head with stable post surgical changes, might need MRI if concern for new mets  - MRI brain showed 0.6 cm new lesion at the left frontal lobe  - Was on keppra and valproic acid  - Valproic acid stopped as per neurology recommendation. Decrease Keppra to 750mg bid, add Vimpat 50mg for 2 dosed (received) then 100 mg  q12.  - Decrease dexamethasone to 2 mg Iv q 12h as it may worsens confusion and causes psychosis  - Follows with Dr Wasserman in [facility]. Informed about his condition, Was due to chemotherapy today. Plan for follow up with Dr lyles for gamma knife treatment    #Leukocytosis  -WBC 21K-->22K  -No clear focus for infection  -Has a portacath  -procal negative  -Blood cx negative on 1/23, C-xray negative, Blood cx resent  -U/A negative, urine culture enterococcus fecalis Multi sensitive-->DC on Augmentin for 7 days( concerns for CAUTI)    #hx of BPH  -s/p you catheter , passed TOV  -on tamsulosin and finasteride    #) H/o Lung Ca  - Left side Mass on CXR  - On chemotherapy  - Not on O2  - Will follow up with his oncologist for recurrent brain mets. Plan for gamma knife radiation as out patient    #) h/o HTN  - c/w coreg  spoke to daughter son, antonio updated about plan  [facility]: [facility] [id number] 81 yo male, Cantonese speaking with PMH of Lung ca (dx 05/2018) with mets to brain s/p resection (09/2019)  on chemotherapy- last chemo on Jan 5, HTN, BPH comes to the ED after seizure episode.     #) Seizures with h/o Brain mets s/p Resection   - Likely secondary to recurrent brain mets vs less likely secondary to seizure  - Patient remains AOX2 improving slightly/ more alert secondary to seizure vs mets vs medication induced  - Had 3 episodes of witnessed seizures on admission  - VEEG showed focal cerebral dysfunction in left frontotemporal region  - CT head with stable post surgical changes, might need MRI if concern for new mets  - MRI brain showed 0.6 cm new lesion at the left frontal lobe  - Was on keppra and valproic acid  - Valproic acid stopped as per neurology recommendation. Decrease Keppra to 750mg bid, add Vimpat 50mg for 2 dosed (received) then 100 mg  q12.  - Decrease dexamethasone to 2 mg Iv q 12h as it may worsens confusion and causes psychosis  - Follows with Dr Wasserman in [facility]. Informed about his condition, Was due to chemotherapy today. Plan for follow up with Dr lyles for gamma knife treatment    #Leukocytosis  -WBC 21K-->22K  -No clear focus for infection  -Has a portacath  -procal negative  -Blood cx negative on 1/23, C-xray negative, Blood cx resent  -U/A negative, urine culture enterococcus fecalis Multi sensitive-->DC on Augmentin for 7 days( concerns for CAUTI)    #hx of BPH  -s/p you catheter , passed TOV  -on tamsulosin and finasteride    #) H/o Lung Ca  - Left side Mass on CXR  - On chemotherapy  - Not on O2  - Will follow up with his oncologist for recurrent brain mets. Plan for gamma knife radiation as out patient    #) h/o HTN  - c/w coreg  spoke to daughter son, antonio updated about plan  [facility]: [facility] [id number]    stable for d/c, needs outpt pmd, onc, neuro f/u one week  41 minutes spent on discharge planning. 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 Seen by neuro, underwent veeg demonstrating seizures. Started on vimpat, in addition to keppra. He underwent MRI demonstrated new L temporal lobe lesion. Started on decadron. Found to have LE dvt on duplex, started on lovenox with transition to eliquis. Found to have e faecalis uti as well, started on augmetin. He was stable for d/c on 1/24, needs outpt pmd, onc, neuro f/u one week.    41 minutes spent on discharge planning.

## 2021-01-23 NOTE — PROGRESS NOTE ADULT - SUBJECTIVE AND OBJECTIVE BOX
INTERVAL HPI/OVERNIGHT EVENTS:    SUBJECTIVE: Patient seen and examined at bedside.     no cp, sob, abd pain, fever  no ha, syncope, lightheadedness, dizziness    OBJECTIVE:    VITAL SIGNS:  Vital Signs Last 24 Hrs  T(C): 35.7 (23 Jan 2021 05:07), Max: 37.1 (22 Jan 2021 14:08)  T(F): 96.3 (23 Jan 2021 05:07), Max: 98.7 (22 Jan 2021 14:08)  HR: 76 (23 Jan 2021 05:07) (76 - 109)  BP: 99/56 (23 Jan 2021 05:07) (99/56 - 118/66)  BP(mean): 84 (22 Jan 2021 20:54) (84 - 84)  RR: 18 (23 Jan 2021 05:07) (18 - 18)  SpO2: 97% (22 Jan 2021 20:54) (97% - 98%)      PHYSICAL EXAM:    General: NAD  HEENT: NC/AT; PERRL, clear conjunctiva  Neck: supple  Respiratory: CTA b/l  Cardiovascular: +S1/S2; RRR  Abdomen: soft, NT/ND; +BS x4  Extremities: WWP, 2+ peripheral pulses b/l; no LE edema  Skin: normal color and turgor; no rash  Neurological:    MEDICATIONS:  MEDICATIONS  (STANDING):  carvedilol 3.125 milliGRAM(s) Oral every 12 hours  chlorhexidine 4% Liquid 1 Application(s) Topical <User Schedule>  dexAMETHasone     Tablet 1 milliGRAM(s) Oral two times a day  enoxaparin Injectable 50 milliGRAM(s) SubCutaneous two times a day  finasteride 5 milliGRAM(s) Oral daily  lacosamide 100 milliGRAM(s) Oral two times a day  levETIRAcetam  Solution 750 milliGRAM(s) Oral two times a day  LORazepam   Injectable 1 milliGRAM(s) IV Push once  pantoprazole   Suspension 40 milliGRAM(s) Oral daily  tamsulosin 0.4 milliGRAM(s) Oral at bedtime    MEDICATIONS  (PRN):      ALLERGIES:  Allergies    No Known Allergies    Intolerances        LABS:                        10.2   21.63 )-----------( 167      ( 23 Jan 2021 07:15 )             32.4     Hemoglobin: 10.2 g/dL (01-23 @ 07:15)  Hemoglobin: 11.1 g/dL (01-22 @ 06:02)  Hemoglobin: 11.5 g/dL (01-21 @ 05:48)  Hemoglobin: 11.4 g/dL (01-19 @ 04:30)    CBC Full  -  ( 23 Jan 2021 07:15 )  WBC Count : 21.63 K/uL  RBC Count : 3.37 M/uL  Hemoglobin : 10.2 g/dL  Hematocrit : 32.4 %  Platelet Count - Automated : 167 K/uL  Mean Cell Volume : 96.1 fL  Mean Cell Hemoglobin : 30.3 pg  Mean Cell Hemoglobin Concentration : 31.5 g/dL  Auto Neutrophil # : 15.30 K/uL  Auto Lymphocyte # : 2.03 K/uL  Auto Monocyte # : 1.59 K/uL  Auto Eosinophil # : 0.03 K/uL  Auto Basophil # : 0.02 K/uL  Auto Neutrophil % : 70.7 %  Auto Lymphocyte % : 9.4 %  Auto Monocyte % : 7.4 %  Auto Eosinophil % : 0.1 %  Auto Basophil % : 0.1 %    01-23    143  |  109  |  39<H>  ----------------------------<  107<H>  3.9   |  21  |  0.7    Ca    8.0<L>      23 Jan 2021 07:15  Mg     1.9     01-22    TPro  4.5<L>  /  Alb  2.5<L>  /  TBili  0.3  /  DBili  x   /  AST  20  /  ALT  11  /  AlkPhos  61  01-23    Creatinine Trend: 0.7<--, 0.6<--, 0.6<--, 0.7<--, 0.6<--, 0.6<--  LIVER FUNCTIONS - ( 23 Jan 2021 07:15 )  Alb: 2.5 g/dL / Pro: 4.5 g/dL / ALK PHOS: 61 U/L / ALT: 11 U/L / AST: 20 U/L / GGT: x               hs Troponin:              CSF:                      EKG:   MICROBIOLOGY:    IMAGING:      Labs, imaging, EKG personally reviewed    RADIOLOGY & ADDITIONAL TESTS: Reviewed.

## 2021-01-23 NOTE — DISCHARGE NOTE PROVIDER - CARE PROVIDER_API CALL
Alonzo Esparza)  EEGEpilepsy; Neurology  [facility], [address]  [place] [address]  Phone: [phone]  Fax: [phone]  Follow Up Time: 2 weeks    EDUARDO VICTOR  [id number]  [address] [place] [id number]  Phone: ()-  Fax: ()-  Follow Up Time: 1 week

## 2021-01-23 NOTE — DISCHARGE NOTE PROVIDER - NSDCMRMEDTOKEN_GEN_ALL_CORE_FT
amoxicillin-clavulanate 500 mg-125 mg oral tablet: 1 tab(s) orally 2 times a day   apixaban 5 mg oral tablet: 1 tab(s) orally 2 times a day   Calcium 500+D oral tablet, chewable: 1 tab(s) orally 2 times a day  carvedilol 3.125 mg oral tablet: 1 tab(s) orally once a day  dexamethasone 1 mg oral tablet: 1 tab(s) orally 2 times a day  finasteride 5 mg oral tablet: 1 tab(s) orally once a day  lacosamide 100 mg oral tablet: 1 tab(s) orally 2 times a day MDD:200MG  levETIRAcetam 750 mg oral tablet: 1 tab(s) orally 2 times a day   pantoprazole 40 mg oral delayed release tablet: 1 tab(s) orally once a day (before a meal)  tamsulosin 0.4 mg oral capsule: 1 cap(s) orally once a day   amoxicillin-clavulanate 500 mg-125 mg oral tablet: 1 tab(s) orally 2 times a day   apixaban 5 mg oral tablet: 2 tab(s) orally 2 times a day for 6 days then 1 tab twice daily   Calcium 500+D oral tablet, chewable: 1 tab(s) orally 2 times a day  carvedilol 3.125 mg oral tablet: 1 tab(s) orally once a day  dexamethasone 1 mg oral tablet: 1 tab(s) orally 2 times a day  finasteride 5 mg oral tablet: 1 tab(s) orally once a day  lacosamide 100 mg oral tablet: 1 tab(s) orally 2 times a day MDD:200MG  levETIRAcetam 750 mg oral tablet: 1 tab(s) orally 2 times a day   pantoprazole 40 mg oral delayed release tablet: 1 tab(s) orally once a day (before a meal)  tamsulosin 0.4 mg oral capsule: 1 cap(s) orally once a day

## 2021-01-24 NOTE — PROGRESS NOTE ADULT - SUBJECTIVE AND OBJECTIVE BOX
INTERVAL HPI/OVERNIGHT EVENTS:    SUBJECTIVE: Patient seen and examined at bedside.     no cp, sob, abd pain, fever  no sob, orthopnea, pnd, cough    OBJECTIVE:    VITAL SIGNS:  Vital Signs Last 24 Hrs  T(C): 36.2 (24 Jan 2021 04:59), Max: 36.2 (24 Jan 2021 04:59)  T(F): 97.1 (24 Jan 2021 04:59), Max: 97.1 (24 Jan 2021 04:59)  HR: 76 (24 Jan 2021 04:59) (73 - 99)  BP: 115/69 (24 Jan 2021 04:59) (102/73 - 122/75)  BP(mean): 87 (24 Jan 2021 04:59) (87 - 87)  RR: 18 (24 Jan 2021 04:59) (16 - 18)  SpO2: --      PHYSICAL EXAM:    General: NAD  HEENT: NC/AT; PERRL, clear conjunctiva  Neck: supple  Respiratory: CTA b/l  Cardiovascular: +S1/S2; RRR  Abdomen: soft, NT/ND; +BS x4  Extremities: WWP, 2+ peripheral pulses b/l; no LE edema  Skin: normal color and turgor; no rash  Neurological:    MEDICATIONS:  MEDICATIONS  (STANDING):  amoxicillin  875 milliGRAM(s)/clavulanate 1 Tablet(s) Oral two times a day  apixaban 10 milliGRAM(s) Oral every 12 hours  carvedilol 3.125 milliGRAM(s) Oral every 12 hours  chlorhexidine 4% Liquid 1 Application(s) Topical <User Schedule>  dexAMETHasone     Tablet 1 milliGRAM(s) Oral two times a day  finasteride 5 milliGRAM(s) Oral daily  lacosamide 100 milliGRAM(s) Oral two times a day  levETIRAcetam  Solution 750 milliGRAM(s) Oral two times a day  LORazepam   Injectable 1 milliGRAM(s) IV Push once  pantoprazole   Suspension 40 milliGRAM(s) Oral daily  tamsulosin 0.4 milliGRAM(s) Oral at bedtime    MEDICATIONS  (PRN):      ALLERGIES:  Allergies    No Known Allergies    Intolerances        LABS:                        10.2   21.63 )-----------( 167      ( 23 Jan 2021 07:15 )             32.4     Hemoglobin: 10.2 g/dL (01-23 @ 07:15)  Hemoglobin: 11.1 g/dL (01-22 @ 06:02)  Hemoglobin: 11.5 g/dL (01-21 @ 05:48)    CBC Full  -  ( 23 Jan 2021 07:15 )  WBC Count : 21.63 K/uL  RBC Count : 3.37 M/uL  Hemoglobin : 10.2 g/dL  Hematocrit : 32.4 %  Platelet Count - Automated : 167 K/uL  Mean Cell Volume : 96.1 fL  Mean Cell Hemoglobin : 30.3 pg  Mean Cell Hemoglobin Concentration : 31.5 g/dL  Auto Neutrophil # : 15.30 K/uL  Auto Lymphocyte # : 2.03 K/uL  Auto Monocyte # : 1.59 K/uL  Auto Eosinophil # : 0.03 K/uL  Auto Basophil # : 0.02 K/uL  Auto Neutrophil % : 70.7 %  Auto Lymphocyte % : 9.4 %  Auto Monocyte % : 7.4 %  Auto Eosinophil % : 0.1 %  Auto Basophil % : 0.1 %    01-23    143  |  109  |  39<H>  ----------------------------<  107<H>  3.9   |  21  |  0.7    Ca    8.0<L>      23 Jan 2021 07:15    TPro  4.5<L>  /  Alb  2.5<L>  /  TBili  0.3  /  DBili  x   /  AST  20  /  ALT  11  /  AlkPhos  61  01-23    Creatinine Trend: 0.7<--, 0.6<--, 0.6<--, 0.7<--, 0.6<--, 0.6<--  LIVER FUNCTIONS - ( 23 Jan 2021 07:15 )  Alb: 2.5 g/dL / Pro: 4.5 g/dL / ALK PHOS: 61 U/L / ALT: 11 U/L / AST: 20 U/L / GGT: x               hs Troponin:              CSF:                      EKG:   MICROBIOLOGY:    Culture - Blood (collected 22 Jan 2021 21:59)  Source: .Blood None  Preliminary Report (24 Jan 2021 07:01):    No growth to date.    Culture - Blood (collected 22 Jan 2021 21:59)  Source: .Blood None  Preliminary Report (24 Jan 2021 07:01):    No growth to date.    Culture - Blood (collected 22 Jan 2021 14:42)  Source: .Blood Blood-Peripheral  Preliminary Report (24 Jan 2021 01:02):    No growth to date.      IMAGING:      Labs, imaging, EKG personally reviewed    RADIOLOGY & ADDITIONAL TESTS: Reviewed.

## 2021-01-24 NOTE — PROGRESS NOTE ADULT - ASSESSMENT
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 Found to have new L temporal lobe lesion.    #Seizure disorder, in setting of brain metastases  cth, mri noted, new L temporal lobe lesion  keppra 750 bid  vimpat 100 bid  appreciate neuro  stable for d/c, needs outpt pmd, onc, neuro f/u one week  #UTI, e faecalis  augmentin, plan for 7 day course  bcx ntd  #DVT  noted on bl le duplex  eliquis  #Lung ca, metastatic to brain  decadron 1 bid  #HTN  coreg 3.125 bid  #BPH  finasteride 5  tamsuolsin 0.4  #DVT ppx  eliquis

## 2021-01-24 NOTE — PROGRESS NOTE ADULT - PROVIDER SPECIALTY LIST ADULT
Internal Medicine
Neurology
Internal Medicine

## 2021-01-24 NOTE — PROGRESS NOTE ADULT - REASON FOR ADMISSION
Seizures

## 2021-02-13 PROBLEM — Z87.438 PERSONAL HISTORY OF OTHER DISEASES OF MALE GENITAL ORGANS: Chronic | Status: ACTIVE | Noted: 2021-01-01

## 2021-02-13 PROBLEM — R56.9 UNSPECIFIED CONVULSIONS: Chronic | Status: ACTIVE | Noted: 2021-01-01

## 2021-02-13 NOTE — ED PROVIDER NOTE - NS ED ROS FT
Review of Systems:  CONSTITUTIONAL: No fever, No diaphoresis  SKIN: No rash  HEMATOLOGIC: No abnormal bleeding or bruising  EYES: No eye pain, No blurred vision  ENT: No sore throat, No neck pain, No rhinorrhea  RESPIRATORY: +shortness of breath, No cough  CARDIAC: No chest pain, No palpitations  GI: No abdominal pain, No nausea, No vomiting, No diarrhea, No constipation  : No dysuria, frequency, hematuria.   MUSCULOSKELETAL: No joint paint, No swelling, No back pain  NEUROLOGIC: No numbness, No focal weakness, No headache, No dizziness  All other systems negative, unless specified in HPI

## 2021-02-13 NOTE — ED PROVIDER NOTE - PHYSICAL EXAMINATION
CONSTITUTIONAL: Mild respiratory distress   SKIN: warm, dry  HEAD: Normocephalic; atraumatic.  EYES: no conjunctival injection. EOMI.   ENT: No nasal discharge; airway clear.  NECK: Supple; non tender.  CARD: RRR   RESP: Decreased breath sounds at the bases.   ABD: soft ntnd.    EXT: Normal ROM.  No LE edema.   LYMPH: No acute cervical adenopathy.  NEURO: Alert, oriented, grossly unremarkable. No FND.   PSYCH: Cooperative, appropriate.

## 2021-02-13 NOTE — H&P ADULT - HISTORY OF PRESENT ILLNESS
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  Discussed with son through , patient last saw his oncologist, Dr. Wasserman, on Tuesday and did not feel short of breath at the time.  Patient was having no other symptoms    In the ED he was satting 81% on room air which improved to 100% with nonrebreather.  He had a CTA done showing a large right pleural effusion and subsegmental PEs.  In the ED he received 1L LR and cefepime/vanc.    Triage vitals: /99    RR 30  Temp 97.6  SpO2 81% on room air

## 2021-02-13 NOTE — H&P ADULT - NSHPPHYSICALEXAM_GEN_ALL_CORE
General: WN/WD NAD  Neurology: A&Ox3, nonfocal, PARRY x 4  Head:  Normocephalic, atraumatic  ENT:  Mucosa moist, no ulcerations  Neck:  Supple, no sinuses or palpable masses  Lymphatic:  No palpable cervical, supraclavicular, axillary adenopathy  Respiratory: wheezing in all lung fields bilaterally  CV: regular tachycardic, S1S2, no murmur  Abdominal: Soft, NT, ND no palpable mass  MSK: left lower extremity +1 edema  Incisions: intact, no erythema or drainage

## 2021-02-13 NOTE — H&P ADULT - NSHPLABSRESULTS_GEN_ALL_CORE
9.2    18.41 )-----------( 175      ( 13 Feb 2021 16:58 )             28.5     02-13    141  |  102  |  22<H>  ----------------------------<  155<H>  3.6   |  25  |  0.9    Ca    8.0<L>      13 Feb 2021 16:18  Mg     2.0     02-13    TPro  4.8<L>  /  Alb  2.4<L>  /  TBili  0.4  /  DBili  x   /  AST  24  /  ALT  20  /  AlkPhos  72  02-13      Troponin T, Serum: 0.07: Critical value:     1.  Pulmonary emboli within the subsegmental branches of the bilateral upper lobes and left lower lobe.  2.  Left lower lobe 5.3 cm mass and additional smaller 1 cm mass in the left upper lobe. Findings are consistent with patient's known malignancy.  3.  Large right pleural effusion with adjacent compressive atelectasis.  4.  Multiple bony sclerotic lesions suspicious for metastatic disease.

## 2021-02-13 NOTE — ED ADULT NURSE NOTE - NSIMPLEMENTINTERV_GEN_ALL_ED
Implemented All Fall Risk Interventions:  [place] to call system. Call bell, personal items and telephone within reach. Instruct patient to call for assistance. Room bathroom lighting operational. Non-slip footwear when patient is off stretcher. Physically safe environment: no spills, clutter or unnecessary equipment. Stretcher in lowest position, wheels locked, appropriate side rails in place. Provide visual cue, wrist band, yellow gown, etc. Monitor gait and stability. Monitor for mental status changes and reorient to person, place, and time. Review medications for side effects contributing to fall risk. Reinforce activity limits and safety measures with patient and family. Implemented All Fall with Harm Risk Interventions:  [place] to call system. Call bell, personal items and telephone within reach. Instruct patient to call for assistance. Room bathroom lighting operational. Non-slip footwear when patient is off stretcher. Physically safe environment: no spills, clutter or unnecessary equipment. Stretcher in lowest position, wheels locked, appropriate side rails in place. Provide visual cue, wrist band, yellow gown, etc. Monitor gait and stability. Monitor for mental status changes and reorient to person, place, and time. Review medications for side effects contributing to fall risk. Reinforce activity limits and safety measures with patient and family. Provide visual clues: red socks.

## 2021-02-13 NOTE — CONSULT NOTE ADULT - SUBJECTIVE AND OBJECTIVE BOX
INTERVENTIONAL RADIOLOGY CONSULT:     Procedure Requested: Pulmonary artery thrombectomy    HPI: 79 y/o Cantonese speaking M PMHx lung CA mets to brain with resection on chemo, HTN, BPH, DVT (not on A/C due to GI bleeds) presents to ED with shortness of breath for three days. Per daughter patient was more short of breath than usual today prompting ED visit. No chest pain, vomiting, leg swelling.      PAST MEDICAL & SURGICAL HISTORY:  AAA (abdominal aortic aneurysm)    History of BPH    Seizure    Lung cancer    S/P AAA repair    History of lung surgery      Allergies    No Known Allergies      Physical Exam:   Vital Signs Last 24 Hrs  T(C): 37.9 (13 Feb 2021 15:49), Max: 37.9 (13 Feb 2021 15:49)  T(F): 100.3 (13 Feb 2021 15:49), Max: 100.3 (13 Feb 2021 15:49)  HR: 117 (13 Feb 2021 18:39) (117 - 134)  BP: 135/90 (13 Feb 2021 18:39) (120/78 - 135/90)  BP(mean): --  RR: 20 (13 Feb 2021 18:39) (20 - 30)  SpO2: 99% (13 Feb 2021 18:39) (81% - 99%)      Labs:                         9.2    18.41 )-----------( 175      ( 13 Feb 2021 16:58 )             28.5     02-13    141  |  102  |  22<H>  ----------------------------<  155<H>  3.6   |  25  |  0.9    Ca    8.0<L>      13 Feb 2021 16:18  Mg     2.0     02-13    TPro  4.8<L>  /  Alb  2.4<L>  /  TBili  0.4  /  DBili  x   /  AST  24  /  ALT  20  /  AlkPhos  72  02-13        Pertinent labs:                      9.2    18.41 )-----------( 175      ( 13 Feb 2021 16:58 )             28.5       02-13    141  |  102  |  22<H>  ----------------------------<  155<H>  3.6   |  25  |  0.9    Ca    8.0<L>      13 Feb 2021 16:18  Mg     2.0     02-13    TPro  4.8<L>  /  Alb  2.4<L>  /  TBili  0.4  /  DBili  x   /  AST  24  /  ALT  20  /  AlkPhos  72  02-13          Radiology & Additional Studies:     Radiology imaging reviewed.       ASSESSMENT/ PLAN:     CT Chest reviewed and case discussed with pulmonary fellow. 80 year old male admitted with SOB and requiring additional supplemental O2 requirements. Hx of metastatic lung CA with brain mets. CT Chest demonstrated lung mass, pleural effusion and small subsegmental PE.    - no role for thrombectomy given such small amount of thrombus. Hypoxia is likely multifactorial.  - due to high risk of AC, especially given history of melena in past, would consider placing IVC filter pending GOC discussion with family.  - if family wants to proceed, will schedule for filter placement      Thank you for the courtesy of this consult, please call [phone] with any further questions.

## 2021-02-13 NOTE — H&P ADULT - ATTENDING COMMENTS
81 YO M with a PMH of lung CA with mets to the brain s/p resection currently on CT, HTN, BPH, and DVT (no AC due to hx of GIB) who presents to the hospital with a c/o SOB for the past x 3 days. Denies any CP, LE swelling, ABD pain, fevers/chills, or palpitations. In the ED, Dimer elevated and CTA-chest showed multiple B/L subsegmental PEs, masses in the left upper/lower lobe, right-sided pleural effusion, and bony sclerotic changes suspicious for metastasis. IR was consulted and states that there is no acute intervention for thrombectomy but would place IVC filter if the family wanted.     Pt not started on AC due to recent GIB w/ Apixaban. ED staff spoke with ICU and they asked for a CTH and if the CTH was negative the pt could be started on a heparin drip. CTH was cancelled by CT tech because the pt recently received contrast for CTA and it would have given a false positive. Pt was hypoxic to 82% on RA and started on NRB. Pt started on IV steroids, IVFs (LR), and IV ABXs (Cefe/Vanco) in the ED.     Physical exam shows pt in NAD. VSS, afebrile, not hypoxic on NRB. A&Ox3. Non-focal neuro exam. Muscle strength/sensation intact. Decreased breath sounds over the right lower lung fields with scattered expiratory wheezes B/L. RRR, no M/G/R. ABD is soft and non-tender, normoactive BSs. LEs without swelling. No rashes. Labs and radiology as above.    Acute hypoxic respiratory failure, likely multifactorial from PE, right-sided pleural effusion, and lung CA w/ possible post-obstructive pneumonia.     B/L subsegmental pulmonary embolisms, no right heart strain present; provoked. Obtain official echo. AC is currently contraindicated (recent GIB and brain mets). Serial cardiac enzymes. Monitor VSs. Supplemental O2 PRN. IR is following, awaiting family decision on IVC filter placement.     Right-sided pleural effusion, likely malignant. Pulm consult. Supplemental O2 PRN.     Hx of lung CA w/ possible post-obstructive pneumonia; sepsis present on admission. IV ABXs (Cefepime/Vanco). IVFs. FU sputum/blood cultures. Anti-tussives PRN. Strep and legionella. MRSA nares. Send Procal/CRP. Heme/Onc consult.     HX of HTN and BPH. Restart home meds, except as stated above. DVT PPX. Inform PCP of pt's admission to hospital. My note supersedes the residents note.

## 2021-02-13 NOTE — H&P ADULT - ASSESSMENT
80 year old male with hx of lung cancer (dx 2018) with mets to the brain s/p resection (2019) currently on chemo last session early January, HTN, BPH, DVT not on AC because of GI bleeds, presents with shortness of breath of three days duration    # Hypoxemic respiratory failure likely secondary to large right likely malignant pleural effusion in the setting of COPD complicated by subsegmental PE, rule out postobstructive pneumonia  - currently saturating 100% on NRB, was wheezing on my exam starting on bronchodilators and give one dose of solumedrol 60 now and continue home dose of dexa 1 bid (for brain mets)  - wbc 18.41 no left shift, likely from chronic steroids or reactive to PE, possible post-obstructive pna  - will continue with vanc/cefepime for now to cover post-obstructive pneumonia, f/u procal and MRSA nares, covid negative  - starting symbicort / duonebs / spiriva / albuterol prn  - f/u pulm, will likely need chest tube and pleurx  - unlikely candidate for AC considering brain mets, f/u heme onc and IR    # Subsegmental PE  - unlikely to be sole contributor to hypoxia considering his large pleural effusion and wheezing on exam  - likely not a candidate for AC    # Lung cancer with mets to brain s/p resection  - was on pembrolizumab/alimta last year, had last session in January son doesn' t remember what type of chemo  - follows with Dr. Wasserman in [facility], last saw him in the office on Tuesday  - DVT/PE likely secondary to cancer, likely not a candidate for AC considering brain mets, f/u heme onc  - c/w seizure PPX with lacosamide and keppra, f/u levels in the AM 80 year old male with hx of lung cancer (dx 2018) with mets to the brain s/p resection (2019) currently on chemo last session early January, HTN, BPH, DVT not on AC because of GI bleeds, presents with shortness of breath of three days duration    # Hypoxemic respiratory failure likely secondary to large right likely malignant pleural effusion in the setting of COPD complicated by subsegmental PE, rule out postobstructive pneumonia  - currently saturating 100% on NRB, was wheezing on my exam starting on bronchodilators and give one dose of solumedrol 60 now and continue home dose of dexa 1 bid (for brain mets)  - wbc 18.41 no left shift, likely from chronic steroids or reactive to PE, possible post-obstructive pna  - will continue with vanc/cefepime for now to cover post-obstructive pneumonia, f/u procal and MRSA nares, covid negative  - starting symbicort / duonebs / spiriva / albuterol prn  - f/u pulm, will likely need chest tube and pleurx  - unlikely candidate for AC considering brain mets, f/u heme onc and IR    # Subsegmental PE  - unlikely to be sole contributor to hypoxia considering his large pleural effusion and wheezing on exam  - likely not a candidate for AC  - f/u trop trend and echo    # Lung cancer with mets to brain s/p resection  - was on pembrolizumab/alimta last year, had last session in January son doesn' t remember what type of chemo  - follows with Dr. Wasserman in [facility], last saw him in the office on Tuesday  - DVT/PE likely secondary to cancer, likely not a candidate for AC considering brain mets, f/u heme onc  - c/w seizure PPX with lacosamide and keppra, f/u levels in the AM 80 year old male with hx of lung cancer (dx 2018) with mets to the brain s/p resection (2019) currently on chemo last session early January, HTN, BPH, DVT not on AC because of GI bleeds, presents with shortness of breath of three days duration    # Hypoxemic respiratory failure likely secondary to large right likely malignant pleural effusion in the setting of COPD complicated by subsegmental PE, rule out postobstructive pneumonia  - currently saturating 100% on NRB, was wheezing on my exam starting on bronchodilators and give one dose of solumedrol 60 now and continue home dose of dexa 1 bid (for brain mets)  - wbc 18.41 no left shift, likely from chronic steroids or reactive to PE, possible post-obstructive pna  - will continue with vanc/cefepime for now to cover post-obstructive pneumonia, f/u procal and MRSA nares, covid negative  - starting symbicort / duonebs / spiriva / albuterol prn  - f/u pulm, will likely need chest tube and pleurx  - unlikely candidate for AC considering brain mets, f/u heme onc and IR  - f/u ID    # Subsegmental PE  - unlikely to be sole contributor to hypoxia considering his large pleural effusion and wheezing on exam  - likely not a candidate for AC  - f/u trop trend and echo    # Lung cancer with mets to brain s/p resection  - was on pembrolizumab/alimta last year, had last session in January son doesn' t remember what type of chemo  - follows with Dr. Wasserman in [facility], last saw him in the office on Tuesday  - DVT/PE likely secondary to cancer, likely not a candidate for AC considering brain mets, f/u heme onc  - c/w seizure PPX with lacosamide and keppra, f/u levels in the AM    # Abdominal aortic aneurysm and palliative stent as per son  - c/w carvedilol 3.125 bid    # BPH  - c/w tamsulosin and finasteride    PLAN: f/u IR on IVC filter, f/u heme onc on AC, f/u pulm for large right pleural effusion, discussed prognosis with son and he was amenable to speak with palliative regarding keeping his father comfortable and plans going forward    # DVT PPX: holding because of brain mets, no SCDs because of DVT  # GI PPX: protonix  # Diet: soft  DNR, not DNI for now 80 year old male with hx of lung cancer (dx 2018) with mets to the brain s/p resection (2019) currently on chemo last session early January, HTN, BPH, DVT not on AC because of GI bleeds, presents with shortness of breath of three days duration    # Hypoxemic respiratory failure likely secondary to large right likely malignant pleural effusion in the setting of COPD complicated by subsegmental PE, rule out postobstructive pneumonia  - currently saturating 100% on NRB, was wheezing on my exam starting on bronchodilators and give one dose of solumedrol 60 now and continue home dose of dexa 1 bid (for brain mets)  - wbc 18.41 no left shift, likely from chronic steroids or reactive to PE, possible post-obstructive pna  - will continue with vanc/cefepime for now to cover post-obstructive pneumonia, f/u procal and MRSA nares, covid negative  - starting symbicort / duonebs / spiriva / albuterol prn  - f/u pulm, will likely need chest tube and pleurx  - unlikely candidate for AC considering brain mets, f/u heme onc and IR  - f/u ID    # Subsegmental PE  - unlikely to be sole contributor to hypoxia considering his large pleural effusion and wheezing on exam  - likely not a candidate for AC  - f/u trop trend and echo    # Lung cancer with mets to brain s/p resection  - was on pembrolizumab/alimta last year, had last session in January son doesn' t remember what type of chemo  - follows with Dr. Wasserman in [facility], last saw him in the office on Tuesday  - DVT/PE likely secondary to cancer, likely not a candidate for AC considering brain mets, f/u heme onc  - c/w seizure PPX with lacosamide and keppra, f/u levels in the AM    # Abdominal aortic aneurysm and palliative stent as per son  - c/w carvedilol 3.125 bid    # BPH  - c/w tamsulosin and finasteride    PLAN: f/u IR on IVC filter, f/u heme onc on AC, f/u pulm for large right pleural effusion, discussed prognosis with son and he was amenable to speak with palliative regarding keeping his father comfortable and plans going forward, consider discussing findings here with Dr. Wasserman ([facility] onc) and having him discuss with the family as well considering language barrier/familiarity with the patient's course    # DVT PPX: holding because of brain mets, no SCDs because of DVT  # GI PPX: protonix  # Diet: soft  DNR, not DNI for now

## 2021-02-13 NOTE — ED PROVIDER NOTE - OBJECTIVE STATEMENT
79 y/o Cantonese speaking M PMHx lung CA mets to brain with resection on chemo, HTN, BPH, DVT (not on A/C due to GI bleeds) presents to ED with shortness of breath for three days. Per daughter patient was more short of breath than usual today prompting ED visit. No chest pain, vomiting, leg swelling.

## 2021-02-13 NOTE — ED ADULT NURSE REASSESSMENT NOTE - NS ED NURSE REASSESS COMMENT FT1
Patient with HR of 170. EKG done. MD notified. Patients heart rate back to 120 without intervention. Patient with HR of 170. Patient doesn't appear in acute distress. EKG done. MD notified. Patients heart rate back to 120 without intervention. Cardiac monitoring continues.

## 2021-02-13 NOTE — ED PROVIDER NOTE - CLINICAL SUMMARY MEDICAL DECISION MAKING FREE TEXT BOX
80 yr old m w/ a pmh significant for lung cancer mets to the brain w/ resection on chemo, htn, bph, dvt  (not on ac due to past gi bleeds) who presents with sob. pt found to have subsegmental and segmental PE, pleural effusion and worsening lung mass. labs, imaging, ekg obtained. pt admitted to SDU.

## 2021-02-13 NOTE — H&P ADULT - NSICDXPASTMEDICALHX_GEN_ALL_CORE_FT
PAST MEDICAL HISTORY:  AAA (abdominal aortic aneurysm)     History of BPH     Lung cancer     Seizure

## 2021-02-13 NOTE — ED PROVIDER NOTE - CARE PLAN
Principal Discharge DX:	Sepsis  Secondary Diagnosis:	Pleural effusion  Secondary Diagnosis:	Acute hypoxemic respiratory failure  Secondary Diagnosis:	Elevated troponin   Principal Discharge DX:	Pulmonary embolism  Secondary Diagnosis:	Pleural effusion  Secondary Diagnosis:	Acute hypoxemic respiratory failure  Secondary Diagnosis:	Elevated troponin  Secondary Diagnosis:	Sepsis

## 2021-02-13 NOTE — ED PROVIDER NOTE - PROGRESS NOTE DETAILS
ALEX: Case endorsed to Dr. Mai pending CTA chest, remainder of labs, re-eval, dispo. Sergei: received pt on signout. pt on non rebreather not in any distress. CTA chest and labs pending. pt to be admitted. will continue to monitor. TC: Pt signed out to me by Dr. Lazo. 79 yo M with PMHx of BPH, lung cancer with brain mets s/p resection/chemo, seizure who presents with sob x3 days. In ED, tachycardic 120s, rectal temp 100.3, tachypneic, satting 99% on 15L NRB. Received 1L IVF, cefepime, vanc. Labs notable for WBC 18, hgb and BUN/Cr at baseline, trop 0.07, BNP 1003, lactate 3.8. Ekg with sinus tachy. Cxr with new R pleural effusion. Covid neg. On my exam, pt with bilateral wheezing, satting 99% on 12L NRB. Pending CTPE read. TC: Pt signed out to me by Dr. Lazo. 79 yo M with PMHx of BPH, lung cancer with brain mets s/p resection/chemo, seizure, AAA s/p stent who presents with sob x3 days. In ED, tachycardic 120s, rectal temp 100.3, tachypneic, satting 99% on 15L NRB. Received 1L IVF, cefepime, vanc. Labs notable for WBC 18, hgb and BUN/Cr at baseline, trop 0.07, BNP 1003, lactate 3.8. Ekg with sinus tachy. Cxr with new R pleural effusion. Covid neg. On my exam, pt with bilateral wheezing, satting 99% on 12L NRB. Pending CTPE read. TC: Pt signed out to me by Dr. Lazo. 79 yo M with PMHx of BPH, lung cancer with brain mets s/p resection/chemo, seizure, AAA s/p stent who presents with sob x3 days. Recently admitted for seizures, found to have new L temporal lobe mass and started on keppra. During past admission found to have DVT and started on lovenox, switched to eliquis but d/c'ed after <1 wk due to blood in stool. Has not been on AC x11 days. In ED, tachycardic 120s, rectal temp 100.3, tachypneic, satting 99% on 15L NRB. Received 1L IVF, cefepime, vanc. Labs notable for WBC 18, hgb and BUN/Cr at baseline, trop 0.07, BNP 1003, lactate 3.8. Ekg with sinus tachy. Cxr with new R pleural effusion. Covid neg. On my exam, pt with bilateral wheezing, satting 99% on 12L NRB. Pending CTPE read. TC: CTA shows PE within subsegmental branches of bilateral upper lobes and LLL, large R pleural effusion. Spoke with ICU fellow Dr. Herman who will activate PERT team, recommends lung US to r/o loculated effusion to see if pt requires chest tube. TC: Spoke with ICU fellow again, recommends CT head, if negative start heparin gtt and admit to stepdown unit approved by Dr. Velasquez. TC: Spoke with ICU fellow again, recommends CT head, if negative start heparin gtt and admit to stepdown unit approved by Dr. Darling. TC: Called by CT tech that cannot perform noncon CT head because pt just had IV contrast so may show false positive. Spoke with ICU attending Dr. Morales who is aware, inpatient team can reorder CT head later, will hold off heparin until after that. Inpatient team will f/u duplex. Will admit. TC: Called by CT tech that cannot perform noncon CT head because pt just had IV contrast so may show false positive. Spoke with ICU attending Dr. Morales who is aware, inpatient team can reorder CT head later, will hold off heparin until after that. Inpatient team will f/u duplex. Repeat lactate improved. Will admit.

## 2021-02-13 NOTE — ED PROVIDER NOTE - ATTENDING CONTRIBUTION TO CARE
80 year old male, pmhx lung CA with mets to brain, DVT, formerly on eliquis but dc 11 days ago because of blood in his stool, presenting with worsening dyspnea x 3 days, acutely worsening this AM. Patient cantonese speaking but daughter at bedside providing hx. Patient has home RN who cares for him, but per daughter RN noted increased WOB this AM and therefore recommended patient come to the ED. Otherwise per daughter no known fevers, chest pain, N/V/D, abdominal pain or leg swelling beyond baseline. Patient denies pain at this time.    Vital Signs: I have reviewed the initial vital signs.  Constitutional: Well-nourished, appears stated age, (+) mild respiratory distress  HEENT: Airway patent, moist MM, no erythema/swelling/deformity of oral structures. EOMI, PERRLA.  CV: regular rate, regular rhythm, well-perfused extremities, 2+ b/l DP and radial pulses equal.  Lungs: (+) slightly decreased breath sounds on the left with (+) mildly increased WOB  ABD: NTND, no guarding or rebound, no pulsatile mass, no hernias.   MSK: Neck supple, nontender, nl ROM, no stepoff. Chest nontender. Back nontender in TLS spine or to b/l bony structures or flanks. Ext nontender, nl rom, no deformity.   INTEG: Skin warm, dry, no rash.  NEURO: A&Ox3, normal strength, nl sensation throughout, normal speech.   PSYCH: Calm, cooperative, normal affect and interaction.    Will obtain EKG, labs, CXR, CTA chest to r/o PE. Patient originally hypoxic on RA but this improved with NRB.

## 2021-02-14 NOTE — CONSULT NOTE ADULT - SUBJECTIVE AND OBJECTIVE BOX
Patient is a 80y old  Male who presents with a chief complaint of shortness of breath (14 Feb 2021 09:58)      HPI:  80 year old male with hx of lung cancer (dx 2018) with mets to the brain s/p resection (2019) currently on chemo last session early January, HTN, BPH, DVT not on AC because of GI bleeds, presents with shortness of breath of three days duration.  Discussed with son through , patient last saw his oncologist, Dr. Wasserman, on Tuesday and did not feel short of breath at the time.  Patient was having no other symptoms    In the ED he was satting 81% on room air which improved to 100% with nonrebreather.  He had a CTA done showing a large right pleural effusion and subsegmental PEs.  In the ED he received 1L LR and cefepime/vanc.    Triage vitals: /99    RR 30  Temp 97.6  SpO2 81% on room air (13 Feb 2021 23:03)    Oncology history - obtained from Son- Rhina ([phone])- via interpretor services- ID: [id number]    Patient was diagnosed with Stage 4 NSCLC in 2018 and has been on systemic therapy and follows with Dr Wasserman at [facility]. He is unsure of the current regimen and his last chemo dose was on Feb 5th. He was recently admitted to [facility] in Jan and was founf to have LE DVT and was Dced on Eliquis. Pt was on it for 7 days and then started having GI bleed with melena and son stated that his counts fell. He discussed this with the Oncologist and his eliquis was stopped.   He is now found to have large Rt Pl effusion, lung mass and b/l subsegmental PE and he is on NRB at this time        ROS:  Negative except for:    PAST MEDICAL & SURGICAL HISTORY:  AAA (abdominal aortic aneurysm)    History of BPH    Seizure    Lung cancer    S/P AAA repair    History of lung surgery        SOCIAL HISTORY:  former smoker, no alcohol use, no illicit drug abuse      MEDICATIONS  (STANDING):  albuterol/ipratropium for Nebulization 3 milliLiter(s) Nebulizer every 6 hours  budesonide 160 MICROgram(s)/formoterol 4.5 MICROgram(s) Inhaler 2 Puff(s) Inhalation two times a day  carvedilol 3.125 milliGRAM(s) Oral every 12 hours  dexAMETHasone     Tablet 1 milliGRAM(s) Oral two times a day  finasteride 5 milliGRAM(s) Oral daily  lacosamide 100 milliGRAM(s) Oral two times a day  levETIRAcetam  IVPB 750 milliGRAM(s) IV Intermittent every 12 hours  pantoprazole    Tablet 40 milliGRAM(s) Oral before breakfast  piperacillin/tazobactam IVPB.. 3.375 Gram(s) IV Intermittent every 6 hours  tamsulosin 0.4 milliGRAM(s) Oral at bedtime  tiotropium 18 MICROgram(s) Capsule 1 Capsule(s) Inhalation every 24 hours    MEDICATIONS  (PRN):  ALBUTerol    90 MICROgram(s) HFA Inhaler 2 Puff(s) Inhalation every 6 hours PRN Shortness of Breath and/or Wheezing    Height (cm): 170.2 (02-13-21 @ 15:19)  Weight (kg): 54 (02-14-21 @ 00:31)  BMI (kg/m2): 18.6 (02-14-21 @ 00:31)  BSA (m2): 1.62 (02-14-21 @ 00:31)  Allergies    No Known Allergies    Intolerances        Vital Signs Last 24 Hrs  T(C): 36.6 (14 Feb 2021 08:00), Max: 37.9 (13 Feb 2021 15:49)  T(F): 97.8 (14 Feb 2021 08:00), Max: 100.3 (13 Feb 2021 15:49)  HR: 100 (14 Feb 2021 08:00) (99 - 170)  BP: 111/71 (14 Feb 2021 08:00) (111/71 - 135/90)  BP(mean): 80 (14 Feb 2021 08:00) (80 - 90)  RR: 20 (14 Feb 2021 08:00) (20 - 30)  SpO2: 98% (14 Feb 2021 08:00) (81% - 100%)    PHYSICAL EXAM  General: On NRB. Sick looking   CV: normal S1/S2   Lungs: positive air movement b/l ant lungs  Abdomen: soft non-tender  Ext: no clubbing cyanosis or edema  Neuro: awake and alert. On NRB for O2 support     LABS:                          9.1    17.65 )-----------( 172      ( 14 Feb 2021 04:30 )             29.2         Mean Cell Volume : 97.0 fL  Mean Cell Hemoglobin : 30.2 pg  Mean Cell Hemoglobin Concentration : 31.2 g/dL  Auto Neutrophil # : 16.07 K/uL  Auto Lymphocyte # : 0.99 K/uL  Auto Monocyte # : 0.20 K/uL  Auto Eosinophil # : 0.00 K/uL  Auto Basophil # : 0.05 K/uL  Auto Neutrophil % : 91.1 %  Auto Lymphocyte % : 5.6 %  Auto Monocyte % : 1.1 %  Auto Eosinophil % : 0.0 %  Auto Basophil % : 0.3 %      Serial CBC's  02-14 @ 04:30  Hct-29.2 / Hgb-9.1 / Plat-172 / RBC-3.01 / WBC-17.65  Serial CBC's  02-13 @ 16:58  Hct-28.5 / Hgb-9.2 / Plat-175 / RBC-3.04 / WBC-18.41      02-14    133<L>  |  100  |  18  ----------------------------<  191<H>  3.4<L>   |  21  |  0.6<L>    Ca    7.1<L>      14 Feb 2021 04:30  Mg     1.8     02-14    TPro  3.8<L>  /  Alb  2.2<L>  /  TBili  0.4  /  DBili  x   /  AST  23  /  ALT  17  /  AlkPhos  69  02-14    < from: CT Angio Chest PE Protocol w/ IV Cont (02.13.21 @ 18:31) >  MPRESSION:  1.  Pulmonary emboli within the subsegmental branches of the bilateral upper lobes and left lower lobe.  2.  Left lower lobe 5.3 cm mass and additional smaller 1 cm mass in the left upper lobe. Findings are consistent with patient's known malignancy.  3.  Large right pleural effusion with adjacent compressive atelectasis.  4.  Multiple bony sclerotic lesions suspicious for metastatic disease.        < end of copied text >  < from: [facility] Duplex Lower Ext Vein Scan, Bilat (01.22.21 @ 18:53) >    Impression:  Acute thrombus rightperoneal vein  No evidence of DVT in the left leg.    < end of copied text >  < from: MR Head w/wo IV Cont (01.18.21 @ 14:50) >  IMPRESSION:    In comparison with the prior MRI of the brain dated November 1, 2020:    The current examination is limited by motion artifact.    Abnormal signal in the left frontal lobe and extra-axial fluid collection beneath the craniotomy site overall appear smaller likely representing resolving postoperative changes.    New small (0.4 cm) enhancing lesion in the left temporal lobe, likely representing metastatic disease in view of the patient's stated clinical history of metastatic.    Mild progression of corpus callosal atrophy.    < end of copied text >

## 2021-02-14 NOTE — CONSULT NOTE ADULT - ASSESSMENT
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 Bone mets   COVID-19 NG    RECOMMENDATIONS;  Eduardo REGALADO  BCx  Zosyn 3.375 gm iv q6h  d/c vanco/cefepime    Prognosis is very poor

## 2021-02-14 NOTE — CONSULT NOTE ADULT - SUBJECTIVE AND OBJECTIVE BOX
Patient is a 80y old  Male who presents with a chief complaint of shortness of breath (14 Feb 2021 08:28)      HPI:  80 year old male with hx of lung cancer (dx 2018) with mets to the brain s/p resection (2019) currently on chemo last session early January, HTN, BPH, DVT not on AC because of GI bleeds, presents with shortness of breath of three days duration.  Discussed with son through , patient last saw his oncologist, Dr. Wasserman, on Tuesday and did not feel short of breath at the time.  Patient was having no other symptoms    In the ED he was satting 81% on room air which improved to 100% with nonrebreather.  He had a CTA done showing a large right pleural effusion and subsegmental PEs.  In the ED he received 1L LR and cefepime/vanc.    Triage vitals: /99    RR 30  Temp 97.6  SpO2 81% on room air (13 Feb 2021 23:03)      PAST MEDICAL & SURGICAL HISTORY:  AAA (abdominal aortic aneurysm)    History of BPH    Seizure    Lung cancer    S/P AAA repair    History of lung surgery        SOCIAL HX:   Smoking                         ETOH                            Other    FAMILY HISTORY:  :  No known cardiovacular family hisotry     Review Of Systems:     All ROS are negative except per HPI       Allergies    No Known Allergies    Intolerances          PHYSICAL EXAM    ICU Vital Signs Last 24 Hrs  T(C): 36.6 (14 Feb 2021 08:00), Max: 37.9 (13 Feb 2021 15:49)  T(F): 97.8 (14 Feb 2021 08:00), Max: 100.3 (13 Feb 2021 15:49)  HR: 100 (14 Feb 2021 08:00) (99 - 170)  BP: 111/71 (14 Feb 2021 08:00) (111/71 - 135/90)  BP(mean): 80 (14 Feb 2021 08:00) (80 - 90)  ABP: --  ABP(mean): --  RR: 20 (14 Feb 2021 08:00) (20 - 30)  SpO2: 98% (14 Feb 2021 08:00) (81% - 100%)      CONSTITUTIONAL:  ill appearing  NAD    ENT:   Airway patent,   Mouth with normal mucosa.   No thrush      CARDIAC:   Normal rate,   Regular rhythm.    No edema      Vascular:   normal systolic impulse  no bruits    RESPIRATORY:   No wheezing  decreased bs right base  Not tachypneic,  No use of accessory muscles    GASTROINTESTINAL:  Abdomen soft,   Non-tender,   No guarding,     NEUROLOGICAL:   Alert and oriented   No motor deficits.    SKIN:   Skin normal color for race,   No evidence of rash.              LABS:                          9.1    17.65 )-----------( 172      ( 14 Feb 2021 04:30 )             29.2                                               02-14    133<L>  |  100  |  18  ----------------------------<  191<H>  3.4<L>   |  21  |  0.6<L>    Ca    7.1<L>      14 Feb 2021 04:30  Mg     1.8     02-14    TPro  3.8<L>  /  Alb  2.2<L>  /  TBili  0.4  /  DBili  x   /  AST  23  /  ALT  17  /  AlkPhos  69  02-14                                                 CARDIAC MARKERS ( 13 Feb 2021 16:18 )  x     / 0.07 ng/mL / x     / x     / x                                                LIVER FUNCTIONS - ( 14 Feb 2021 04:30 )  Alb: 2.2 g/dL / Pro: 3.8 g/dL / ALK PHOS: 69 U/L / ALT: 17 U/L / AST: 23 U/L / GGT: x                                                                                                                                       X-Rays reviewed                                                                                     ECHO    CXR interpreted by me     MEDICATIONS  (STANDING):  albuterol/ipratropium for Nebulization 3 milliLiter(s) Nebulizer every 6 hours  budesonide 160 MICROgram(s)/formoterol 4.5 MICROgram(s) Inhaler 2 Puff(s) Inhalation two times a day  carvedilol 3.125 milliGRAM(s) Oral every 12 hours  cefepime   IVPB 1000 milliGRAM(s) IV Intermittent every 8 hours  dexAMETHasone     Tablet 1 milliGRAM(s) Oral two times a day  finasteride 5 milliGRAM(s) Oral daily  lacosamide 100 milliGRAM(s) Oral two times a day  levETIRAcetam  IVPB 750 milliGRAM(s) IV Intermittent every 12 hours  pantoprazole    Tablet 40 milliGRAM(s) Oral before breakfast  tamsulosin 0.4 milliGRAM(s) Oral at bedtime  tiotropium 18 MICROgram(s) Capsule 1 Capsule(s) Inhalation every 24 hours  vancomycin  IVPB 1000 milliGRAM(s) IV Intermittent every 12 hours    MEDICATIONS  (PRN):  ALBUTerol    90 MICROgram(s) HFA Inhaler 2 Puff(s) Inhalation every 6 hours PRN Shortness of Breath and/or Wheezing         Patient is a 80y old  Male who presents with a chief complaint of shortness of breath (14 Feb 2021 08:28)      HPI:  80 year old male with hx of lung cancer (dx 2018) with mets to the brain s/p resection (2019) currently on chemo last session early January, HTN, BPH, DVT not on AC because of GI bleeds, presents with shortness of breath of three days duration.  Discussed with son through , patient last saw his oncologist, Dr. Wasserman, on Tuesday and did not feel short of breath at the time.  Patient was having no other symptoms    In the ED he was satting 81% on room air which improved to 100% with nonrebreather.  He had a CTA done showing a large right pleural effusion and subsegmental PEs.  In the ED he received 1L LR and cefepime/vanc.    Triage vitals: /99    RR 30  Temp 97.6  SpO2 81% on room air (13 Feb 2021 23:03) called to evaluate      PAST MEDICAL & SURGICAL HISTORY:  AAA (abdominal aortic aneurysm)    History of BPH    Seizure    Lung cancer    S/P AAA repair    History of lung surgery        SOCIAL HX:   Smoking   uto    FAMILY HISTORY:  :  No known cardiovacular family hisotry     Review Of Systems:     All ROS are negative except per HPI       Allergies    No Known Allergies    Intolerances          PHYSICAL EXAM    ICU Vital Signs Last 24 Hrs  T(C): 36.6 (14 Feb 2021 08:00), Max: 37.9 (13 Feb 2021 15:49)  T(F): 97.8 (14 Feb 2021 08:00), Max: 100.3 (13 Feb 2021 15:49)  HR: 100 (14 Feb 2021 08:00) (99 - 170)  BP: 111/71 (14 Feb 2021 08:00) (111/71 - 135/90)  BP(mean): 80 (14 Feb 2021 08:00) (80 - 90)  RR: 20 (14 Feb 2021 08:00) (20 - 30)  SpO2: 98% (14 Feb 2021 08:00) (81% - 100%)      CONSTITUTIONAL:  ill appearing  NAD    ENT:   Airway patent,   Mouth with normal mucosa.   No thrush      CARDIAC:   Normal rate,   Regular rhythm.    No edema      RESPIRATORY:   No wheezing  decreased bs right base  Not tachypneic,  No use of accessory muscles    GASTROINTESTINAL:  Abdomen soft,   Non-tender,   No guarding,     NEUROLOGICAL:   Alert and oriented   No motor deficits.    SKIN:   Skin normal color for race,   No evidence of rash.              LABS:                          9.1    17.65 )-----------( 172      ( 14 Feb 2021 04:30 )             29.2                                               02-14    133<L>  |  100  |  18  ----------------------------<  191<H>  3.4<L>   |  21  |  0.6<L>    Ca    7.1<L>      14 Feb 2021 04:30  Mg     1.8     02-14    TPro  3.8<L>  /  Alb  2.2<L>  /  TBili  0.4  /  DBili  x   /  AST  23  /  ALT  17  /  AlkPhos  69  02-14                                                 CARDIAC MARKERS ( 13 Feb 2021 16:18 )  x     / 0.07 ng/mL / x     / x     / x                                                LIVER FUNCTIONS - ( 14 Feb 2021 04:30 )  Alb: 2.2 g/dL / Pro: 3.8 g/dL / ALK PHOS: 69 U/L / ALT: 17 U/L / AST: 23 U/L / GGT: x                                                                                                                                       ct reviewed    MEDICATIONS  (STANDING):  albuterol/ipratropium for Nebulization 3 milliLiter(s) Nebulizer every 6 hours  budesonide 160 MICROgram(s)/formoterol 4.5 MICROgram(s) Inhaler 2 Puff(s) Inhalation two times a day  carvedilol 3.125 milliGRAM(s) Oral every 12 hours  cefepime   IVPB 1000 milliGRAM(s) IV Intermittent every 8 hours  dexAMETHasone     Tablet 1 milliGRAM(s) Oral two times a day  finasteride 5 milliGRAM(s) Oral daily  lacosamide 100 milliGRAM(s) Oral two times a day  levETIRAcetam  IVPB 750 milliGRAM(s) IV Intermittent every 12 hours  pantoprazole    Tablet 40 milliGRAM(s) Oral before breakfast  tamsulosin 0.4 milliGRAM(s) Oral at bedtime  tiotropium 18 MICROgram(s) Capsule 1 Capsule(s) Inhalation every 24 hours  vancomycin  IVPB 1000 milliGRAM(s) IV Intermittent every 12 hours    MEDICATIONS  (PRN):  ALBUTerol    90 MICROgram(s) HFA Inhaler 2 Puff(s) Inhalation every 6 hours PRN Shortness of Breath and/or Wheezing

## 2021-02-14 NOTE — PROCEDURE NOTE - NSPROCDETAILS_GEN_ALL_CORE
location identified, draped/prepped, sterile technique used, needle inserted/introduced/Seldinger technique/catheter inserted over needle/connection to syringe/connection to evacuated glass bottle

## 2021-02-14 NOTE — CHART NOTE - NSCHARTNOTEFT_GEN_A_CORE
Consult placed. Discussed with resident. They will call family tosay and let palliative medicine know if we need to discuss GOC over the weekend.

## 2021-02-14 NOTE — CONSULT NOTE ADULT - ASSESSMENT
80 year old male with hx of lung cancer (dx 2018) with mets to the brain s/p resection (2019) currently on chemo last session early January, HTN, BPH, Recent DVT in Jan 2021 not on AC because of GI bleeds, presents with shortness of breath of three days duration and is found to have  large Rt Pl effusion, lung mass and b/l subsegmental PE and he is on NRB at this time . He was also found to have new 0.4 cm lt temporal lesion on MRI on prior admission.     Patient was diagnosed with Stage 4 NSCLC in 2018 and has been on systemic therapy and follows with Dr Wasserman at [facility]. He is unsure of the current regimen and his last chemo dose was on Feb 5th. He was recently admitted to [facility] in Jan and was found to have LE DVT and was Dced on Eliquis. Pt was on it for 7 days and then started having GI bleed with melena and son stated that his counts fell. He discussed this with the Oncologist and his eliquis was stopped.       # Hypoxemic respiratory failure likely secondary to large right likely malignant pleural effusion in the setting of COPD complicated by subsegmental PE, rule out postobstructive pneumonia  # NSCLC stage 4 on systemic therapy with bone mets and new Lt temporal lesion   # New b/l subsegmental PE- not on AC due to recent GI bleed    Was seen by IR- no role for thrombectomy given such small amount of thrombus. If family is interested IR would do IVC filter   Not on AC due to recent GI bleed.   Planned for thoracentesis today   f/u LE doppler  Abx as per ID     Please get records from his primary Oncologist Dr Wasserman at [facility]   His prognosis is poor overall   Palliative care evaluation is appropriate     Info obtained from Beto Lantigua by using interpretor services

## 2021-02-14 NOTE — CHART NOTE - NSCHARTNOTEFT_GEN_A_CORE
I spoke to son named Son Rhina using the , ID: [id number]  I explained about the pt's diagnosis " multiple PE and right lung effusion". Explained that anticoagulation will not be started due to GI bleed. Son stated that Pt Rhina was refused colonoscopy and endoscopy outpatient by his gastroenterologist due to his resp status.  I explained to him the risks and benefits of the right thoracentesis that is planned today.  He verbalized and agreed for the procedure.  Consent form signed and placed in chart

## 2021-02-14 NOTE — CONSULT NOTE ADULT - ASSESSMENT
IMPRESSION:  Sepsis POA  possible post obstructive pna  HO lung ca with mets  low risk PE    PLAN:    CNS: avoid sedatives    HEENT: Oral care    PULMONARY:  HOB @ 45 degrees.  Aspiration precautions. thoracentesis today.wean o2 as tolerated. IR for GFF    CARDIOVASCULAR: Goal MAp >60    GI: GI prophylaxis.  Feeding.  Bowel regimen     RENAL:  Follow up lytes.  Correct as needed    INFECTIOUS DISEASE: Follow up cultures.abx per ID    HEMATOLOGICAL:  DVT prophylaxis.    ENDOCRINE:  Follow up FS.  Insulin protocol if needed.    MUSCULOSKELETAL: bedrest    Dispo: SDU  DNR/DNI    poor prognosis         IMPRESSION:    Sepsis POA  possible post obstructive pna  HO lung ca with mets  low risk PE  large r effusion    PLAN:    CNS: avoid sedatives    HEENT: Oral care    PULMONARY:  HOB @ 45 degrees.  Aspiration precautions. thoracentesis today.wean o2 as tolerated. IR for GFF if LE dopple r+    CARDIOVASCULAR: Goal MAp >60    GI: GI prophylaxis.  Feeding.  Bowel regimen     RENAL:  Follow up lytes.  Correct as needed    INFECTIOUS DISEASE: Follow up cultures. abx per ID    HEMATOLOGICAL:  DVT prophylaxis.    ENDOCRINE:  Follow up FS.  Insulin protocol if needed.    MUSCULOSKELETAL: bedrest    Dispo: SDU  DNR/DNI  palliative care eval  poor prognosis

## 2021-02-14 NOTE — CONSULT NOTE ADULT - SUBJECTIVE AND OBJECTIVE BOX
SHANNANEMANUEL  80y, Male  Allergy: No Known Allergies      All historical available data reviewed.    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  Discussed with son through , patient last saw his oncologist, Dr. Wasserman, on Tuesday and did not feel short of breath at the time.  Patient was having no other symptoms    In the ED he was satting 81% on room air which improved to 100% with nonrebreather.  He had a CTA done showing a large right pleural effusion and subsegmental PEs.  In the ED he received 1L LR and cefepime/vanc.    Triage vitals: /99    RR 30  Temp 97.6  SpO2 81% on room air (13 Feb 2021 23:03)    FAMILY HISTORY:    PAST MEDICAL & SURGICAL HISTORY:  AAA (abdominal aortic aneurysm)    History of BPH    Seizure    Lung cancer    S/P AAA repair    History of lung surgery          VITALS:  T(F): 97.8, Max: 100.3 (02-13-21 @ 15:49)  HR: 100  BP: 111/71  RR: 20Vital Signs Last 24 Hrs  T(C): 36.6 (14 Feb 2021 08:00), Max: 37.9 (13 Feb 2021 15:49)  T(F): 97.8 (14 Feb 2021 08:00), Max: 100.3 (13 Feb 2021 15:49)  HR: 100 (14 Feb 2021 08:00) (99 - 170)  BP: 111/71 (14 Feb 2021 08:00) (111/71 - 135/90)  BP(mean): 80 (14 Feb 2021 08:00) (80 - 90)  RR: 20 (14 Feb 2021 08:00) (20 - 30)  SpO2: 98% (14 Feb 2021 08:00) (81% - 100%)    TESTS & MEASUREMENTS:                        9.2    18.41 )-----------( 175      ( 13 Feb 2021 16:58 )             28.5     02-13    141  |  102  |  22<H>  ----------------------------<  155<H>  3.6   |  25  |  0.9    Ca    8.0<L>      13 Feb 2021 16:18  Mg     2.0     02-13    TPro  4.8<L>  /  Alb  2.4<L>  /  TBili  0.4  /  DBili  x   /  AST  24  /  ALT  20  /  AlkPhos  72  02-13    LIVER FUNCTIONS - ( 13 Feb 2021 16:18 )  Alb: 2.4 g/dL / Pro: 4.8 g/dL / ALK PHOS: 72 U/L / ALT: 20 U/L / AST: 24 U/L / GGT: x                   RADIOLOGY & ADDITIONAL TESTS:  Personal review of radiological diagnostics performed  Echo and EKG results noted when applicable.     MEDICATIONS:  ALBUTerol    90 MICROgram(s) HFA Inhaler 2 Puff(s) Inhalation every 6 hours PRN  albuterol/ipratropium for Nebulization 3 milliLiter(s) Nebulizer every 6 hours  budesonide 160 MICROgram(s)/formoterol 4.5 MICROgram(s) Inhaler 2 Puff(s) Inhalation two times a day  carvedilol 3.125 milliGRAM(s) Oral every 12 hours  cefepime   IVPB 1000 milliGRAM(s) IV Intermittent every 8 hours  dexAMETHasone     Tablet 1 milliGRAM(s) Oral two times a day  finasteride 5 milliGRAM(s) Oral daily  lacosamide 100 milliGRAM(s) Oral two times a day  levETIRAcetam  IVPB 750 milliGRAM(s) IV Intermittent every 12 hours  pantoprazole    Tablet 40 milliGRAM(s) Oral before breakfast  tamsulosin 0.4 milliGRAM(s) Oral at bedtime  tiotropium 18 MICROgram(s) Capsule 1 Capsule(s) Inhalation every 24 hours  vancomycin  IVPB 1000 milliGRAM(s) IV Intermittent every 12 hours      ANTIBIOTICS:  cefepime   IVPB 1000 milliGRAM(s) IV Intermittent every 8 hours  vancomycin  IVPB 1000 milliGRAM(s) IV Intermittent every 12 hours

## 2021-02-15 NOTE — PROGRESS NOTE ADULT - ASSESSMENT
IMPRESSION:    Sepsis POA  possible post obstructive pna  HO lung ca with mets  low risk PE no AC  large r effusion SP Thora    PLAN:    CNS: avoid sedatives    HEENT: Oral care    PULMONARY:  HOB @ 45 degrees.  Aspiration precautions. wean o2 as tolerated.   CARDIOVASCULAR: Goal MAp >60    GI: GI prophylaxis.  Feeding.  Bowel regimen     RENAL:  Follow up lytes.  Correct as needed    INFECTIOUS DISEASE: Follow up cultures. abx per ID    HEMATOLOGICAL:  DVT prophylaxis.    ENDOCRINE:  Follow up FS.  Insulin protocol if needed.    MUSCULOSKELETAL: bedrest    Dispo: SDU  DNR  palliative care eval  poor prognosis

## 2021-02-15 NOTE — DIETITIAN INITIAL EVALUATION ADULT. - REASON FOR ADMISSION
Pt p/w SOB for 3 days and found to have large Rt Pl effusion, lung mass and b/l subsegmental PE. Pt diagnosed with Stage 4 NSCLC in 2018 and has been on systemic therapy and follows with Dr Wasserman at [facility]. Pt started on Abx as per ID for possible post-obstructive bacterial pneumonia, underwent thoracentesis for R pleural effusion on 2/14 yielding 900 mL, IR did not recommend thrombectomy but are agreeable to IVC filter placement if family agrees. Patient is hemodynamically stable, with improvement in O2 saturation after receiving nebulizer/inhaler treatments sat'ing 94% on 5L NC.

## 2021-02-15 NOTE — DIETITIAN INITIAL EVALUATION ADULT. - OTHER CALCULATIONS
Dosing wt: 119#/54kg-->Calories: 7497-1589 kcal/day (MSJ x 1.2-1.3 AF); Protein: 54-65 gm/day (1-1.2 gm/kg CBW); Fluid: per VENT team

## 2021-02-15 NOTE — PROGRESS NOTE ADULT - SUBJECTIVE AND OBJECTIVE BOX
OVERNIGHT EVENTS: events noted, sp thora 900 cc out    Vital Signs Last 24 Hrs  T(C): 36.6 (15 Feb 2021 03:50), Max: 36.6 (14 Feb 2021 08:00)  T(F): 97.8 (15 Feb 2021 03:50), Max: 97.8 (14 Feb 2021 08:00)  HR: 109 (15 Feb 2021 03:50) (99 - 109)  BP: 115/77 (15 Feb 2021 03:50) (105/69 - 121/68)  BP(mean): 88 (14 Feb 2021 16:18) (80 - 88)  RR: 18 (15 Feb 2021 03:50) (18 - 21)  SpO2: 100% (14 Feb 2021 16:18) (98% - 100%)    PHYSICAL EXAMINATION:    GENERAL: ill looking    HEENT: Head is normocephalic and atraumatic    NECK: Supple.    LUNGS: dec bs both bases    HEART: ELLIOT 3/6    ABDOMEN: Soft, nontender, and nondistended.      EXTREMITIES: Without any cyanosis, clubbing, rash, lesions or edema.    NEUROLOGIC: Grossly intact.    SKIN: No ulceration or induration present.      LABS:                        9.1    17.65 )-----------( 172      ( 14 Feb 2021 04:30 )             29.2     02-14    141  |  105  |  17  ----------------------------<  98  3.6   |  22  |  0.6<L>    Ca    7.8<L>      14 Feb 2021 16:00  Mg     1.8     02-14    TPro  4.2<L>  /  Alb  2.0<L>  /  TBili  0.4  /  DBili  x   /  AST  25  /  ALT  16  /  AlkPhos  69  02-14          CARDIAC MARKERS ( 14 Feb 2021 11:00 )  x     / 0.05 ng/mL / 25 U/L / x     / 3.3 ng/mL  CARDIAC MARKERS ( 13 Feb 2021 16:18 )  x     / 0.07 ng/mL / x     / x     / x            Serum Pro-Brain Natriuretic Peptide: 1003 pg/mL (02-13-21 @ 16:18)            02-14-21 @ 07:01  -  02-15-21 @ 07:00  --------------------------------------------------------  IN: 400 mL / OUT: 0 mL / NET: 400 mL        MICROBIOLOGY:  Culture Results:   No growth to date. (02-13 @ 16:18)  Culture Results:   No growth to date. (02-13 @ 16:18)      MEDICATIONS  (STANDING):  albuterol/ipratropium for Nebulization 3 milliLiter(s) Nebulizer every 6 hours  budesonide 160 MICROgram(s)/formoterol 4.5 MICROgram(s) Inhaler 2 Puff(s) Inhalation two times a day  carvedilol 3.125 milliGRAM(s) Oral every 12 hours  dexAMETHasone     Tablet 1 milliGRAM(s) Oral two times a day  finasteride 5 milliGRAM(s) Oral daily  lacosamide 100 milliGRAM(s) Oral two times a day  levETIRAcetam  IVPB 750 milliGRAM(s) IV Intermittent every 12 hours  pantoprazole    Tablet 40 milliGRAM(s) Oral before breakfast  piperacillin/tazobactam IVPB.. 3.375 Gram(s) IV Intermittent every 6 hours  tamsulosin 0.4 milliGRAM(s) Oral at bedtime  tiotropium 18 MICROgram(s) Capsule 1 Capsule(s) Inhalation every 24 hours    MEDICATIONS  (PRN):  ALBUTerol    90 MICROgram(s) HFA Inhaler 2 Puff(s) Inhalation every 6 hours PRN Shortness of Breath and/or Wheezing      RADIOLOGY & ADDITIONAL STUDIES:

## 2021-02-15 NOTE — CHART NOTE - NSCHARTNOTEFT_GEN_A_CORE
MICU Course:   79 y/o M with stage 4 non-small cell lung cx with mets to brain s/p resection on chemo, hx of HTN, BPH, DVT not on AC d/t GI bleeds, presented due to SOB for 3 days.    In ED, pt was sat'ing 81% placed on NRB with improvement, CTA showed 5.3 & 1.1 cm mass in L lower and upper lobe respectively, large R pleural effusion, and subsegmental PEs.     Patient started on Abx as per ID for possible post-obstructive bacterial pneumonia, underwent thoracentesis for R pleural effusion on 2/14 yielding 900 mL, IR did not recommend thrombectomy but are agreeable to IVC filter placement if family agrees. Patient is hemodynamically stable, with improvement in O2 saturation after receiving nebulizer/inhaler treatments sat'ing 94% on 5L NC.     VITALS:  T(F): 97.2  HR: 104  BP: 102/68  RR: 20  SpO2: 94%      LABS                        10.2   18.27 )-----------( 188      ( 15 Feb 2021 08:11 )             32.7     02-15    143  |  107  |  20  ----------------------------<  105<H>  3.4<L>   |  23  |  0.7    Ca    8.2<L>      15 Feb 2021 08:11  Mg     1.8     02-14    TPro  4.2<L>  /  Alb  2.0<L>  /  TBili  0.4  /  DBili  x   /  AST  25  /  ALT  16  /  AlkPhos  69  02-14    Culture - Fungal, Body Fluid (collected 14 Feb 2021 13:29)  Source: .Body Fluid Pleural Fluid  Preliminary Report (15 Feb 2021 09:13):    Testing in progress    Culture - Body Fluid with Gram Stain (collected 14 Feb 2021 13:29)  Source: .Body Fluid Pleural Fluid  Gram Stain (14 Feb 2021 22:52):    polymorphonuclear leukocytes seen    No organisms seen    by cytocentrifuge      Culture - Blood (collected 13 Feb 2021 16:18)  Source: .Blood Blood  Preliminary Report (15 Feb 2021 01:02):    No growth to date.      CARDIAC MARKERS ( 14 Feb 2021 11:00 )  x     / 0.05 ng/mL / 25 U/L / x     / 3.3 ng/mL  CARDIAC MARKERS ( 13 Feb 2021 16:18 )  x     / 0.07 ng/mL / x     / x     / x          RADIOLOGY    LE Duplex 2/13:  Acute left soleal vein thrombus.  No evidence of acute deep vein thrombus or superficial thrombosis of the right lower extremity.    CT Angio Chest, 2/13  1.  Pulmonary emboli within the subsegmental branches of the bilateral upper lobes and left lower lobe.  2.  Left lower lobe 5.3 cm mass and additional smaller 1 cm mass in the left upper lobe. Findings are consistent with patient's known malignancy.  3.  Large right pleural effusion with adjacent compressive atelectasis.  4.  Multiple bony sclerotic lesions suspicious for metastatic disease.

## 2021-02-15 NOTE — CHART NOTE - NSCHARTNOTEFT_GEN_A_CORE
Consult received for patient for GOC.   Can wait until tomorrow, no symptom management needed right now.   Will be available in the event the patient declines today to call family with primary team.   Otherwise, will do full consult tomorrow.     X [phone] PRN.

## 2021-02-16 NOTE — PROGRESS NOTE ADULT - SUBJECTIVE AND OBJECTIVE BOX
EMANUEL CAMPBELL  80y, Male    All available historical data reviewed    OVERNIGHT EVENTS:    no fevers  feels well and has no complaints   ROS:  General: Denies rigors, nightsweats  HEENT: Denies headache, rhinorrhea, sore throat, eye pain  CV: Denies CP, palpitations  PULM: Denies wheezing, hemoptysis  GI: Denies hematemesis, hematochezia, melena  : Denies discharge, hematuria  MSK: Denies arthralgias, myalgias  SKIN: Denies rash, lesions  NEURO: Denies paresthesias, weakness  PSYCH: Denies depression, anxiety    VITALS:  T(F): 97, Max: 97.6 (02-15-21 @ 21:23)  HR: 109  BP: 105/61  RR: 22Vital Signs Last 24 Hrs  T(C): 36.1 (16 Feb 2021 00:14), Max: 36.4 (15 Feb 2021 21:23)  T(F): 97 (16 Feb 2021 00:14), Max: 97.6 (15 Feb 2021 21:23)  HR: 109 (16 Feb 2021 05:33) (105 - 109)  BP: 105/61 (16 Feb 2021 05:33) (105/61 - 115/79)  BP(mean): 87 (15 Feb 2021 21:23) (87 - 87)  RR: 22 (16 Feb 2021 05:33) (18 - 22)  SpO2: 92% (16 Feb 2021 12:33) (83% - 93%)    TESTS & MEASUREMENTS:                        9.2    17.38 )-----------( 183      ( 16 Feb 2021 08:49 )             29.3     02-16    138  |  102  |  16  ----------------------------<  126<H>  3.2<L>   |  25  |  0.7    Ca    7.7<L>      16 Feb 2021 08:49    TPro  4.2<L>  /  Alb  2.0<L>  /  TBili  0.4  /  DBili  x   /  AST  25  /  ALT  16  /  AlkPhos  69  02-14    LIVER FUNCTIONS - ( 14 Feb 2021 16:00 )  Alb: 2.0 g/dL / Pro: 4.2 g/dL / ALK PHOS: 69 U/L / ALT: 16 U/L / AST: 25 U/L / GGT: x             Culture - Fungal, Body Fluid (collected 02-14-21 @ 13:29)  Source: .Body Fluid Pleural Fluid  Preliminary Report (02-15-21 @ 09:13):    Testing in progress    Culture - Body Fluid with Gram Stain (collected 02-14-21 @ 13:29)  Source: .Body Fluid Pleural Fluid  Gram Stain (02-14-21 @ 22:52):    polymorphonuclear leukocytes seen    No organisms seen    by cytocentrifuge  Preliminary Report (02-15-21 @ 17:54):    No growth    Culture - Blood (collected 02-13-21 @ 16:18)  Source: .Blood Blood  Preliminary Report (02-15-21 @ 01:02):    No growth to date.    Culture - Blood (collected 02-13-21 @ 16:18)  Source: .Blood Blood  Preliminary Report (02-15-21 @ 01:02):    No growth to date.            RADIOLOGY & ADDITIONAL TESTS:  Personal review of radiological diagnostics performed  Echo and EKG results noted when applicable.     MEDICATIONS:  ALBUTerol    90 MICROgram(s) HFA Inhaler 2 Puff(s) Inhalation every 6 hours PRN  albuterol/ipratropium for Nebulization 3 milliLiter(s) Nebulizer every 6 hours  budesonide 160 MICROgram(s)/formoterol 4.5 MICROgram(s) Inhaler 2 Puff(s) Inhalation two times a day  carvedilol 3.125 milliGRAM(s) Oral every 12 hours  dexAMETHasone     Tablet 1 milliGRAM(s) Oral two times a day  finasteride 5 milliGRAM(s) Oral daily  lacosamide 100 milliGRAM(s) Oral two times a day  levETIRAcetam  IVPB 750 milliGRAM(s) IV Intermittent every 12 hours  pantoprazole    Tablet 40 milliGRAM(s) Oral before breakfast  piperacillin/tazobactam IVPB.. 3.375 Gram(s) IV Intermittent every 6 hours  tamsulosin 0.4 milliGRAM(s) Oral at bedtime  tiotropium 18 MICROgram(s) Capsule 1 Capsule(s) Inhalation every 24 hours      ANTIBIOTICS:  piperacillin/tazobactam IVPB.. 3.375 Gram(s) IV Intermittent every 6 hours

## 2021-02-16 NOTE — PROGRESS NOTE ADULT - ASSESSMENT
· Assessment	  80 year old male with hx of lung cancer (dx 2018) with mets to the brain s/p resection (2019) currently on chemo last session early January, HTN, BPH, DVT not on AC because of GI bleeds, presents with shortness of breath of three days duration      IMPRESSION;  Possible post obstructive bacterial PNA  Multiple PE  Lung cancer with mets to brain s/p resection, on CT. Bone mets   S/p thoracocentesis 2/14  Pleural fluid not consistent with empyema with NG cultures 2/14  BCx 2/13 NGTD  Eduardo REGALADO NG  CXR improved  COVID-19 NG    RECOMMENDATIONS;  Zosyn 3.375 gm iv q6h

## 2021-02-16 NOTE — GOALS OF CARE CONVERSATION - ADVANCED CARE PLANNING - CONVERSATION/DISCUSSION
Diagnosis/Prognosis/MOLST Discussed
Diagnosis/MOLST Discussed/Treatment Options/Palliative Care Referral

## 2021-02-16 NOTE — PROGRESS NOTE ADULT - ASSESSMENT
IMPRESSION:    Sepsis POA  possible post obstructive pna  HO lung ca with mets  low risk PE no AC  large r effusion SP Thora    PLAN:    CNS: avoid sedatives    HEENT: Oral care    PULMONARY:  HOB @ 45 degrees.  Aspiration precautions. wean o2 as tolerated.   CARDIOVASCULAR: Goal MAp >60    GI: GI prophylaxis.  Feeding.  Bowel regimen     RENAL:  Follow up lytes.  Correct as needed    INFECTIOUS DISEASE: Follow up cultures. abx per ID    HEMATOLOGICAL:  DVT prophylaxis.    ENDOCRINE:  Follow up FS.  Insulin protocol if needed.    MUSCULOSKELETAL: bedrest    Dispo: SDU  DNR  palliative care eval  poor prognosis  I

## 2021-02-16 NOTE — CONSULT NOTE ADULT - SUBJECTIVE AND OBJECTIVE BOX
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  In the ED he was saturating 81% on room air which improved to 100% with nonrebreather.  He had a CTA done showing a large right pleural effusion and subsegmental PEs.    Patient was diagnosed with Stage 4 NSCLC in 2018 and has been on systemic therapy and follows with Dr Wasserman at [facility]. He is unsure of the current regimen and his last chemo dose was on Feb 5th. He was recently admitted to [facility] in Jan and was found to have LE DVT and was Dced on Eliquis. Pt was on it for 7 days and then started having GI bleed with melena and son stated that his counts fell. He discussed this with the Oncologist and his eliquis was stopped.   He is now found to have large Rt Pl effusion, lung mass and b/l subsegmental PE. Palliative care is consulted for further discussion of goals of care.       PERTINENT PMH REVIEWED:  [X ] YES [ ] NO    SOCIAL HISTORY:     Children:  [X ] YES  [ ] NO       Illicit drug use:  [ ] YES  [ ] NO      Tobacco hx: Former smoker . Not active   Alcohol hx:  [ ] YES  [ X] NO      Home Opioid hx:  [ ] YES  [X ] NO   Living Situation:  [ X] Home  [ ] Long-term care  [ ] Rehab      BASELINE ADLs (prior to admission): moderate    ADVANCE DIRECTIVES:  [ X] YES  [ ] NO   DNR:  [X ] YES  [ ] NO  MOLST:  [X ] YES  [ ] NO  Living Will:  [ ] YES  [ X] NO  Health Care Proxy:  [X ] YES  [ ] NO    [ ] Surrogate  [X ] HCP  [ ] Guardian            Name: Son: Ortiz Lantigua             Phone #: [phone]    Allergies    No Known Allergies    Intolerances        MEDICATIONS  (STANDING):  albuterol/ipratropium for Nebulization 3 milliLiter(s) Nebulizer every 6 hours  budesonide 160 MICROgram(s)/formoterol 4.5 MICROgram(s) Inhaler 2 Puff(s) Inhalation two times a day  carvedilol 3.125 milliGRAM(s) Oral every 12 hours  dexAMETHasone     Tablet 1 milliGRAM(s) Oral two times a day  finasteride 5 milliGRAM(s) Oral daily  lacosamide 100 milliGRAM(s) Oral two times a day  levETIRAcetam  IVPB 750 milliGRAM(s) IV Intermittent every 12 hours  pantoprazole    Tablet 40 milliGRAM(s) Oral before breakfast  piperacillin/tazobactam IVPB.. 3.375 Gram(s) IV Intermittent every 6 hours  tamsulosin 0.4 milliGRAM(s) Oral at bedtime  tiotropium 18 MICROgram(s) Capsule 1 Capsule(s) Inhalation every 24 hours    MEDICATIONS  (PRN):  ALBUTerol    90 MICROgram(s) HFA Inhaler 2 Puff(s) Inhalation every 6 hours PRN Shortness of Breath and/or Wheezing      PRESENT SYMPTOMS:  Pain:  [ ] YES  [ X] NO  Dyspnea:  [X ] YES  [ ] NO   Anxiety:  [ ] YES  [X ] NO  Fatigue:  [ X] YES  [ ] NO   Nausea:  [ ] YES  [X ] NO  Constipation:  [ ] YES  [X ] NO       Vital Signs Last 24 Hrs  T(C): 36.1 (16 Feb 2021 00:14), Max: 36.4 (15 Feb 2021 21:23)  T(F): 97 (16 Feb 2021 00:14), Max: 97.6 (15 Feb 2021 21:23)  HR: 109 (16 Feb 2021 05:33) (103 - 109)  BP: 105/61 (16 Feb 2021 05:33) (105/61 - 115/79)  BP(mean): 87 (15 Feb 2021 21:23) (84 - 87)  RR: 22 (16 Feb 2021 05:33) (18 - 22)  SpO2: 92% (16 Feb 2021 12:33) (83% - 93%)      PHYSICAL EXAM  General: Sick looking +. On NC at 6 liters O2 . Was sleeping during the exam today   CV: normal S1/S2   Lungs: Decreased BS b/l Rt >Lt  Abdomen: soft non-tender  Ext: no edema  Neuro: Was sleeping during exam. Responds to simple commands       LABS:                        9.2    17.38 )-----------( 183      ( 16 Feb 2021 08:49 )             29.3     02-16    138  |  102  |  16  ----------------------------<  126<H>  3.2<L>   |  25  |  0.7    Ca    7.7<L>      16 Feb 2021 08:49    TPro  4.2<L>  /  Alb  2.0<L>  /  TBili  0.4  /  DBili  x   /  AST  25  /  ALT  16  /  AlkPhos  69  02-14        I&O's Summary    15 Feb 2021 07:01  -  16 Feb 2021 07:00  --------------------------------------------------------  IN: 840 mL / OUT: 0 mL / NET: 840 mL    < from: CT Angio Chest PE Protocol w/ IV Cont (02.13.21 @ 18:31) >  IMPRESSION:  1.  Pulmonary emboli within the subsegmental branches of the bilateral upper lobes and left lower lobe.  2.  Left lower lobe 5.3 cm mass and additional smaller 1 cm mass in the left upper lobe. Findings are consistent with patient's known malignancy.  3.  Large right pleural effusion with adjacent compressive atelectasis.  4.  Multiple bony sclerotic lesions suspicious for metastatic disease.      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  Discussed with son through , patient last saw his oncologist, Dr. Wasserman, on Tuesday and did not feel short of breath at the time.  Patient was having no other symptoms    In the ED he was satting 81% on room air which improved to 100% with nonrebreather.  He had a CTA done showing a large right pleural effusion and subsegmental PEs.  In the ED he received 1L LR and cefepime/vanc.    Triage vitals: /99    RR 30  Temp 97.6  SpO2 81% on room air (13 Feb 2021 23:03)        PAST MEDICAL & SURGICAL HISTORY:  AAA (abdominal aortic aneurysm)    History of BPH    Seizure    Lung cancer    S/P AAA repair    History of lung surgery        Subjective and Objective:  Today,  Discussed with  resident and son     Focused Palliative Care Evaluation: Patient seen and examined at bedside. Patient not able to participate with assessment.                   Symptoms:                                      Pain                                     Dyspnea                                     N/V                                     Appetite                                     Anxiety                                     Other _____________________                     Support Devices:              PHYSICAL EXAM:    Constitutional: ill appearing   General: Sick looking +. On NC at 6 liters O2 . Was sleeping during the exam today   HEENT: atraumatic, normocephalic  EYES: closed   CV: normal S1/S2   Lungs: Decreased BS b/l Rt >Lt  Abdomen: soft non-tender  Ext: no edema  Neuro: Was sleeping during exam. Responds to simple commands       T(C): 36.1, Max: 36.4 (21:23)  HR: 109 (105 - 109)  BP: 105/61 (105/61 - 115/79)  RR: 22 (18 - 22)  SpO2: 92% (83% - 93%)      LABS/STUDIES:  02-16    138  |  102  |  16  ----------------------------<  126<H>  3.2<L>   |  25  |  0.7    Ca    7.7<L>      16 Feb 2021 08:49    TPro  4.2<L>  /  Alb  2.0<L>  /  TBili  0.4  /  DBili  x   /  AST  25  /  ALT  16  /  AlkPhos  69  02-14                            9.2    17.38 )-----------( 183      ( 16 Feb 2021 08:49 )             29.3       MEDICATIONS  (STANDING):  albuterol/ipratropium for Nebulization 3 milliLiter(s) Nebulizer every 6 hours  budesonide 160 MICROgram(s)/formoterol 4.5 MICROgram(s) Inhaler 2 Puff(s) Inhalation two times a day  carvedilol 3.125 milliGRAM(s) Oral every 12 hours  dexAMETHasone     Tablet 1 milliGRAM(s) Oral two times a day  finasteride 5 milliGRAM(s) Oral daily  lacosamide 100 milliGRAM(s) Oral two times a day  levETIRAcetam  IVPB 750 milliGRAM(s) IV Intermittent every 12 hours  pantoprazole    Tablet 40 milliGRAM(s) Oral before breakfast  piperacillin/tazobactam IVPB.. 3.375 Gram(s) IV Intermittent every 6 hours  tamsulosin 0.4 milliGRAM(s) Oral at bedtime  tiotropium 18 MICROgram(s) Capsule 1 Capsule(s) Inhalation every 24 hours    MEDICATIONS  (PRN):  ALBUTerol    90 MICROgram(s) HFA Inhaler 2 Puff(s) Inhalation every 6 hours PRN Shortness of Breath and/or Wheezing    < from: CT Angio Chest PE Protocol w/ IV Cont (02.13.21 @ 18:31) >  IMPRESSION:  1.  Pulmonary emboli within the subsegmental branches of the bilateral upper lobes and left lower lobe.  2.  Left lower lobe 5.3 cm mass and additional smaller 1 cm mass in the left upper lobe. Findings are consistent with patient's known malignancy.  3.  Large right pleural effusion with adjacent compressive atelectasis.  4.  Multiple bony sclerotic lesions suspicious for metastatic disease.      iStop: Rx Written Rx Dispensed Drug Quantity Days Supply Prescriber Name Payment Method Dispenser 01/24/2021 01/25/2021 vimpat 100 mg tablet 60 30 [facility] Medicare [facility]      PPS  Level    ___20_______       Note PPS = Palliative Performance Scale; (c)2001, [employer]       Range from 100% meaning Full ambulation/self-care/intake/Level of Consciousness                                                                              to        10% meaning Bedbound/Unable to do any activity/extensive disease /Total Care/ No PO intake/ LOC=Full/drowsy/+/-confusion        (0% = death)                     Prior to acute illness, patient's functionality reportedly low

## 2021-02-16 NOTE — GOALS OF CARE CONVERSATION - ADVANCED CARE PLANNING - WHAT MATTERS MOST
That comfort be maximized but all any potentially reversible conditions be treated to prolong life, with definite limitations of DNR/I. That comfort be maximized but any potentially reversible conditions be treated to prolong life, with limitations of DNR/I.

## 2021-02-16 NOTE — PROGRESS NOTE ADULT - SUBJECTIVE AND OBJECTIVE BOX
OVERNIGHT EVENTS: events noted, on NC, fluid reviewed    Vital Signs Last 24 Hrs  T(C): 36.1 (16 Feb 2021 00:14), Max: 36.4 (15 Feb 2021 21:23)  T(F): 97 (16 Feb 2021 00:14), Max: 97.6 (15 Feb 2021 21:23)  HR: 109 (16 Feb 2021 05:33) (103 - 109)  BP: 105/61 (16 Feb 2021 05:33) (102/68 - 115/79)  BP(mean): 87 (15 Feb 2021 21:23) (79 - 87)  RR: 22 (16 Feb 2021 05:33) (18 - 22)  SpO2: 91% (16 Feb 2021 05:33) (83% - 94%)    PHYSICAL EXAMINATION:    GENERAL: ill looking    HEENT: Head is normocephalic and atraumatic.     NECK: Supple.    LUNGS: dec bs both bases    HEART: ELLIOT 3/6    ABDOMEN: Soft, nontender, and nondistended.      EXTREMITIES: Without any cyanosis, clubbing, rash, lesions or edema.    NEUROLOGIC: Grossly intact.    SKIN: No ulceration or induration present.      LABS:                        9.2    17.38 )-----------( 183      ( 16 Feb 2021 08:49 )             29.3     02-16    138  |  102  |  16  ----------------------------<  126<H>  3.2<L>   |  25  |  0.7    Ca    7.7<L>      16 Feb 2021 08:49    TPro  4.2<L>  /  Alb  2.0<L>  /  TBili  0.4  /  DBili  x   /  AST  25  /  ALT  16  /  AlkPhos  69  02-14          CARDIAC MARKERS ( 14 Feb 2021 11:00 )  x     / 0.05 ng/mL / 25 U/L / x     / 3.3 ng/mL        Serum Pro-Brain Natriuretic Peptide: 1003 pg/mL (02-13-21 @ 16:18)            02-15-21 @ 07:01  -  02-16-21 @ 07:00  --------------------------------------------------------  IN: 840 mL / OUT: 0 mL / NET: 840 mL        MICROBIOLOGY:  Culture Results:   No growth (02-14 @ 13:29)  Culture Results:   Testing in progress (02-14 @ 13:29)  Culture Results:   No growth to date. (02-13 @ 16:18)  Culture Results:   No growth to date. (02-13 @ 16:18)      MEDICATIONS  (STANDING):  albuterol/ipratropium for Nebulization 3 milliLiter(s) Nebulizer every 6 hours  budesonide 160 MICROgram(s)/formoterol 4.5 MICROgram(s) Inhaler 2 Puff(s) Inhalation two times a day  carvedilol 3.125 milliGRAM(s) Oral every 12 hours  dexAMETHasone     Tablet 1 milliGRAM(s) Oral two times a day  finasteride 5 milliGRAM(s) Oral daily  lacosamide 100 milliGRAM(s) Oral two times a day  levETIRAcetam  IVPB 750 milliGRAM(s) IV Intermittent every 12 hours  pantoprazole    Tablet 40 milliGRAM(s) Oral before breakfast  piperacillin/tazobactam IVPB.. 3.375 Gram(s) IV Intermittent every 6 hours  tamsulosin 0.4 milliGRAM(s) Oral at bedtime  tiotropium 18 MICROgram(s) Capsule 1 Capsule(s) Inhalation every 24 hours    MEDICATIONS  (PRN):  ALBUTerol    90 MICROgram(s) HFA Inhaler 2 Puff(s) Inhalation every 6 hours PRN Shortness of Breath and/or Wheezing      RADIOLOGY & ADDITIONAL STUDIES:

## 2021-02-16 NOTE — CONSULT NOTE ADULT - ASSESSMENT
80 year old male with hx of Stage 4 NSCLC in 2018 and has been on systemic therapy and follows with Dr Wasserman at [facility] with h/o of mets  to the brain s/p resection (2019) currently on chemo- Last dose on Feb 5th is here for for SOB and was found to have  large Rt Pl effusion, lung mass and b/l subsegmental PE. He was also found to have new 0.4 cm lt temporal lesion on MRI on prior admission and is on decadron.     Palliative care was consulted for discussion of GOC.         # Hypoxemic respiratory failure likely secondary to large right likely malignant pleural effusion in the setting of COPD complicated by subsegmental PE, rule out postobstructive pneumonia  # NSCLC stage 4 on systemic therapy with bone mets and new Lt temporal lesion   # New b/l subsegmental PE- not on AC due to recent GI bleed  # Possible Asp PNA    Interpretor services used ID: [id number] to communicate with son- Ortiz Lantigua over the phone who is the HCP.   We discussed the clinical course and answered all his questions and discussed overall poor prognosis.     Given that pt is not on AC 2/2 to GI bleed we discussed about the option of IVC filter   and son would like that and would like to discuss with IR about risks and benefits.     Regarding his GOC- He would like to keep his dad comfortable and avoid aggressive measures but would like to continue with current ongoing medical care at this time.  He would like change the code status to DNR/DNI- New MOLST form updated.     Symptom management: Pt appears comfortable. Not in pain/ anxious. C/w O2 NC.     Will Follow  Call [phone] for any concerns.    Discussed with medical resident.    80 year old male with hx of Stage 4 NSCLC in 2018 and has been on systemic therapy and follows with Dr Wasserman at [facility] with h/o of mets  to the brain s/p resection (2019) currently on chemo- Last dose on Feb 5th is here for for SOB and was found to have  large Rt Pl effusion, lung mass and b/l subsegmental PE. He was also found to have new 0.4 cm lt temporal lesion on MRI on prior admission and is on decadron.     Palliative care was consulted for discussion of GOC.     # Hypoxemic respiratory failure likely secondary to large right likely malignant pleural effusion in the setting of COPD complicated by subsegmental PE, rule out postobstructive pneumonia  # NSCLC stage 4 on systemic therapy with bone mets and new Lt temporal lesion   # New b/l subsegmental PE- not on AC due to recent GI bleed  # Possible Asp PNA    Interpretor services used ID: [id number] to communicate with son- Ortiz Lantigua over the phone who is the HCP. We discussed the clinical course and answered all his questions and discussed overall poor prognosis.  Given that pt is not on AC 2/2 to GI bleed we discussed about the option of IVC filter and son would like that and would like to discuss with IR about risks and benefits. Regarding his GOC- He would like to keep his dad comfortable and avoid aggressive measures but would like to continue with current ongoing medical care at this time. He would like change the code status to DNR/DNI- New MOLST form updated.     Time spent discussing ACP: 30 mins   Please see separate GOC note for more details    Symptom management: Pt appears comfortable. Not in pain/ anxious. C/w O2 NC.     Recommendations:  Continue current management  DNR/DNI   Will Follow    Call [phone] for any concerns.

## 2021-02-16 NOTE — CONSULT NOTE ADULT - ATTENDING COMMENTS
81 yo make with metastatic lung cancer, brain mets was found to have DVT in hid leg and given Eliquis which was discontinued subsequently due to GI bleeding. He presented with increased SOB, hypoxemia and was found to have large right pleural effusion and  b/l subsegmental PE.  Prognosis is very poor. Recommend palliative care.  Pulmonary consult for possible thoracentesis if he can tolerate procedure.   Recent GI bleeding, can not take AC. Vascular consult for IVC filter.
80 year old male with hx of Stage 4 NSCLC in 2018 and has been on systemic therapy and follows with Dr Wasserman at [facility] [place] with h/o of mets  to the brain s/p resection (2019) currently on chemo- Last dose on Feb 5th is here for for SOB and was found to have  large Rt Pl effusion, lung mass and b/l subsegmental PE. He was also found to have new 0.4 cm lt temporal lesion on MRI on prior admission and is on decadron. Patient has resp failure due to lung cancer, pleural effusion and PE. Discussion with son as above. Code status is DNR /DNI.  Continue medical management. Palliative to continue following.

## 2021-02-16 NOTE — GOALS OF CARE CONVERSATION - ADVANCED CARE PLANNING - CONVERSATION DETAILS
Discussed with patient's son through  the patient's prognosis.  As his father is relatively stable right now he wants to wait to make a decision on whether he would want intubation if needed.  Regarding resuscitation his son wants him to be DNR, stating "it would just prolong his suffering".
Palliative Care spoke with pt.' s son/surrogate Ortiz Lantigua using Cantonese .  Son states he provided HCP naming him as pt.'s health care agent to vent unit staff, however, nothing on chart; son, is however pt.'s legal surrogate.  Palliative Care introduced to son, and pt.'s overall medical and clinical condition reviewed.  All questions answered in detail.  Prior to palliative care consult son had  made pt. a DNR.  Reviewed advanced directives and at this time son wishes also for DNI, indicating his father would not want be placed on mechanical ventilation.  Son does wish to continue all other medical management and pursue other interventions, including IVC filter placement.  Introduced idea of full comfort care should pt. further decline.  For now son will continue with medical care as indicated above with DNR/I.  Updated MOLST completed.  Provided son with palliative care contact information [phone].  Support provided.  Ongoing GOC conversations as appropriate.

## 2021-02-17 NOTE — PROGRESS NOTE ADULT - ASSESSMENT
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      # Acute Hypoxic respiratory failure  # Sepsis POA  # possible post obstructivepneumonia  # H/o lung cancer with mets to the brain s/p resection (2019) currently on chemo last session early January  #Pulmonary embolism  # Right Pleural effusion-S/p thoracentesis  on 2/14 yielding 900 mL  -  CT Angio Chest PE Protocol w/ IV Cont (02.13.21 @ 18:31) >Pulmonary emboli within the subsegmental branches of the bilateral upper lobes and left lower lobe. Left lower lobe 5.3 cm mass and additional smaller 1 cm mass in the left upper lobe. Findings are consistent with patient's known malignancy. Large right pleural effusion with adjacent compressive atelectasis. Multiple bony sclerotic lesions suspicious for metastatic disease.  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      # Acute Hypoxic respiratory failure  # Sepsis POA  # possible post obstructive bacterial pneumonia  # NSCLC stage 4 with  bone mets and new Left temporal lesion   #Pulmonary embolism  # Right Pleural effusion-S/p thoracentesis  on 2/14 yielding 900 mL  -  CT Angio Chest PE Protocol w/ IV Cont (02.13.21 @ 18:31) >Pulmonary emboli within the subsegmental branches of the bilateral upper lobes and left lower lobe. Left lower lobe 5.3 cm mass and additional smaller 1 cm mass in the left upper lobe. Findings are consistent with patient's known malignancy. Large right pleural effusion with adjacent compressive atelectasis. Multiple bony sclerotic lesions suspicious for metastatic disease.  -  VA Duplex Lower Ext Vein Scan, Bilat (02.13.21 @ 23:25) >Acute left soleal vein thrombus.No evidence of acute deep vein thrombus or superficial thrombosis of the right lower extremity.  -  MR Head w/wo IV Cont (01.18.21 @ 14:50) >Abnormal signal in the left frontal lobe and extra-axial fluid collection beneath the craniotomy site overall appear smaller likely representing resolving postoperative changes. New small (0.4 cm) enhancing lesion in the left temporal lobe, likely representing metastatic disease in view of the patient's stated clinical history of metastatic.  - blood cultures neg  - MRSA nares -neg  - COVID 19 neg  - c/w zosyn  - c/w duoneb, budesonide, spiriva  - maintain oxygen sat> 92  - not on AC due to recent GI bleed. Was seen by IR- no role for thrombectomy.   - S/p IVC filter today on 2/17  - C/w lacosamide, keppra. c/w seizure precautions  - c/w decadron    # Hypokalemia  - replete k    # H/o GI bleed  - c/w protonix    # Hypertension  - c/w coreg    # H/o BPH  - c/w flomax, finasteride    # DNR/DNI  - palliative carec following    #Poor prognosis    # Pending: clinical improvement, Pulmonary F/u  # Disposition: TBD

## 2021-02-17 NOTE — PROGRESS NOTE ADULT - SUBJECTIVE AND OBJECTIVE BOX
INTERVENTIONAL RADIOLOGY BRIEF-OPERATIVE NOTE    Procedure: Image guided IVC filter placement     Pre-Op Diagnosis: PE, LLE DVT. Unable to get systemic AC.    Post-Op Diagnosis: Same    Attending: Efren  Resident: Sandra    Anesthesia (type):  [ ] General Anesthesia  [ ] Sedation  [ ] Spinal Anesthesia  [x] Local/Regional    Contrast: 20cc    Estimated Blood Loss: 5cc    Condition:   [ ] Critical  [x] Serious  [ ] Fair   [ ] Good    Findings/Follow up Plan of Care: Initial venogram demonstrated normal caliber IVC without thrombus. s/p placement of IVC filter, position confirmed with fluoro.     Specimens Removed: none    Implants: Argon option elite IVC filter    Complications: none    Disposition:  Recovery then floor. Right leg strait for 4 hours.       Please call Interventional Radiology [phone] with any questions, concerns, or issues.

## 2021-02-17 NOTE — PROGRESS NOTE ADULT - ASSESSMENT
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 Bone mets   S/p thoracocentesis 2/14  Pleural fluid not consistent with empyema with NG cultures 2/14  BCx 2/13 NGTD  Eduardo REGALADO NG  CXR improved  COVID-19 NG    RECOMMENDATIONS;  Zosyn 3.375 gm iv q6h

## 2021-02-18 NOTE — PROGRESS NOTE ADULT - ASSESSMENT
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      # Acute Hypoxic respiratory failure  # Sepsis POA  # possible post obstructive bacterial pneumonia  # NSCLC stage 4 with  bone mets and new Left temporal lesion   #Pulmonary embolism  # Right Pleural effusion-S/p thoracentesis  on 2/14 yielding 900 mL  -  CT Angio Chest PE Protocol w/ IV Cont (02.13.21 @ 18:31) >Pulmonary emboli within the subsegmental branches of the bilateral upper lobes and left lower lobe. Left lower lobe 5.3 cm mass and additional smaller 1 cm mass in the left upper lobe. Findings are consistent with patient's known malignancy. Large right pleural effusion with adjacent compressive atelectasis. Multiple bony sclerotic lesions suspicious for metastatic disease.  -  VA Duplex Lower Ext Vein Scan, Bilat (02.13.21 @ 23:25) >Acute left soleal vein thrombus.No evidence of acute deep vein thrombus or superficial thrombosis of the right lower extremity.  -  MR Head w/wo IV Cont (01.18.21 @ 14:50) >Abnormal signal in the left frontal lobe and extra-axial fluid collection beneath the craniotomy site overall appear smaller likely representing resolving postoperative changes. New small (0.4 cm) enhancing lesion in the left temporal lobe, likely representing metastatic disease in view of the patient's stated clinical history of metastatic.  - blood cultures neg  - MRSA nares -neg  - COVID 19 neg  - c/w zosyn  - c/w duoneb, budesonide, spiriva  - maintain oxygen sat> 92  - not on AC due to recent GI bleed. Was seen by IR- no role for thrombectomy.   - S/p IVC filter today on 2/17  - C/w lacosamide, keppra. c/w seizure precautions  - c/w decadron    # Hypokalemia  - repleted with  k    # H/o GI bleed  - c/w protonix    # Hypertension  - c/w coreg    # H/o BPH  - c/w flomax, finasteride    # DNR/DNI  - palliative care following    #Poor prognosis    # Pending: clinical improvement,  oncology F/u  # Disposition: TBD

## 2021-02-18 NOTE — CHART NOTE - NSCHARTNOTEFT_GEN_A_CORE
Registered Dietitian Follow-Up     Patient Profile Reviewed                           Yes [x]   No []     Nutrition History Previously Obtained        Yes []  No [x] obtained hx from daughter in law and son both on phone together (#[phone]) -- reported pt ate well at home. had 4 meals a day. likes/prefers more asian foods, like noodles. NKFA. takes multivitamins and Vit D, C. no cul/rel or personal food rest/prefs. UBW: 53.1kg -- vs. wt at admit: 54kg  -- no wt loss reported per family, say that pt has been stable at this wt for quite some time. noted pt w/ edema, however family doesn't recall any wt changes.     Pertinent Subjective Information: family says, RN called yesterday, and at admit, pt isn't eating that well. Spoke w/ PCA -- was not too sure of pt's po/appetite at this time. EMR po documentation: 2/15: 0-25%. po likely remains poor. discussed addition of nutritional supplements -- willing to add as appropriate.  prev Rd recommended diet order not activated -- will discuss w/ team.     Pertinent Medical Interventions:  # Acute Hypoxic respiratory failure  # Sepsis POA  # possible post obstructive bacterial pneumonia  # NSCLC stage 4 with  bone mets and new Left temporal lesion   # Hypokalemia- repleted with  k  #Son would like to c/w ongoing medical management -- per palliative note     Diet order: Diet, Soft:   Diet, Soft (02-13-21 @ 23:03) [Active]    Anthropometrics:  Height (cm): 170.2 (02-17-21 @ 08:07)  Weight (kg): 54 (02-17-21 @ 08:07) -- no new wts in EMR  BMI (kg/m2): 18.6 (02-17-21 @ 08:07)  IBW: 61.3kg     MEDICATIONS  (STANDING):  albuterol/ipratropium for Nebulization 3 milliLiter(s) Nebulizer every 6 hours  budesonide 160 MICROgram(s)/formoterol 4.5 MICROgram(s) Inhaler 2 Puff(s) Inhalation two times a day  carvedilol 3.125 milliGRAM(s) Oral every 12 hours  dexAMETHasone     Tablet 1 milliGRAM(s) Oral two times a day  finasteride 5 milliGRAM(s) Oral daily  lacosamide 100 milliGRAM(s) Oral two times a day  levETIRAcetam  IVPB 750 milliGRAM(s) IV Intermittent every 12 hours  pantoprazole    Tablet 40 milliGRAM(s) Oral before breakfast  piperacillin/tazobactam IVPB.. 3.375 Gram(s) IV Intermittent every 6 hours  tiotropium 18 MICROgram(s) Capsule 1 Capsule(s) Inhalation every 24 hours    MEDICATIONS  (PRN):  ALBUTerol    90 MICROgram(s) HFA Inhaler 2 Puff(s) Inhalation every 6 hours PRN Shortness of Breath and/or Wheezing    Pertinent Labs: (2/17) H/H: 10.2/32.5, K: 3.3, Cr: 0.5, Glucose: 148, Ca: 8.1    Physical Findings:  - Appearance: confused/disoriented to situation; 2+ L ankle  edema noted  - GI function: fecal incontinence, LBM 2/15 per Rn flowsheets  - Tubes: n/a   - Oral/Mouth cavity: no chewing/swallowing difficulty reported per family   - Skin: bruised/ecchymotic, redness blanchable; Pt is post IVC filter, bandaid c/d/i to R groin per RN flowsheets      Nutrition Requirements: adjusted d/t active lung ca w/ mass + recent treatment + age considered + BMI <19  Weight Used: Dosing wt: 119#/54kg     Estimated Energy Needs    Continue []  Adjust [x]   3965-5228 kcal/day (MSJ x 1.2-1.4 AF)      Estimated Protein Needs    Continue []  Adjust [x]  54-70 gm/day (1-1.3 gm/kg CBW)     Estimated Fluid Needs        Continue [x]  Adjust []  1mL/kcal or LIP      Nutrient Intake: 0-25% per EMR      [] Previous Nutrition Diagnosis:  Inadequate Oral Intake.             [x] Ongoing          [] Resolved    Nutrition Intervention: meals and snacks,medical food supplements      Goal/Expected Outcome: pt to consume/tolerate ~/>75% of meals/snacks and po supplements in 3 days.     Indicator/Monitoring: diet order,energy intake,body composition,NFPF, electrolyte profile     Recommendation:  1. order Ensure Enlive BID   2. Obtain new wts  3. Continue w/ current diet order   4. Change diet to [facility] soft, Gi soft diet order likely error    [phone]  pt @ risk, RD to f/u in 4 days  RD remains available Aliya Page [phone]

## 2021-02-18 NOTE — PROGRESS NOTE ADULT - ATTENDING COMMENTS
Pt seen, agree w above Fellow assessment.  Pt significantly deconditioned, lethargic with minimal po intake.   Onc team discussing with pt's primary Oncologist utility of further cancer targeted treatment.  If no further treatment deemed appropriate given pt's clinical decline, plan to discuss Hospice option with son.  No current symptoms to address.  Cont conservative (noninvasive) med mngmnt w DNR/DNI status in place    We will follow  [phone]

## 2021-02-18 NOTE — PROGRESS NOTE ADULT - SUBJECTIVE AND OBJECTIVE BOX
Patient is a 80y old  Male who presents with a chief complaint of shortness of breath (17 Feb 2021 10:12)    Patient was seen and examined.  S/p IVC filter placement on 1/17/21  no new complaints    PAST MEDICAL & SURGICAL HISTORY:  AAA (abdominal aortic aneurysm)  History of BPH  Seizure  Lung cancer  S/P AAA repair  History of lung surgery    Allergies  No Known Allergies    Home Medications:  carvedilol 3.125 mg oral tablet: 1 tab(s) orally once a day (13 Feb 2021 22:55)  finasteride 5 mg oral tablet: 1 tab(s) orally once a day (13 Feb 2021 22:55)  pantoprazole 20 mg oral delayed release tablet: 1 tab(s) orally once a day (13 Feb 2021 22:55)  tamsulosin 0.4 mg oral capsule: 1 cap(s) orally once a day (13 Feb 2021 22:55)    MEDICATIONS  (STANDING):  albuterol/ipratropium for Nebulization 3 milliLiter(s) Nebulizer every 6 hours  budesonide 160 MICROgram(s)/formoterol 4.5 MICROgram(s) Inhaler 2 Puff(s) Inhalation two times a day  carvedilol 3.125 milliGRAM(s) Oral every 12 hours  dexAMETHasone     Tablet 1 milliGRAM(s) Oral two times a day  finasteride 5 milliGRAM(s) Oral daily  lacosamide 100 milliGRAM(s) Oral two times a day  levETIRAcetam  IVPB 750 milliGRAM(s) IV Intermittent every 12 hours  pantoprazole    Tablet 40 milliGRAM(s) Oral before breakfast  piperacillin/tazobactam IVPB.. 3.375 Gram(s) IV Intermittent every 6 hours  tamsulosin 0.4 milliGRAM(s) Oral at bedtime  tiotropium 18 MICROgram(s) Capsule 1 Capsule(s) Inhalation every 24 hours    MEDICATIONS  (PRN):  ALBUTerol    90 MICROgram(s) HFA Inhaler 2 Puff(s) Inhalation every 6 hours PRN Shortness of Breath and/or Wheezing    T(C): 35.9 (02-18-21 @ 05:42), Max: 36.2 (02-17-21 @ 14:15)  HR: 100 (02-18-21 @ 09:00) (100 - 133)  BP: 100/66 (02-18-21 @ 05:42) (100/66 - 111/83)  RR: 20 (02-18-21 @ 09:00) (20 - 22)  SpO2: 95% (02-18-21 @ 09:00) (93% - 96%)    O/E:  Awake, not in distress.  HEENT: atraumatic, EOMI.  Chest:  decreased breath sounds B/l, R>L  CVS: SIS2 +, tachycardia+, systolic  murmur+  P/A: Soft, BS+  CNS: non focal.  Ext: no edema feet.  Skin: no rash, no ulcers.  All systems reviewed positive findings as above.                                            10.2<L>  20.70<H> )-----------( 191      ( 17 Feb 2021 08:16 )             32.5<L>    02-17    138  |  101  |  17  ----------------------------<  148<H>  3.3<L>   |  23  |  0.5<L>    Ca    8.1<L>      17 Feb 2021 08:16

## 2021-02-18 NOTE — PROGRESS NOTE ADULT - ASSESSMENT
80 year old male with hx of Stage 4 NSCLC in 2018 and has been on systemic therapy and follows with Dr Wasserman at [facility] with h/o of mets  to the brain s/p resection (2019) currently on chemo- Last dose on Feb 5th is here for for SOB and was found to have large Rt Pl effusion, lung mass and b/l subsegmental PE. He was also found to have new 0.4 cm lt temporal lesion on MRI on prior admission and is on decadron.       # Hypoxemic respiratory failure likely secondary to large right likely malignant pleural effusion in the setting of COPD complicated by subsegmental PE, rule out postobstructive pneumonia  # NSCLC stage 4 on systemic therapy with bone mets and new Lt temporal lesion   # New b/l subsegmental PE- not on AC due to recent GI bleed. s/p IVC filter on 2/17/21  # Possible Aspiration/post obstructive PNA    Discussed wth son over the phone on 2/16 with interpretor services.   On Abx as per ID  On NC at 6 liter O2      Recommendations:   Code Status:  DNR/DNI.   Son would like to c/w ongoing medical management   In the event of worsening clinical condition- will need to reach out to son to discuss about comfort measures.     Will Follow.   Call [phone] for any concerns.

## 2021-02-18 NOTE — DISCHARGE NOTE FOR THE EXPIRED PATIENT - HOSPITAL COURSE
Patient is 80 year old male with lung ca admitted with sob. found to have pleural effusion and PE. s/p thoracentesis and IVC filter placement. Patient DNR/DNI under the care of palliative care. Patient found pulseless, pronounced 2/18/21 at 1650 PM. Family notified.

## 2021-02-18 NOTE — PROGRESS NOTE ADULT - PROVIDER SPECIALTY LIST ADULT
Intervent Radiology
Hospitalist
Hospitalist
Palliative Care
Pulmonology
Pulmonology
Infectious Disease
Infectious Disease

## 2021-02-18 NOTE — PROGRESS NOTE ADULT - SUBJECTIVE AND OBJECTIVE BOX
Patient is a 80y old  Male who presents with a chief complaint of shortness of breath (17 Feb 2021 14:21)      Subjective: Patient bis lethargic today   Spoke to RN- he is more drowsy today and is able to eat with 1:1 assist  No distress. No c/o pain       Vital Signs Last 24 Hrs  T(C): 35.9 (18 Feb 2021 05:42), Max: 36.2 (17 Feb 2021 14:15)  T(F): 96.7 (18 Feb 2021 05:42), Max: 97.1 (17 Feb 2021 14:15)  HR: 110 (18 Feb 2021 05:42) (110 - 133)  BP: 100/66 (18 Feb 2021 05:42) (100/66 - 111/83)  BP(mean): 94 (17 Feb 2021 18:15) (94 - 94)  RR: 20 (18 Feb 2021 05:42) (20 - 22)  SpO2: 94% (18 Feb 2021 05:42) (93% - 96%)      EXAM:   Constitutional: Elderly and  ill appearing   General: On NC at 6 liters O2 . Drowsy today   EYES: closed   CV: normal S1/S2   Lungs: Decreased BS b/l more on Rt than Lt  Abdomen: soft non-tender  Ext: no edema  Neuro: Lethargic and drowsy today         MEDICATIONS  (STANDING):  albuterol/ipratropium for Nebulization 3 milliLiter(s) Nebulizer every 6 hours  budesonide 160 MICROgram(s)/formoterol 4.5 MICROgram(s) Inhaler 2 Puff(s) Inhalation two times a day  carvedilol 3.125 milliGRAM(s) Oral every 12 hours  dexAMETHasone     Tablet 1 milliGRAM(s) Oral two times a day  finasteride 5 milliGRAM(s) Oral daily  lacosamide 100 milliGRAM(s) Oral two times a day  levETIRAcetam  IVPB 750 milliGRAM(s) IV Intermittent every 12 hours  pantoprazole    Tablet 40 milliGRAM(s) Oral before breakfast  piperacillin/tazobactam IVPB.. 3.375 Gram(s) IV Intermittent every 6 hours  tamsulosin 0.4 milliGRAM(s) Oral at bedtime  tiotropium 18 MICROgram(s) Capsule 1 Capsule(s) Inhalation every 24 hours    MEDICATIONS  (PRN):  ALBUTerol    90 MICROgram(s) HFA Inhaler 2 Puff(s) Inhalation every 6 hours PRN Shortness of Breath and/or Wheezing      LABS:                          10.2   20.70 )-----------( 191      ( 17 Feb 2021 08:16 )             32.5         Mean Cell Volume : 95.0 fL  Mean Cell Hemoglobin : 29.8 pg  Mean Cell Hemoglobin Concentration : 31.4 g/dL  Auto Neutrophil # : 18.80 K/uL  Auto Lymphocyte # : 0.95 K/uL  Auto Monocyte # : 0.31 K/uL  Auto Eosinophil # : 0.03 K/uL  Auto Basophil # : 0.06 K/uL  Auto Neutrophil % : 90.8 %  Auto Lymphocyte % : 4.6 %  Auto Monocyte % : 1.5 %  Auto Eosinophil % : 0.1 %  Auto Basophil % : 0.3 %      Serial CBC's  02-17 @ 08:16  Hct-32.5 / Hgb-10.2 / Plat-191 / RBC-3.42 / WBC-20.70  Serial CBC's  02-16 @ 08:49  Hct-29.3 / Hgb-9.2 / Plat-183 / RBC-3.13 / WBC-17.38  Serial CBC's  02-15 @ 08:11  Hct-32.7 / Hgb-10.2 / Plat-188 / RBC-3.45 / WBC-18.27      02-17    138  |  101  |  17  ----------------------------<  148<H>  3.3<L>   |  23  |  0.5<L>    Ca    8.1<L>      17 Feb 2021 08:16                                    BLOOD SMEAR INTERPRETATION:       RADIOLOGY & ADDITIONAL STUDIES:

## 2021-02-19 LAB
CULTURE RESULTS: SIGNIFICANT CHANGE UP
SPECIMEN SOURCE: SIGNIFICANT CHANGE UP

## 2021-03-03 DIAGNOSIS — A41.9 SEPSIS, UNSPECIFIED ORGANISM: ICD-10-CM

## 2021-03-03 DIAGNOSIS — C79.51 SECONDARY MALIGNANT NEOPLASM OF BONE: ICD-10-CM

## 2021-03-03 DIAGNOSIS — I26.94 MULTIPLE SUBSEGMENTAL PULMONARY EMBOLI WITHOUT ACUTE COR PULMONALE: ICD-10-CM

## 2021-03-03 DIAGNOSIS — C79.31 SECONDARY MALIGNANT NEOPLASM OF BRAIN: ICD-10-CM

## 2021-03-03 DIAGNOSIS — I71.4 ABDOMINAL AORTIC ANEURYSM, WITHOUT RUPTURE: ICD-10-CM

## 2021-03-03 DIAGNOSIS — J96.01 ACUTE RESPIRATORY FAILURE WITH HYPOXIA: ICD-10-CM

## 2021-03-03 DIAGNOSIS — E87.6 HYPOKALEMIA: ICD-10-CM

## 2021-03-03 DIAGNOSIS — Z66 DO NOT RESUSCITATE: ICD-10-CM

## 2021-03-03 DIAGNOSIS — J44.0 CHRONIC OBSTRUCTIVE PULMONARY DISEASE WITH (ACUTE) LOWER RESPIRATORY INFECTION: ICD-10-CM

## 2021-03-03 DIAGNOSIS — J15.9 UNSPECIFIED BACTERIAL PNEUMONIA: ICD-10-CM

## 2021-03-03 DIAGNOSIS — C34.90 MALIGNANT NEOPLASM OF UNSPECIFIED PART OF UNSPECIFIED BRONCHUS OR LUNG: ICD-10-CM

## 2021-03-03 DIAGNOSIS — I82.451 ACUTE EMBOLISM AND THROMBOSIS OF RIGHT PERONEAL VEIN: ICD-10-CM

## 2021-03-03 DIAGNOSIS — N40.0 BENIGN PROSTATIC HYPERPLASIA WITHOUT LOWER URINARY TRACT SYMPTOMS: ICD-10-CM

## 2021-03-03 DIAGNOSIS — I10 ESSENTIAL (PRIMARY) HYPERTENSION: ICD-10-CM

## 2021-03-03 DIAGNOSIS — Z87.891 PERSONAL HISTORY OF NICOTINE DEPENDENCE: ICD-10-CM

## 2021-03-03 DIAGNOSIS — J91.0 MALIGNANT PLEURAL EFFUSION: ICD-10-CM

## 2021-03-03 DIAGNOSIS — J98.11 ATELECTASIS: ICD-10-CM

## 2021-03-17 LAB
CULTURE RESULTS: SIGNIFICANT CHANGE UP
SPECIMEN SOURCE: SIGNIFICANT CHANGE UP

## 2022-10-16 NOTE — ED ADULT TRIAGE NOTE - NSTRIAGECARE_GEN_A_ER
Problem: Discharge Planning  Goal: Discharge to home or other facility with appropriate resources  Outcome: Progressing     Problem: Pain  Goal: Verbalizes/displays adequate comfort level or baseline comfort level  Outcome: Progressing     Problem: Safety - Adult  Goal: Free from fall injury  Outcome: Progressing Face Mask

## 2023-01-20 NOTE — ED ADULT TRIAGE NOTE - HEIGHT IN CM
170.18 Render Note In Bullet Format When Appropriate: No Duration Of Freeze Thaw-Cycle (Seconds): 0 Detail Level: Detailed Consent: The patient's consent was obtained including but not limited to risks of crusting, scabbing, blistering, scarring, darker or lighter pigmentary change, recurrence, incomplete removal and infection. Show Aperture Variable?: Yes Post-Care Instructions: I reviewed with the patient in detail post-care instructions. Patient is to wear sunprotection, and avoid picking at any of the treated lesions. Pt may apply Vaseline to crusted or scabbing areas.

## 2024-02-20 NOTE — ED ADULT NURSE NOTE - CHIEF COMPLAINT
[Normal] : no jugular venous distention, supple, no lymphadenopathy and the thyroid was normal and there were no nodules present [de-identified] : redbump in nose [de-identified] : mild LLE swelling , pulses intact The patient is a 80y Male complaining of seizures.

## 2024-04-10 NOTE — OCCUPATIONAL THERAPY INITIAL EVALUATION ADULT - LEVEL OF INDEPENDENCE:TOILET, OT EVAL
BEFORE THE PROCEDURE:    REPORT ANY CHANGE IN YOUR PHYSICAL CONDITION TO YOUR DOCTOR IMMEDIATELY.  SELF ISOLATE AND CHECK TEMPERATURE DAILY, IF TEMP OVER 100, CALL PHYSICIAN IMMEDIATELY.    TRY TO REFRAIN FROM SMOKING AND ALCOHOL 72 HOURS BEFORE YOUR PROCEDURE.     THE DAY BEFORE YOUR PROCEDURE YOU WILL BE ON A  LIQUID DIET FROM WAKING IN THE MORNING UNTIL MIDNIGHT.    REFER TO YOUR PHYSICIANS INSTRUCTIONS ON LIQUID DIET AND COLON PREP.    NO MAKE UP, NAIL POLISH OR JEWELRY THE DAY OF PROCEDURE.      SOMEONE WILL CALL YOU THE DAY BEFORE YOUR PROCEDURE WITH A CHECK-IN TIME FOR YOUR PROCEDURE.    CHECK IN AT FIRST FLOOR REGISTRATION DESK.     DAY OF YOUR PROCEDURE:    TAKE BLOOD PRESSURE MEDICATIONS THE MORNING OF YOUR PROCEDURE, WITH SMALL SIPS WATER, AS DIRECTED BY YOUR PHYSICIAN.   DO NOT TAKE ANY DIABETIC MEDICATIONS UNLESS DIRECTED TO DO SO BY YOUR PHYSICIAN.   CONTACT LENSES AND DENTURES MUST BE REMOVED.  A RESPONSIBLE ADULT MUST ACCOMPANY YOU HOME UPON DISCHARGE.   ONLY 1 VISITOR ALLOWED PER ROOM.     YOUR THOUGHTS AND OPINIONS HELP US TO BETTER SERVE YOU.     PLEASE PARTICIPATE IN SURVEYS ABOUT YOUR CARE.    THANK YOU FOR CHOOSING [facility].    maximum assist (25% patients effort)
